# Patient Record
Sex: FEMALE | Race: WHITE | NOT HISPANIC OR LATINO | Employment: FULL TIME | URBAN - METROPOLITAN AREA
[De-identification: names, ages, dates, MRNs, and addresses within clinical notes are randomized per-mention and may not be internally consistent; named-entity substitution may affect disease eponyms.]

---

## 2020-02-05 ENCOUNTER — HOSPITAL ENCOUNTER (EMERGENCY)
Facility: HOSPITAL | Age: 30
Discharge: HOME | End: 2020-02-05
Attending: EMERGENCY MEDICINE | Admitting: EMERGENCY MEDICINE
Payer: COMMERCIAL

## 2020-02-05 VITALS
DIASTOLIC BLOOD PRESSURE: 68 MMHG | WEIGHT: 140 LBS | RESPIRATION RATE: 17 BRPM | SYSTOLIC BLOOD PRESSURE: 120 MMHG | HEART RATE: 86 BPM | OXYGEN SATURATION: 99 % | TEMPERATURE: 99 F

## 2020-02-05 DIAGNOSIS — R10.9 ABDOMINAL PAIN: Primary | ICD-10-CM

## 2020-02-05 DIAGNOSIS — R19.7 DIARRHEA: ICD-10-CM

## 2020-02-05 LAB
ALBUMIN SERPL BCP-MCNC: 3.7 G/DL (ref 3.5–5)
ALP SERPL-CCNC: 97 U/L (ref 46–116)
ALT SERPL W P-5'-P-CCNC: 20 U/L (ref 12–78)
ANION GAP SERPL CALCULATED.3IONS-SCNC: 9 MMOL/L (ref 4–13)
AST SERPL W P-5'-P-CCNC: 20 U/L (ref 5–45)
BASOPHILS # BLD AUTO: 0.02 THOUSANDS/ΜL (ref 0–0.1)
BASOPHILS NFR BLD AUTO: 1 % (ref 0–1)
BILIRUB SERPL-MCNC: 0.79 MG/DL (ref 0.2–1)
BUN SERPL-MCNC: 16 MG/DL (ref 5–25)
CALCIUM SERPL-MCNC: 8.8 MG/DL (ref 8.3–10.1)
CHLORIDE SERPL-SCNC: 107 MMOL/L (ref 100–108)
CO2 SERPL-SCNC: 25 MMOL/L (ref 21–32)
CREAT SERPL-MCNC: 0.83 MG/DL (ref 0.6–1.3)
EOSINOPHIL # BLD AUTO: 0.02 THOUSAND/ΜL (ref 0–0.61)
EOSINOPHIL NFR BLD AUTO: 1 % (ref 0–6)
ERYTHROCYTE [DISTWIDTH] IN BLOOD BY AUTOMATED COUNT: 13.3 % (ref 11.6–15.1)
GFR SERPL CREATININE-BSD FRML MDRD: 96 ML/MIN/1.73SQ M
GLUCOSE SERPL-MCNC: 88 MG/DL (ref 65–140)
HCT VFR BLD AUTO: 35 % (ref 34.8–46.1)
HGB BLD-MCNC: 11.6 G/DL (ref 11.5–15.4)
IMM GRANULOCYTES # BLD AUTO: 0.02 THOUSAND/UL (ref 0–0.2)
IMM GRANULOCYTES NFR BLD AUTO: 1 % (ref 0–2)
LIPASE SERPL-CCNC: 124 U/L (ref 73–393)
LYMPHOCYTES # BLD AUTO: 0.48 THOUSANDS/ΜL (ref 0.6–4.47)
LYMPHOCYTES NFR BLD AUTO: 12 % (ref 14–44)
MCH RBC QN AUTO: 28.8 PG (ref 26.8–34.3)
MCHC RBC AUTO-ENTMCNC: 33.1 G/DL (ref 31.4–37.4)
MCV RBC AUTO: 87 FL (ref 82–98)
MONOCYTES # BLD AUTO: 0.48 THOUSAND/ΜL (ref 0.17–1.22)
MONOCYTES NFR BLD AUTO: 12 % (ref 4–12)
NEUTROPHILS # BLD AUTO: 2.9 THOUSANDS/ΜL (ref 1.85–7.62)
NEUTS SEG NFR BLD AUTO: 73 % (ref 43–75)
NRBC BLD AUTO-RTO: 0 /100 WBCS
PLATELET # BLD AUTO: 171 THOUSANDS/UL (ref 149–390)
PMV BLD AUTO: 10.7 FL (ref 8.9–12.7)
POTASSIUM SERPL-SCNC: 4.3 MMOL/L (ref 3.5–5.3)
PROT SERPL-MCNC: 7.1 G/DL (ref 6.4–8.2)
RBC # BLD AUTO: 4.03 MILLION/UL (ref 3.81–5.12)
SODIUM SERPL-SCNC: 141 MMOL/L (ref 136–145)
WBC # BLD AUTO: 3.92 THOUSAND/UL (ref 4.31–10.16)

## 2020-02-05 PROCEDURE — 80053 COMPREHEN METABOLIC PANEL: CPT | Performed by: EMERGENCY MEDICINE

## 2020-02-05 PROCEDURE — 99284 EMERGENCY DEPT VISIT MOD MDM: CPT | Performed by: EMERGENCY MEDICINE

## 2020-02-05 PROCEDURE — 96374 THER/PROPH/DIAG INJ IV PUSH: CPT

## 2020-02-05 PROCEDURE — 36415 COLL VENOUS BLD VENIPUNCTURE: CPT | Performed by: EMERGENCY MEDICINE

## 2020-02-05 PROCEDURE — 99284 EMERGENCY DEPT VISIT MOD MDM: CPT

## 2020-02-05 PROCEDURE — 85025 COMPLETE CBC W/AUTO DIFF WBC: CPT | Performed by: EMERGENCY MEDICINE

## 2020-02-05 PROCEDURE — 83690 ASSAY OF LIPASE: CPT | Performed by: EMERGENCY MEDICINE

## 2020-02-05 PROCEDURE — 96361 HYDRATE IV INFUSION ADD-ON: CPT

## 2020-02-05 RX ORDER — ONDANSETRON 4 MG/1
4 TABLET, ORALLY DISINTEGRATING ORAL EVERY 8 HOURS PRN
Qty: 10 TABLET | Refills: 0 | Status: SHIPPED | OUTPATIENT
Start: 2020-02-05 | End: 2020-03-06 | Stop reason: ALTCHOICE

## 2020-02-05 RX ORDER — ONDANSETRON 2 MG/ML
4 INJECTION INTRAMUSCULAR; INTRAVENOUS ONCE
Status: COMPLETED | OUTPATIENT
Start: 2020-02-05 | End: 2020-02-05

## 2020-02-05 RX ADMIN — SODIUM CHLORIDE 1000 ML: 0.9 INJECTION, SOLUTION INTRAVENOUS at 11:50

## 2020-02-05 RX ADMIN — ONDANSETRON 4 MG: 2 INJECTION INTRAMUSCULAR; INTRAVENOUS at 11:50

## 2020-02-05 NOTE — ED NOTES
Pt ambulated to restroom by self with negative complications   Clean catch UA sample collected     Sparkle Lane RN  02/05/20 5950

## 2020-02-05 NOTE — ED PROVIDER NOTES
History  Chief Complaint   Patient presents with    Abdominal Pain     pt reports body aches, diarrhea and fevers; started  yesterday      34 y o  Female presents with chief complaint of right lower quadrant abdominal pain and crampy pain  Patient has had some diarrhea and nausea  No fevers  No unusual foods, recent travel or sick contacts  Patient's past surgical history is significant for  x 2  No urinary complaints  History provided by:  Patient   used: No    Abdominal Pain   Pain location:  RLQ  Pain quality: aching and sharp    Pain severity:  Mild  Onset quality:  Sudden  Duration:  1 day  Timing:  Constant  Progression:  Unchanged  Chronicity:  New  Context: not diet changes, not recent illness, not sick contacts, not suspicious food intake and not trauma    Relieved by:  Nothing  Worsened by:  Nothing  Ineffective treatments:  None tried  Associated symptoms: chills, diarrhea and nausea    Associated symptoms: no chest pain, no dysuria, no fever, no melena, no shortness of breath and no vomiting        None       History reviewed  No pertinent past medical history  Past Surgical History:   Procedure Laterality Date     SECTION         History reviewed  No pertinent family history  I have reviewed and agree with the history as documented  Social History     Tobacco Use    Smoking status: Never Smoker    Smokeless tobacco: Never Used   Substance Use Topics    Alcohol use: Never     Frequency: Never    Drug use: Never        Review of Systems   Constitutional: Positive for chills  Negative for diaphoresis and fever  Respiratory: Negative for shortness of breath  Cardiovascular: Negative for chest pain and palpitations  Gastrointestinal: Positive for abdominal pain, diarrhea and nausea  Negative for melena and vomiting  Genitourinary: Negative for dysuria and frequency  Skin: Negative for rash     All other systems reviewed and are negative  Physical Exam  Physical Exam   Constitutional: She is oriented to person, place, and time  She appears well-developed and well-nourished  She appears distressed  HENT:   Head: Normocephalic and atraumatic  Eyes: Pupils are equal, round, and reactive to light  EOM are normal    Neck: Normal range of motion  No JVD present  Cardiovascular: Normal rate, regular rhythm, normal heart sounds and intact distal pulses  Exam reveals no gallop and no friction rub  No murmur heard  Pulmonary/Chest: Effort normal and breath sounds normal  No respiratory distress  She has no wheezes  She has no rales  She exhibits no tenderness  Abdominal: Normal appearance and bowel sounds are normal  There is tenderness (mild) in the right lower quadrant and suprapubic area  There is no rigidity, no rebound, no guarding, no tenderness at McBurney's point and negative Huggins's sign  Musculoskeletal: Normal range of motion  She exhibits no tenderness  Neurological: She is alert and oriented to person, place, and time  Skin: Skin is warm and dry  Psychiatric: She has a normal mood and affect  Her behavior is normal  Judgment and thought content normal    Nursing note and vitals reviewed        Vital Signs  ED Triage Vitals   Temperature Pulse Respirations Blood Pressure SpO2   02/05/20 1103 02/05/20 1104 02/05/20 1104 02/05/20 1104 02/05/20 1104   99 °F (37 2 °C) 97 18 114/72 100 %      Temp Source Heart Rate Source Patient Position - Orthostatic VS BP Location FiO2 (%)   02/05/20 1103 02/05/20 1104 02/05/20 1104 02/05/20 1104 --   Oral Monitor Sitting Left arm       Pain Score       --                  Vitals:    02/05/20 1104   BP: 114/72   Pulse: 97   Patient Position - Orthostatic VS: Sitting         Visual Acuity      ED Medications  Medications   sodium chloride 0 9 % bolus 1,000 mL (1,000 mL Intravenous New Bag 2/5/20 1150)   ondansetron (ZOFRAN) injection 4 mg (4 mg Intravenous Given 2/5/20 1150) Diagnostic Studies  Results Reviewed     Procedure Component Value Units Date/Time    Comprehensive metabolic panel [[id number]] Collected:  02/05/20 1145    Lab Status:  Final result Specimen:  Blood from Arm, Right Updated:  02/05/20 1217     Sodium 141 mmol/L      Potassium 4 3 mmol/L      Chloride 107 mmol/L      CO2 25 mmol/L      ANION GAP 9 mmol/L      BUN 16 mg/dL      Creatinine 0 83 mg/dL      Glucose 88 mg/dL      Calcium 8 8 mg/dL      AST 20 U/L      ALT 20 U/L      Alkaline Phosphatase 97 U/L      Total Protein 7 1 g/dL      Albumin 3 7 g/dL      Total Bilirubin 0 79 mg/dL      eGFR 96 ml/min/1 73sq m     Narrative:       [employer] guidelines for Chronic Kidney Disease (CKD):     Stage 1 with normal or high GFR (GFR > 90 mL/min/1 73 square meters)    Stage 2 Mild CKD (GFR = 60-89 mL/min/1 73 square meters)    Stage 3A Moderate CKD (GFR = 45-59 mL/min/1 73 square meters)    Stage 3B Moderate CKD (GFR = 30-44 mL/min/1 73 square meters)    Stage 4 Severe CKD (GFR = 15-29 mL/min/1 73 square meters)    Stage 5 End Stage CKD (GFR <15 mL/min/1 73 square meters)  Note: GFR calculation is accurate only with a steady state creatinine    Lipase [[id number]]  (Normal) Collected:  02/05/20 1145    Lab Status:  Final result Specimen:  Blood from Arm, Right Updated:  02/05/20 1217     Lipase 124 u/L     CBC and differential [[id number]]  (Abnormal) Collected:  02/05/20 1145    Lab Status:  Final result Specimen:  Blood from Arm, Right Updated:  02/05/20 1201     WBC 3 92 Thousand/uL      RBC 4 03 Million/uL      Hemoglobin 11 6 g/dL      Hematocrit 35 0 %      MCV 87 fL      MCH 28 8 pg      MCHC 33 1 g/dL      RDW 13 3 %      MPV 10 7 fL      Platelets 270 Thousands/uL      nRBC 0 /100 WBCs      Neutrophils Relative 73 %      Immat GRANS % 1 %      Lymphocytes Relative 12 %      Monocytes Relative 12 %      Eosinophils Relative 1 %      Basophils Relative 1 %      Neutrophils Absolute 2 90 Thousands/µL      Immature Grans Absolute 0 02 Thousand/uL      Lymphocytes Absolute 0 48 Thousands/µL      Monocytes Absolute 0 48 Thousand/µL      Eosinophils Absolute 0 02 Thousand/µL      Basophils Absolute 0 02 Thousands/µL                  No orders to display              Procedures  Procedures         ED Course                               MDM  Number of Diagnoses or Management Options  Abdominal pain: new and requires workup  Diarrhea: new and requires workup  Diagnosis management comments: Background: 34 y o  female presents with nausea, diarrhea, mild abdominal pain and chills    Differential DX includes but is not limited to: gastroenteritis, colitis, enteritis, mesenteric adenitis, doubt appendicitis or ovarian pathology    Plan: cbc, metabolic panel, symptomatic treatment          Amount and/or Complexity of Data Reviewed  Clinical lab tests: ordered and reviewed    Risk of Complications, Morbidity, and/or Mortality  Presenting problems: high  Diagnostic procedures: high  Management options: high    Patient Progress  Patient progress: stable        Disposition  Final diagnoses:   Abdominal pain   Diarrhea     Time reflects when diagnosis was documented in both MDM as applicable and the Disposition within this note     Time User Action Codes Description Comment    2/5/2020 12:54 PM Marcha Gitelman B Add [R10 9] Abdominal pain     2/5/2020 12:54 PM Luis Suarez Add [R19 7] Diarrhea       ED Disposition     ED Disposition Condition Date/Time Comment    Discharge Stable Wed Feb 5, 2020 12:54 PM Angelina Joaquin discharge to home/self care              Follow-up Information    None         Patient's Medications   Discharge Prescriptions    ONDANSETRON (ZOFRAN-ODT) 4 MG DISINTEGRATING TABLET    Take 1 tablet (4 mg total) by mouth every 8 (eight) hours as needed for nausea or vomiting       Start Date: 2/5/2020  End Date: 3/6/2020       Order Dose: 4 mg       Quantity: 10 tablet    Refills: 0 No discharge procedures on file      ED Provider  Electronically Signed by           Pranav Galvez MD  02/05/20 [phone]

## 2020-12-04 ENCOUNTER — HOSPITAL ENCOUNTER (EMERGENCY)
Facility: HOSPITAL | Age: 30
Discharge: HOME | End: 2020-12-04
Payer: COMMERCIAL

## 2020-12-04 VITALS
OXYGEN SATURATION: 100 % | DIASTOLIC BLOOD PRESSURE: 55 MMHG | HEIGHT: 66 IN | BODY MASS INDEX: 24.36 KG/M2 | WEIGHT: 151.6 LBS | SYSTOLIC BLOOD PRESSURE: 121 MMHG | HEART RATE: 83 BPM | TEMPERATURE: 98.2 F | RESPIRATION RATE: 16 BRPM

## 2020-12-04 DIAGNOSIS — Z20.822 EXPOSURE TO COVID-19 VIRUS: Primary | ICD-10-CM

## 2020-12-04 PROCEDURE — 99283 EMERGENCY DEPT VISIT LOW MDM: CPT

## 2020-12-04 PROCEDURE — U0003 INFECTIOUS AGENT DETECTION BY NUCLEIC ACID (DNA OR RNA); SEVERE ACUTE RESPIRATORY SYNDROME CORONAVIRUS 2 (SARS-COV-2) (CORONAVIRUS DISEASE [COVID-19]), AMPLIFIED PROBE TECHNIQUE, MAKING USE OF HIGH THROUGHPUT TECHNOLOGIES AS DESCRIBED BY CMS-2020-01-R: HCPCS | Performed by: PHYSICIAN ASSISTANT

## 2020-12-04 PROCEDURE — 99282 EMERGENCY DEPT VISIT SF MDM: CPT | Performed by: PHYSICIAN ASSISTANT

## 2020-12-06 LAB — SARS-COV-2 RNA SPEC QL NAA+PROBE: NOT DETECTED

## 2021-03-10 ENCOUNTER — APPOINTMENT (EMERGENCY)
Dept: RADIOLOGY | Facility: HOSPITAL | Age: 31
End: 2021-03-10
Payer: COMMERCIAL

## 2021-03-10 ENCOUNTER — HOSPITAL ENCOUNTER (EMERGENCY)
Facility: HOSPITAL | Age: 31
Discharge: HOME | End: 2021-03-10
Attending: EMERGENCY MEDICINE | Admitting: EMERGENCY MEDICINE
Payer: COMMERCIAL

## 2021-03-10 VITALS
OXYGEN SATURATION: 97 % | HEART RATE: 85 BPM | DIASTOLIC BLOOD PRESSURE: 73 MMHG | TEMPERATURE: 98.4 F | SYSTOLIC BLOOD PRESSURE: 110 MMHG | RESPIRATION RATE: 16 BRPM | BODY MASS INDEX: 20.89 KG/M2 | WEIGHT: 130 LBS | HEIGHT: 66 IN

## 2021-03-10 DIAGNOSIS — R07.89 CHEST TIGHTNESS: Primary | ICD-10-CM

## 2021-03-10 DIAGNOSIS — R10.13 EPIGASTRIC DISCOMFORT: ICD-10-CM

## 2021-03-10 DIAGNOSIS — R19.7 DIARRHEA: ICD-10-CM

## 2021-03-10 LAB
ALBUMIN SERPL BCP-MCNC: 3.7 G/DL (ref 3.5–5)
ALP SERPL-CCNC: 88 U/L (ref 46–116)
ALT SERPL W P-5'-P-CCNC: 17 U/L (ref 12–78)
ANION GAP SERPL CALCULATED.3IONS-SCNC: 5 MMOL/L (ref 4–13)
AST SERPL W P-5'-P-CCNC: 16 U/L (ref 5–45)
ATRIAL RATE: 66 BPM
BASOPHILS # BLD AUTO: 0.06 THOUSANDS/ΜL (ref 0–0.1)
BASOPHILS NFR BLD AUTO: 1 % (ref 0–1)
BILIRUB SERPL-MCNC: 0.5 MG/DL (ref 0.2–1)
BILIRUB UR QL STRIP: NEGATIVE
BUN SERPL-MCNC: 12 MG/DL (ref 5–25)
CALCIUM SERPL-MCNC: 8.7 MG/DL (ref 8.3–10.1)
CHLORIDE SERPL-SCNC: 105 MMOL/L (ref 100–108)
CLARITY UR: CLEAR
CO2 SERPL-SCNC: 29 MMOL/L (ref 21–32)
COLOR UR: YELLOW
CREAT SERPL-MCNC: 0.86 MG/DL (ref 0.6–1.3)
EOSINOPHIL # BLD AUTO: 0.18 THOUSAND/ΜL (ref 0–0.61)
EOSINOPHIL NFR BLD AUTO: 3 % (ref 0–6)
ERYTHROCYTE [DISTWIDTH] IN BLOOD BY AUTOMATED COUNT: 13.7 % (ref 11.6–15.1)
EXT PREG TEST URINE: NEGATIVE
EXT. CONTROL ED NAV: NORMAL
FLUAV RNA RESP QL NAA+PROBE: NEGATIVE
FLUBV RNA RESP QL NAA+PROBE: NEGATIVE
GFR SERPL CREATININE-BSD FRML MDRD: 91 ML/MIN/1.73SQ M
GLUCOSE SERPL-MCNC: 91 MG/DL (ref 65–140)
GLUCOSE UR STRIP-MCNC: NEGATIVE MG/DL
HCT VFR BLD AUTO: 38.9 % (ref 34.8–46.1)
HGB BLD-MCNC: 12.2 G/DL (ref 11.5–15.4)
HGB UR QL STRIP.AUTO: NEGATIVE
IMM GRANULOCYTES # BLD AUTO: 0.01 THOUSAND/UL (ref 0–0.2)
IMM GRANULOCYTES NFR BLD AUTO: 0 % (ref 0–2)
KETONES UR STRIP-MCNC: NEGATIVE MG/DL
LEUKOCYTE ESTERASE UR QL STRIP: NEGATIVE
LYMPHOCYTES # BLD AUTO: 1.59 THOUSANDS/ΜL (ref 0.6–4.47)
LYMPHOCYTES NFR BLD AUTO: 30 % (ref 14–44)
MCH RBC QN AUTO: 27 PG (ref 26.8–34.3)
MCHC RBC AUTO-ENTMCNC: 31.4 G/DL (ref 31.4–37.4)
MCV RBC AUTO: 86 FL (ref 82–98)
MONOCYTES # BLD AUTO: 0.41 THOUSAND/ΜL (ref 0.17–1.22)
MONOCYTES NFR BLD AUTO: 8 % (ref 4–12)
NEUTROPHILS # BLD AUTO: 3.14 THOUSANDS/ΜL (ref 1.85–7.62)
NEUTS SEG NFR BLD AUTO: 58 % (ref 43–75)
NITRITE UR QL STRIP: NEGATIVE
NRBC BLD AUTO-RTO: 0 /100 WBCS
P AXIS: 19 DEGREES
PH UR STRIP.AUTO: 7 [PH] (ref 4.5–8)
PLATELET # BLD AUTO: 253 THOUSANDS/UL (ref 149–390)
PMV BLD AUTO: 10.6 FL (ref 8.9–12.7)
POTASSIUM SERPL-SCNC: 4.1 MMOL/L (ref 3.5–5.3)
PR INTERVAL: 158 MS
PROT SERPL-MCNC: 7.2 G/DL (ref 6.4–8.2)
PROT UR STRIP-MCNC: NEGATIVE MG/DL
QRS AXIS: 84 DEGREES
QRSD INTERVAL: 74 MS
QT INTERVAL: 380 MS
QTC INTERVAL: 392 MS
RBC # BLD AUTO: 4.52 MILLION/UL (ref 3.81–5.12)
RSV RNA RESP QL NAA+PROBE: NEGATIVE
SARS-COV-2 RNA RESP QL NAA+PROBE: NEGATIVE
SODIUM SERPL-SCNC: 139 MMOL/L (ref 136–145)
SP GR UR STRIP.AUTO: 1.01 (ref 1–1.03)
T WAVE AXIS: 52 DEGREES
TROPONIN I SERPL-MCNC: <0.02 NG/ML
UROBILINOGEN UR QL STRIP.AUTO: 0.2 E.U./DL
VENTRICULAR RATE: 64 BPM
WBC # BLD AUTO: 5.39 THOUSAND/UL (ref 4.31–10.16)

## 2021-03-10 PROCEDURE — 93010 ELECTROCARDIOGRAM REPORT: CPT | Performed by: INTERNAL MEDICINE

## 2021-03-10 PROCEDURE — 96374 THER/PROPH/DIAG INJ IV PUSH: CPT

## 2021-03-10 PROCEDURE — 0241U HB NFCT DS VIR RESP RNA 4 TRGT: CPT | Performed by: PHYSICIAN ASSISTANT

## 2021-03-10 PROCEDURE — 99285 EMERGENCY DEPT VISIT HI MDM: CPT | Performed by: PHYSICIAN ASSISTANT

## 2021-03-10 PROCEDURE — 96375 TX/PRO/DX INJ NEW DRUG ADDON: CPT

## 2021-03-10 PROCEDURE — 99285 EMERGENCY DEPT VISIT HI MDM: CPT

## 2021-03-10 PROCEDURE — 85025 COMPLETE CBC W/AUTO DIFF WBC: CPT | Performed by: PHYSICIAN ASSISTANT

## 2021-03-10 PROCEDURE — 80053 COMPREHEN METABOLIC PANEL: CPT | Performed by: PHYSICIAN ASSISTANT

## 2021-03-10 PROCEDURE — 36415 COLL VENOUS BLD VENIPUNCTURE: CPT | Performed by: PHYSICIAN ASSISTANT

## 2021-03-10 PROCEDURE — 71045 X-RAY EXAM CHEST 1 VIEW: CPT

## 2021-03-10 PROCEDURE — 93005 ELECTROCARDIOGRAM TRACING: CPT

## 2021-03-10 PROCEDURE — 84484 ASSAY OF TROPONIN QUANT: CPT | Performed by: PHYSICIAN ASSISTANT

## 2021-03-10 PROCEDURE — 81003 URINALYSIS AUTO W/O SCOPE: CPT

## 2021-03-10 PROCEDURE — 96361 HYDRATE IV INFUSION ADD-ON: CPT

## 2021-03-10 PROCEDURE — 81025 URINE PREGNANCY TEST: CPT | Performed by: PHYSICIAN ASSISTANT

## 2021-03-10 RX ORDER — ONDANSETRON 2 MG/ML
4 INJECTION INTRAMUSCULAR; INTRAVENOUS ONCE
Status: COMPLETED | OUTPATIENT
Start: 2021-03-10 | End: 2021-03-10

## 2021-03-10 RX ADMIN — FAMOTIDINE 20 MG: 10 INJECTION, SOLUTION INTRAVENOUS at 09:32

## 2021-03-10 RX ADMIN — SODIUM CHLORIDE 1000 ML: 0.9 INJECTION, SOLUTION INTRAVENOUS at 09:22

## 2021-03-10 RX ADMIN — ONDANSETRON 4 MG: 2 INJECTION INTRAMUSCULAR; INTRAVENOUS at 09:31

## 2021-03-10 NOTE — ED PROVIDER NOTES
History  Chief Complaint   Patient presents with    Chest Pain     3-4 day hx intermittant chest pain with SOB     Angelina A Cece Argueta is a 27 y o  female who presents to the ED with complaints of intermittent chest tightness and sensation of taking deep breath over the past few days  Patient reports that she is also having weakness" in her epigastric area of her stomach with associated nausea and diarrhea  Patient admits to a dry cough  Patient reports that her boyfriend recently tested positive for COVID-19 but she did have a recent test which was negative  Patient states prior to the onset of symptoms she did eat a pork chop which was coconut oil that was left out for a few days " Denies vomiting, blood in stool, sore throat, nasal congestion, leg pain, leg swelling, dyspnea on exertion, wheezing, hemoptysis, sore throat, abdominal pain, dysuria, urinary frequency, urinary urgency, hematuria, fever, chills  Patient denies previous DVT or PE  Patient denies chance of pregnancy  Patient does not take any exogenous estrogen  Patient did have a reconstructive buttocks surgery in December but no recent surgeries last 4 weeks  Patient does admit to smoking marijuana  History provided by:  Patient  Chest Pain  Pain location:  Epigastric  Pain quality: tightness    Duration:  4 days  Timing:  Intermittent  Progression:  Waxing and waning  Associated symptoms: cough and nausea    Associated symptoms: no abdominal pain, no anorexia, no anxiety, no diaphoresis, no dizziness, no dysphagia, no fatigue, no fever, no headache, no lower extremity edema, no orthopnea, no palpitations, no PND, no shortness of breath and not vomiting    Risk factors: no birth control, no diabetes mellitus, no high cholesterol, no hypertension, not pregnant and no prior DVT/PE        Prior to Admission Medications   Prescriptions Last Dose Informant Patient Reported?  Taking?   ondansetron (ZOFRAN-ODT) 4 mg disintegrating tablet   No No Sig: Take 1 tablet (4 mg total) by mouth every 8 (eight) hours as needed for nausea or vomiting      Facility-Administered Medications: None       History reviewed  No pertinent past medical history  Past Surgical History:   Procedure Laterality Date    ABDOMINAL SURGERY       SECTION      COMBINED ABDOMINOPLASTY AND LIPOSUCTION      COSMETIC SURGERY      Pt states " Olinda"       Family History   Problem Relation Age of Onset    No Known Problems Mother     No Known Problems Father      I have reviewed and agree with the history as documented  E-Cigarette/Vaping     E-Cigarette/Vaping Substances     Social History     Tobacco Use    Smoking status: Never Smoker    Smokeless tobacco: Never Used    Tobacco comment: Former smoker - As per [place]    Substance Use Topics    Alcohol use: Never     Frequency: Never    Drug use: Never       Review of Systems   Constitutional: Positive for appetite change  Negative for chills, diaphoresis, fatigue, fever and unexpected weight change  HENT: Negative for congestion, drooling, ear pain, rhinorrhea, sore throat, trouble swallowing and voice change  Eyes: Negative for pain, discharge, redness and visual disturbance  Respiratory: Positive for cough  Negative for shortness of breath, wheezing and stridor  Cardiovascular: Positive for chest pain  Negative for palpitations, orthopnea, leg swelling and PND  Gastrointestinal: Positive for diarrhea and nausea  Negative for abdominal pain, anorexia, blood in stool, constipation and vomiting  Genitourinary: Negative for dysuria, flank pain, frequency, hematuria and urgency  Musculoskeletal: Negative for gait problem, joint swelling, neck pain and neck stiffness  Skin: Negative for color change and rash  Neurological: Negative for dizziness, seizures, light-headedness and headaches  Physical Exam  Physical Exam  Vitals signs and nursing note reviewed     Constitutional: General: She is not in acute distress  Appearance: She is well-developed  She is not ill-appearing  HENT:      Head: Normocephalic and atraumatic  Nose: Nose normal    Eyes:      Conjunctiva/sclera: Conjunctivae normal       Pupils: Pupils are equal, round, and reactive to light  Cardiovascular:      Rate and Rhythm: Normal rate and regular rhythm  Pulmonary:      Effort: Pulmonary effort is normal       Breath sounds: Normal breath sounds  No decreased breath sounds or wheezing  Abdominal:      General: Abdomen is flat  Bowel sounds are normal       Palpations: Abdomen is soft  Tenderness: There is no abdominal tenderness  There is no right CVA tenderness, left CVA tenderness, guarding or rebound  Musculoskeletal: Normal range of motion  Right lower leg: She exhibits no tenderness  No edema  Left lower leg: She exhibits no tenderness  No edema  Skin:     General: Skin is warm and dry  Capillary Refill: Capillary refill takes less than 2 seconds  Neurological:      Mental Status: She is alert and oriented to person, place, and time           Vital Signs  ED Triage Vitals [03/10/21 0850]   Temperature Pulse Respirations Blood Pressure SpO2   98 4 °F (36 9 °C) 85 16 110/73 97 %      Temp Source Heart Rate Source Patient Position - Orthostatic VS BP Location FiO2 (%)   Oral Monitor Lying Right arm --      Pain Score       No Pain           Vitals:    03/10/21 0850   BP: 110/73   Pulse: 85   Patient Position - Orthostatic VS: Lying         Visual Acuity      ED Medications  Medications   ondansetron (ZOFRAN) injection 4 mg (4 mg Intravenous Given 3/10/21 0931)   famotidine (PEPCID) injection 20 mg (20 mg Intravenous Given 3/10/21 0932)   sodium chloride 0 9 % bolus 1,000 mL (0 mL Intravenous Stopped 3/10/21 1105)       Diagnostic Studies  Results Reviewed     Procedure Component Value Units Date/Time    POCT pregnancy, urine [[id number]]  (Normal) Resulted: 03/10/21 1056    Lab Status: Final result Updated: 03/10/21 1057     EXT PREG TEST UR (Ref: Negative) negative     Control valid    Urine Macroscopic, POC [[id number]] Collected: 03/10/21 1053    Lab Status: Final result Specimen: Urine Updated: 03/10/21 1054     Color, UA Yellow     Clarity, UA Clear     pH, UA 7 0     Leukocytes, UA Negative     Nitrite, UA Negative     Protein, UA Negative mg/dl      Glucose, UA Negative mg/dl      Ketones, UA Negative mg/dl      Urobilinogen, UA 0 2 E U /dl      Bilirubin, UA Negative     Blood, UA Negative     Specific Gravity, UA 1 015    Narrative:      [facility] RESULT    COVID19, Influenza A/B, RSV PCR, [facility] [[id number]]  (Normal) Collected: 03/10/21 0922    Lab Status: Final result Specimen: Nares from Nasopharyngeal Swab Updated: 03/10/21 1034     SARS-CoV-2 Negative     INFLUENZA A PCR Negative     INFLUENZA B PCR Negative     RSV PCR Negative    Narrative: This test has been authorized by FDA under an EUA (Emergency Use Assay) for use by authorized laboratories  Clinical caution and judgement should be used with the interpretation of these results with consideration of the clinical impression and other laboratory testing  Testing reported as "Positive" or "Negative" has been proven to be accurate according to standard laboratory validation requirements  All testing is performed with control materials showing appropriate reactivity at standard intervals      Troponin I [[id number]]  (Normal) Collected: 03/10/21 0922    Lab Status: Final result Specimen: Blood from Arm, Right Updated: 03/10/21 0947     Troponin I <0 02 ng/mL     Comprehensive metabolic panel [[id number]] Collected: 03/10/21 0923    Lab Status: Final result Specimen: Blood from Arm, Right Updated: 03/10/21 0945     Sodium 139 mmol/L      Potassium 4 1 mmol/L      Chloride 105 mmol/L      CO2 29 mmol/L      ANION GAP 5 mmol/L      BUN 12 mg/dL      Creatinine 0 86 mg/dL      Glucose 91 mg/dL      Calcium 8 7 mg/dL      AST 16 U/L      ALT 17 U/L      Alkaline Phosphatase 88 U/L      Total Protein 7 2 g/dL      Albumin 3 7 g/dL      Total Bilirubin 0 50 mg/dL      eGFR 91 ml/min/1 73sq m     Narrative:      [place] guidelines for Chronic Kidney Disease (CKD):     Stage 1 with normal or high GFR (GFR > 90 mL/min/1 73 square meters)    Stage 2 Mild CKD (GFR = 60-89 mL/min/1 73 square meters)    Stage 3A Moderate CKD (GFR = 45-59 mL/min/1 73 square meters)    Stage 3B Moderate CKD (GFR = 30-44 mL/min/1 73 square meters)    Stage 4 Severe CKD (GFR = 15-29 mL/min/1 73 square meters)    Stage 5 End Stage CKD (GFR <15 mL/min/1 73 square meters)  Note: GFR calculation is accurate only with a steady state creatinine    CBC and differential [[id number]] Collected: 03/10/21 0922    Lab Status: Final result Specimen: Blood from Arm, Right Updated: 03/10/21 0931     WBC 5 39 Thousand/uL      RBC 4 52 Million/uL      Hemoglobin 12 2 g/dL      Hematocrit 38 9 %      MCV 86 fL      MCH 27 0 pg      MCHC 31 4 g/dL      RDW 13 7 %      MPV 10 6 fL      Platelets 663 Thousands/uL      nRBC 0 /100 WBCs      Neutrophils Relative 58 %      Immat GRANS % 0 %      Lymphocytes Relative 30 %      Monocytes Relative 8 %      Eosinophils Relative 3 %      Basophils Relative 1 %      Neutrophils Absolute 3 14 Thousands/µL      Immature Grans Absolute 0 01 Thousand/uL      Lymphocytes Absolute 1 59 Thousands/µL      Monocytes Absolute 0 41 Thousand/µL      Eosinophils Absolute 0 18 Thousand/µL      Basophils Absolute 0 06 Thousands/µL                  XR chest 1 view   ED Interpretation by Latonya Conteh PA-C (03/10 1009)   No acute cardiopulmonary disease      Final Result by Jesu Scherer MD (03/10 1133)      No acute cardiopulmonary disease                    Workstation performed: [id number]                    Procedures  ECG 12 Lead Documentation Only    Date/Time: 3/10/2021 10:30 AM  Performed by: Latonya Conteh SUMA  Authorized by: Christiane Truong MD     Indications / Diagnosis:  Chest Tightness  ECG reviewed by me, the ED Provider: yes    Patient location:  ED  Previous ECG:     Previous ECG:  Unavailable  Rate:     ECG rate:  64  Rhythm:     Rhythm: sinus rhythm    QRS:     QRS axis:  Normal  ST segments:     ST segments:  Normal  T waves:     T waves: inverted      Inverted:  AVL  Comments:      No acute ST or T wave changes  QT/QTc 380/392             ED Course  ED Course as of Mar 10 1203   Wed Mar 10, 2021   1141 Patient is feeling improved  5 Educated patient regarding diagnosis and management  Advised patient to follow up with PCP  Advised patient to RTER for persistent or worsening symptoms  HEART Risk Score      Most Recent Value   Heart Score Risk Calculator   History  0 Filed at: 03/10/2021 1010   ECG  0 Filed at: 03/10/2021 1010   Age  0 Filed at: 03/10/2021 1010   Risk Factors  0 Filed at: 03/10/2021 1010   Troponin  0 Filed at: 03/10/2021 1010   HEART Score  0 Filed at: 03/10/2021 1010              PERC Rule for PE      Most Recent Value   PERC Rule for PE   Age >=50  0 Filed at: 03/10/2021 1010   HR >=100  0 Filed at: 03/10/2021 1010   O2 Sat on room air < 95%  0 Filed at: 03/10/2021 1010   History of PE or DVT  0 Filed at: 03/10/2021 1010   Recent trauma or surgery  0 Filed at: 03/10/2021 1010   Hemoptysis  0 Filed at: 03/10/2021 1010   Exogenous estrogen  0 Filed at: 03/10/2021 1010   Unilateral leg swelling  0 Filed at: 03/10/2021 1010   PERC Rule for PE Results  0 Filed at: 03/10/2021 1010                            MDM  Number of Diagnoses or Management Options  Chest tightness: new and requires workup  Diarrhea: new and requires workup  Epigastric discomfort: new and requires workup  Diagnosis management comments: EKG unremarkable  Urine pregnancy negative  Labs unremarkable  Patient is feeling better after Pepcid and Zofran  I provided patient with strict RTER precautions  I advised patient follow-up with PCP in 24-48 hours  Patient verbalized understanding  Amount and/or Complexity of Data Reviewed  Clinical lab tests: ordered and reviewed  Tests in the radiology section of CPT®: reviewed and ordered  Review and summarize past medical records: yes    Patient Progress  Patient progress: stable      Disposition  Final diagnoses:   Chest tightness   Epigastric discomfort   Diarrhea     Time reflects when diagnosis was documented in both MDM as applicable and the Disposition within this note     Time User Action Codes Description Comment    3/10/2021 11:04 AM Ora Main Add [R07 89] Chest tightness     3/10/2021 11:04 AM Ora Main Add [R10 13] Epigastric discomfort     3/10/2021 11:04 AM Ora Main Add [R19 7] Diarrhea       ED Disposition     ED Disposition Condition Date/Time Comment    Discharge Stable Wed Mar 10, 2021 11:41 AM Angelina Dupont discharge to home/self care  Follow-up Information     Follow up With Specialties Details Why Contact Info Additional Information    [facility] Emergency Medicine Go to  If symptoms worsen [address] Emergency Department, [place], [place], [address]    Seferino Marcus MD Family Medicine Schedule an appointment as soon as possible for a visit   [address]  [id number] [phone]             Patient's Medications   Discharge Prescriptions    No medications on file     No discharge procedures on file      PDMP Review     None          ED Provider  Electronically Signed by           Michael Tijerina PA-C  03/10/21 [id number]

## 2021-03-10 NOTE — Clinical Note
Jamey Yari was seen and treated in our emergency department on 3/10/2021  Diagnosis:     Angelina  may return to work on return date  She may return on this date: 03/13/2021         If you have any questions or concerns, please don't hesitate to call        Claudetta Daunt, PA-C    ______________________________           _______________          _______________  Hospital Representative                              Date                                Time

## 2021-03-10 NOTE — DISCHARGE INSTRUCTIONS
Chest Pain   WHAT YOU NEED TO KNOW:   Chest pain can be caused by a range of conditions, from not serious to life-threatening  Chest pain can be a symptom of a digestive problem, such as acid reflux or a stomach ulcer  An anxiety attack or a strong emotion, such as anger, can also cause chest pain  Infection, inflammation, or a fracture in the bones or cartilage in your chest can cause pain or discomfort  Sometimes chest pain or pressure is caused by poor blood flow to your heart (angina)  Chest pain may also be caused by life-threatening conditions such as a heart attack or blood clot in your lungs  DISCHARGE INSTRUCTIONS:   Call 911 if:   · You have any of the following signs of a heart attack:      ? Squeezing, pressure, or pain in your chest    ? You may  also have any of the following:     § Discomfort or pain in your back, neck, jaw, stomach, or arm    § Shortness of breath    § Nausea or vomiting    § Lightheadedness or a sudden cold sweat      Return to the emergency department if:   · You have chest discomfort that gets worse, even with medicine  · You cough or vomit blood  · Your bowel movements are black or bloody  · You cannot stop vomiting, or it hurts to swallow  Contact your healthcare provider if:   · You have questions or concerns about your condition or care  Medicines:   · Medicines  may be given to treat the cause of your chest pain  Examples include pain medicine, anxiety medicine, or medicines to increase blood flow to your heart  · Do not take certain medicines without asking your healthcare provider first   These include NSAIDs, herbal or vitamin supplements, or hormones (estrogen or progestin)  · Take your medicine as directed  Contact your healthcare provider if you think your medicine is not helping or if you have side effects  Tell him or her if you are allergic to any medicine  Keep a list of the medicines, vitamins, and herbs you take   Include the amounts, and when and why you take them  Bring the list or the pill bottles to follow-up visits  Carry your medicine list with you in case of an emergency  Follow up with your healthcare provider within 72 hours, or as directed: You may need to return for more tests to find the cause of your chest pain  You may be referred to a specialist, such as a cardiologist or gastroenterologist  Write down your questions so you remember to ask them during your visits  Healthy living tips: The following are general healthy guidelines  If your chest pain is caused by a heart problem, your healthcare provider will give you specific guidelines to follow  · Do not smoke  Nicotine and other chemicals in cigarettes and cigars can cause lung and heart damage  Ask your healthcare provider for information if you currently smoke and need help to quit  E-cigarettes or smokeless tobacco still contain nicotine  Talk to your healthcare provider before you use these products  · Eat a variety of healthy, low-fat, low-salt foods  Healthy foods include fruits, vegetables, whole-grain breads, low-fat dairy products, beans, lean meats, and fish  Ask for more information about a heart healthy diet  · Drink plenty of water every day  Your body is made of mostly water  Water helps your body to control your temperature and blood pressure  Ask your healthcare provider how much water you should drink every day  · Ask about activity  Your healthcare provider will tell you which activities to limit or avoid  Ask when you can drive, return to work, and have sex  Ask about the best exercise plan for you  · Maintain a healthy weight  Ask your healthcare provider how much you should weigh  Ask him or her to help you create a weight loss plan if you are overweight  · Get the flu and pneumonia vaccines  All adults should get the influenza (flu) vaccine  Get it every year as soon as it becomes available   The pneumococcal vaccine is given to adults aged 72 years or older  The vaccine is given every 5 years to prevent pneumococcal disease, such as pneumonia  If you have a stent:   · Carry your stent card with you at all times  · Let all healthcare providers know that you have a stent  © Copyright [address] Information is for End User's use only and may not be sold, redistributed or otherwise used for commercial purposes  All illustrations and images included in CareNotes® are the copyrighted property of A D A M , Inc  or [facility] Willard Wright   The above information is an  only  It is not intended as medical advice for individual conditions or treatments  Talk to your doctor, nurse or pharmacist before following any medical regimen to see if it is safe and effective for you  Epigastric Pain   WHAT YOU NEED TO KNOW:   Epigastric pain is felt in the middle of the upper abdomen, between the ribs and the bellybutton  The pain may be mild or severe  Pain may spread from or to another part of your body  Epigastric pain may be a sign of a serious health problem that needs to be treated  DISCHARGE INSTRUCTIONS:   Call 911 for any of the following:   · You have any of the following signs of a heart attack:      ? Squeezing, pressure, or pain in your chest    ? You may  also have any of the following:     § Discomfort or pain in your back, neck, jaw, stomach, or arm    § Shortness of breath    § Nausea or vomiting    § Lightheadedness or a sudden cold sweat    · You have severe pain that radiates to your jaw or back  Return to the emergency department if:   · You have severe pain that starts suddenly and quickly gets worse  · You cannot have a bowel movement and are vomiting  · You vomit or cough up blood  · You see blood in your urine or bowel movement  · You feel drowsy and your breathing is slower than usual     Contact your healthcare provider if:   · You have a fever or chills      · You have yellowing of your skin or the whites of your eyes  · You vomit often or several times in a row  · You lose weight without trying  · You have symptoms for longer than 2 weeks  · You have questions or concerns about your condition or care  Medicines:   · Medicines  may be given to treat pain or stop vomiting  You may also need medicines to reduce or control stomach acid, or treat an infection  · Take your medicine as directed  Contact your healthcare provider if you think your medicine is not helping or if you have side effects  Tell him of her if you are allergic to any medicine  Keep a list of the medicines, vitamins, and herbs you take  Include the amounts, and when and why you take them  Bring the list or the pill bottles to follow-up visits  Carry your medicine list with you in case of an emergency  Follow up with your healthcare provider as directed:  Write down your questions so you remember to ask them during your visits  Manage your symptoms:   · Keep a record of your symptoms  Include when the pain starts, how long it lasts, and if it is sharp or dull  Also include any foods you ate or activities you did before the pain started  Keep track of anything that helped the pain  · Eat a variety of healthy foods  Healthy foods include fruits, vegetables, whole-grain breads, low-fat dairy products, beans, lean meats, and fish  Ask if you need to be on a special diet  Certain foods may cause your pain, such as alcohol or foods that are high in fat  You may need to eat smaller meals and to eat more often than usual     · Drink liquids as directed  Ask how much liquid to drink each day and which liquids are best for you  Do not have drinks that contain alcohol or caffeine  © Copyright [address] Information is for End User's use only and may not be sold, redistributed or otherwise used for commercial purposes   All illustrations and images included in CareNotes® are the copyrighted property of A D A M , Inc  or [facility] Willard Wright   The above information is an  only  It is not intended as medical advice for individual conditions or treatments  Talk to your doctor, nurse or pharmacist before following any medical regimen to see if it is safe and effective for you

## 2021-03-10 NOTE — ED NOTES
Patient tolerated PO challenge well  No complaints of pain or discomfort at this time       Rachelle Pickett RN  03/10/21 7736

## 2021-09-21 ENCOUNTER — HOSPITAL ENCOUNTER (EMERGENCY)
Facility: HOSPITAL | Age: 31
Discharge: HOME | End: 2021-09-21
Attending: EMERGENCY MEDICINE
Payer: COMMERCIAL

## 2021-09-21 VITALS
TEMPERATURE: 98.4 F | WEIGHT: 147.27 LBS | SYSTOLIC BLOOD PRESSURE: 104 MMHG | HEIGHT: 66 IN | RESPIRATION RATE: 18 BRPM | BODY MASS INDEX: 23.67 KG/M2 | OXYGEN SATURATION: 100 % | DIASTOLIC BLOOD PRESSURE: 57 MMHG | HEART RATE: 55 BPM

## 2021-09-21 DIAGNOSIS — K52.9 ENTERITIS: Primary | ICD-10-CM

## 2021-09-21 DIAGNOSIS — N39.0 UTI (URINARY TRACT INFECTION): ICD-10-CM

## 2021-09-21 LAB
ALBUMIN SERPL BCP-MCNC: 4 G/DL (ref 3.5–5)
ALP SERPL-CCNC: 77 U/L (ref 46–116)
ALT SERPL W P-5'-P-CCNC: 16 U/L (ref 12–78)
ANION GAP SERPL CALCULATED.3IONS-SCNC: 6 MMOL/L (ref 4–13)
AST SERPL W P-5'-P-CCNC: 12 U/L (ref 5–45)
BACTERIA UR QL AUTO: ABNORMAL /HPF
BASOPHILS # BLD AUTO: 0.07 THOUSANDS/ΜL (ref 0–0.1)
BASOPHILS NFR BLD AUTO: 1 % (ref 0–1)
BILIRUB SERPL-MCNC: 0.81 MG/DL (ref 0.2–1)
BILIRUB UR QL STRIP: NEGATIVE
BUN SERPL-MCNC: 15 MG/DL (ref 5–25)
CALCIUM SERPL-MCNC: 8.8 MG/DL (ref 8.3–10.1)
CHLORIDE SERPL-SCNC: 106 MMOL/L (ref 100–108)
CLARITY UR: CLEAR
CO2 SERPL-SCNC: 30 MMOL/L (ref 21–32)
COLOR UR: YELLOW
CREAT SERPL-MCNC: 0.91 MG/DL (ref 0.6–1.3)
EOSINOPHIL # BLD AUTO: 0.1 THOUSAND/ΜL (ref 0–0.61)
EOSINOPHIL NFR BLD AUTO: 1 % (ref 0–6)
ERYTHROCYTE [DISTWIDTH] IN BLOOD BY AUTOMATED COUNT: 13.2 % (ref 11.6–15.1)
EXT PREG TEST URINE: NEGATIVE
EXT. CONTROL ED NAV: NORMAL
GFR SERPL CREATININE-BSD FRML MDRD: 84 ML/MIN/1.73SQ M
GLUCOSE SERPL-MCNC: 95 MG/DL (ref 65–140)
GLUCOSE UR STRIP-MCNC: NEGATIVE MG/DL
HCT VFR BLD AUTO: 38.7 % (ref 34.8–46.1)
HGB BLD-MCNC: 12.8 G/DL (ref 11.5–15.4)
HGB UR QL STRIP.AUTO: NEGATIVE
IMM GRANULOCYTES # BLD AUTO: 0.02 THOUSAND/UL (ref 0–0.2)
IMM GRANULOCYTES NFR BLD AUTO: 0 % (ref 0–2)
KETONES UR STRIP-MCNC: ABNORMAL MG/DL
LEUKOCYTE ESTERASE UR QL STRIP: NEGATIVE
LIPASE SERPL-CCNC: 81 U/L (ref 73–393)
LYMPHOCYTES # BLD AUTO: 2.1 THOUSANDS/ΜL (ref 0.6–4.47)
LYMPHOCYTES NFR BLD AUTO: 28 % (ref 14–44)
MCH RBC QN AUTO: 29.2 PG (ref 26.8–34.3)
MCHC RBC AUTO-ENTMCNC: 33.1 G/DL (ref 31.4–37.4)
MCV RBC AUTO: 88 FL (ref 82–98)
MONOCYTES # BLD AUTO: 0.67 THOUSAND/ΜL (ref 0.17–1.22)
MONOCYTES NFR BLD AUTO: 9 % (ref 4–12)
NEUTROPHILS # BLD AUTO: 4.61 THOUSANDS/ΜL (ref 1.85–7.62)
NEUTS SEG NFR BLD AUTO: 61 % (ref 43–75)
NITRITE UR QL STRIP: POSITIVE
NON-SQ EPI CELLS URNS QL MICRO: ABNORMAL /HPF
NRBC BLD AUTO-RTO: 0 /100 WBCS
PH UR STRIP.AUTO: 6.5 [PH] (ref 4.5–8)
PLATELET # BLD AUTO: 231 THOUSANDS/UL (ref 149–390)
PMV BLD AUTO: 11 FL (ref 8.9–12.7)
POTASSIUM SERPL-SCNC: 4.8 MMOL/L (ref 3.5–5.3)
PROT SERPL-MCNC: 6.9 G/DL (ref 6.4–8.2)
PROT UR STRIP-MCNC: NEGATIVE MG/DL
RBC # BLD AUTO: 4.39 MILLION/UL (ref 3.81–5.12)
RBC #/AREA URNS AUTO: ABNORMAL /HPF
SODIUM SERPL-SCNC: 142 MMOL/L (ref 136–145)
SP GR UR STRIP.AUTO: 1.01 (ref 1–1.03)
UROBILINOGEN UR QL STRIP.AUTO: 0.2 E.U./DL
WBC # BLD AUTO: 7.57 THOUSAND/UL (ref 4.31–10.16)
WBC #/AREA URNS AUTO: ABNORMAL /HPF

## 2021-09-21 PROCEDURE — 83690 ASSAY OF LIPASE: CPT | Performed by: EMERGENCY MEDICINE

## 2021-09-21 PROCEDURE — 80053 COMPREHEN METABOLIC PANEL: CPT | Performed by: EMERGENCY MEDICINE

## 2021-09-21 PROCEDURE — 96374 THER/PROPH/DIAG INJ IV PUSH: CPT

## 2021-09-21 PROCEDURE — 81001 URINALYSIS AUTO W/SCOPE: CPT

## 2021-09-21 PROCEDURE — 85025 COMPLETE CBC W/AUTO DIFF WBC: CPT | Performed by: EMERGENCY MEDICINE

## 2021-09-21 PROCEDURE — 81025 URINE PREGNANCY TEST: CPT | Performed by: EMERGENCY MEDICINE

## 2021-09-21 PROCEDURE — 99284 EMERGENCY DEPT VISIT MOD MDM: CPT

## 2021-09-21 PROCEDURE — 36415 COLL VENOUS BLD VENIPUNCTURE: CPT | Performed by: EMERGENCY MEDICINE

## 2021-09-21 PROCEDURE — 99284 EMERGENCY DEPT VISIT MOD MDM: CPT | Performed by: EMERGENCY MEDICINE

## 2021-09-21 PROCEDURE — 96360 HYDRATION IV INFUSION INIT: CPT

## 2021-09-21 RX ORDER — ONDANSETRON 2 MG/ML
4 INJECTION INTRAMUSCULAR; INTRAVENOUS ONCE
Status: COMPLETED | OUTPATIENT
Start: 2021-09-21 | End: 2021-09-21

## 2021-09-21 RX ORDER — NITROFURANTOIN 25; 75 MG/1; MG/1
100 CAPSULE ORAL 2 TIMES DAILY
Qty: 10 CAPSULE | Refills: 0 | Status: SHIPPED | OUTPATIENT
Start: 2021-09-21 | End: 2021-11-03

## 2021-09-21 RX ORDER — DICYCLOMINE HCL 20 MG
20 TABLET ORAL ONCE
Status: COMPLETED | OUTPATIENT
Start: 2021-09-21 | End: 2021-09-21

## 2021-09-21 RX ORDER — ONDANSETRON 4 MG/1
4 TABLET, ORALLY DISINTEGRATING ORAL EVERY 6 HOURS PRN
Qty: 15 TABLET | Refills: 0 | Status: SHIPPED | OUTPATIENT
Start: 2021-09-21 | End: 2021-11-03

## 2021-09-21 RX ORDER — DICYCLOMINE HCL 20 MG
20 TABLET ORAL 4 TIMES DAILY PRN
Qty: 20 TABLET | Refills: 0 | Status: SHIPPED | OUTPATIENT
Start: 2021-09-21 | End: 2021-11-03

## 2021-09-21 RX ADMIN — ONDANSETRON 4 MG: 2 INJECTION INTRAMUSCULAR; INTRAVENOUS at 09:57

## 2021-09-21 RX ADMIN — DICYCLOMINE HYDROCHLORIDE 20 MG: 20 TABLET ORAL at 09:57

## 2021-09-21 RX ADMIN — SODIUM CHLORIDE 1000 ML: 0.9 INJECTION, SOLUTION INTRAVENOUS at 09:55

## 2021-09-21 NOTE — Clinical Note
Albaro Miller was seen and treated in our emergency department on 9/21/2021  Diagnosis:     Angelina  may return to work on return date  She may return on this date: 09/22/2021         If you have any questions or concerns, please don't hesitate to call        Fawn Perez RN    ______________________________           _______________          _______________  Hospital Representative                              Date                                Time

## 2021-09-21 NOTE — ED PROVIDER NOTES
History  Chief Complaint   Patient presents with    Abdominal Pain     Pt thinks she has food poisioning but symptoms started over a week ago  +abdominal pain, diarhea, nausea, sweats  Pt reports when she takes a deep breath sometimes she has pressure in her chest       57-year-old female presents emergency department for evaluation of nausea, abdominal cramping, and diarrhea  Patient states that her symptoms started last  9 days ago  She states that on Saturday 10 days ago she had a few alcoholic drinks but did not drink in excess  On  she had nausea vomiting and diarrhea  Her vomiting has subsided but she continues to have nausea and diarrhea  She states the stool is watery and green in color, no blood noted  She denies fevers or chills  Patient states that the cramping precedes the diarrhea and is slightly improved with bowel movements  She reports decreased appetite  No dysuria or hematuria  No abnormal vaginal discharge  Last menstrual cycle was 3 weeks ago  Patient denies recent travel or sick contacts  She has a history of COVID-19 infection in May of this year  She has not been immunized  Patient has not taken any medications at home for symptoms        History provided by:  Patient and medical records   used: No    Abdominal Pain  Pain location:  Generalized  Pain quality: cramping    Pain radiates to:  Does not radiate  Pain severity:  Moderate  Onset quality:  Gradual  Duration:  9 days  Timing:  Intermittent  Progression:  Waxing and waning  Chronicity:  New  Context: alcohol use and previous surgery ( section x2)    Context: not diet changes, not recent illness, not sick contacts, not suspicious food intake and not trauma    Relieved by:  Nothing  Worsened by:  Nothing  Ineffective treatments:  None tried  Associated symptoms: anorexia, diarrhea and nausea    Associated symptoms: no chills, no constipation, no cough, no fever, no hematemesis, no hematochezia, no vaginal bleeding and no vaginal discharge    Risk factors: not pregnant and no recent hospitalization        Prior to Admission Medications   Prescriptions Last Dose Informant Patient Reported? Taking?   ondansetron (ZOFRAN-ODT) 4 mg disintegrating tablet   No No   Sig: Take 1 tablet (4 mg total) by mouth every 8 (eight) hours as needed for nausea or vomiting      Facility-Administered Medications: None       History reviewed  No pertinent past medical history  Past Surgical History:   Procedure Laterality Date    ABDOMINAL SURGERY       SECTION      COMBINED ABDOMINOPLASTY AND LIPOSUCTION      COSMETIC SURGERY      Pt states " [facility] [place]"       Family History   Problem Relation Age of Onset    No Known Problems Mother     No Known Problems Father      I have reviewed and agree with the history as documented  E-Cigarette/Vaping    E-Cigarette Use Never User      E-Cigarette/Vaping Substances     Social History     Tobacco Use    Smoking status: Never Smoker    Smokeless tobacco: Never Used    Tobacco comment: Former smoker - As per [employer] Vaping Use: Never used   Substance Use Topics    Alcohol use: Never    Drug use: Yes     Types: Marijuana       Review of Systems   Constitutional: Positive for appetite change  Negative for chills and fever  Respiratory: Negative for cough  Gastrointestinal: Positive for abdominal pain, anorexia, diarrhea and nausea  Negative for constipation, hematemesis and hematochezia  Genitourinary: Negative for vaginal bleeding and vaginal discharge  Musculoskeletal: Negative for back pain  Neurological: Negative for weakness  All other systems reviewed and are negative  Physical Exam  Physical Exam  Vitals and nursing note reviewed  Constitutional:       General: She is not in acute distress  Appearance: She is well-developed  She is not ill-appearing, toxic-appearing or diaphoretic     HENT: Head: Normocephalic  Nose: Nose normal       Mouth/Throat:      Pharynx: No oropharyngeal exudate  Eyes:      General: No scleral icterus  Conjunctiva/sclera: Conjunctivae normal       Pupils: Pupils are equal, round, and reactive to light  Cardiovascular:      Rate and Rhythm: Normal rate and regular rhythm  Heart sounds: Normal heart sounds  Pulmonary:      Effort: Pulmonary effort is normal       Breath sounds: Normal breath sounds  Abdominal:      General: Bowel sounds are normal  There is no distension  Palpations: Abdomen is soft  Tenderness: There is generalized abdominal tenderness  There is no guarding or rebound  Hernia: No hernia is present  Musculoskeletal:         General: No tenderness or deformity  Normal range of motion  Cervical back: Normal range of motion and neck supple  Lymphadenopathy:      Cervical: No cervical adenopathy  Skin:     General: Skin is warm and dry  Findings: No rash  Neurological:      Mental Status: She is alert and oriented to person, place, and time  Cranial Nerves: No cranial nerve deficit  Sensory: No sensory deficit  Motor: No abnormal muscle tone  Coordination: Coordination normal       Gait: Gait normal       Deep Tendon Reflexes: Reflexes are normal and symmetric  Psychiatric:         Behavior: Behavior normal          Thought Content:  Thought content normal          Judgment: Judgment normal          Vital Signs  ED Triage Vitals [09/21/21 0851]   Temperature Pulse Respirations Blood Pressure SpO2   98 4 °F (36 9 °C) 72 18 112/54 99 %      Temp Source Heart Rate Source Patient Position - Orthostatic VS BP Location FiO2 (%)   Oral Monitor Sitting Left arm --      Pain Score       Worst Possible Pain           Vitals:    09/21/21 0851 09/21/21 1107   BP: 112/54 104/57   Pulse: 72 55   Patient Position - Orthostatic VS: Sitting Sitting         Visual Acuity      ED Medications  Medications sodium chloride 0 9 % bolus 1,000 mL (0 mL Intravenous Stopped 9/21/21 1107)   ondansetron (ZOFRAN) injection 4 mg (4 mg Intravenous Given 9/21/21 0957)   dicyclomine (BENTYL) tablet 20 mg (20 mg Oral Given 9/21/21 0957)       Diagnostic Studies  Results Reviewed     Procedure Component Value Units Date/Time    Urine Microscopic [[id number]]  (Abnormal) Collected: 09/21/21 1157    Lab Status: Final result Specimen: Urine, Clean Catch Updated: 09/21/21 1246     RBC, UA 0-1 /hpf      WBC, UA 1-2 /hpf      Epithelial Cells Occasional /hpf      Bacteria, UA Moderate /hpf     Comprehensive metabolic panel [[id number]] Collected: 09/21/21 0954    Lab Status: Final result Specimen: Blood from Arm, Right Updated: 09/21/21 1220     Sodium 142 mmol/L      Potassium 4 8 mmol/L      Chloride 106 mmol/L      CO2 30 mmol/L      ANION GAP 6 mmol/L      BUN 15 mg/dL      Creatinine 0 91 mg/dL      Glucose 95 mg/dL      Calcium 8 8 mg/dL      AST 12 U/L      ALT 16 U/L      Alkaline Phosphatase 77 U/L      Total Protein 6 9 g/dL      Albumin 4 0 g/dL      Total Bilirubin 0 81 mg/dL      eGFR 84 ml/min/1 73sq m     Narrative:      [place] guidelines for Chronic Kidney Disease (CKD):     Stage 1 with normal or high GFR (GFR > 90 mL/min/1 73 square meters)    Stage 2 Mild CKD (GFR = 60-89 mL/min/1 73 square meters)    Stage 3A Moderate CKD (GFR = 45-59 mL/min/1 73 square meters)    Stage 3B Moderate CKD (GFR = 30-44 mL/min/1 73 square meters)    Stage 4 Severe CKD (GFR = 15-29 mL/min/1 73 square meters)    Stage 5 End Stage CKD (GFR <15 mL/min/1 73 square meters)  Note: GFR calculation is accurate only with a steady state creatinine    Lipase [[id number]]  (Normal) Collected: 09/21/21 0954    Lab Status: Final result Specimen: Blood from Arm, Right Updated: 09/21/21 1220     Lipase 81 u/L     POCT pregnancy, urine [[id number]]  (Normal) Resulted: 09/21/21 1200    Lab Status: Final result Updated: 09/21/21 1200     EXT PREG TEST UR (Ref: Negative) negative     Control valid    Urine Macroscopic, POC [[id number]]  (Abnormal) Collected: 09/21/21 1157    Lab Status: Final result Specimen: Urine Updated: 09/21/21 1158     Color, UA Yellow     Clarity, UA Clear     pH, UA 6 5     Leukocytes, UA Negative     Nitrite, UA Positive     Protein, UA Negative mg/dl      Glucose, UA Negative mg/dl      Ketones, UA 15 (1+) mg/dl      Urobilinogen, UA 0 2 E U /dl      Bilirubin, UA Negative     Blood, UA Negative     Specific Gravity, UA 1 015    Narrative:      CLINITEK RESULT    CBC and differential [[id number]] Collected: 09/21/21 0954    Lab Status: Final result Specimen: Blood from Arm, Right Updated: 09/21/21 1015     WBC 7 57 Thousand/uL      RBC 4 39 Million/uL      Hemoglobin 12 8 g/dL      Hematocrit 38 7 %      MCV 88 fL      MCH 29 2 pg      MCHC 33 1 g/dL      RDW 13 2 %      MPV 11 0 fL      Platelets 434 Thousands/uL      nRBC 0 /100 WBCs      Neutrophils Relative 61 %      Immat GRANS % 0 %      Lymphocytes Relative 28 %      Monocytes Relative 9 %      Eosinophils Relative 1 %      Basophils Relative 1 %      Neutrophils Absolute 4 61 Thousands/µL      Immature Grans Absolute 0 02 Thousand/uL      Lymphocytes Absolute 2 10 Thousands/µL      Monocytes Absolute 0 67 Thousand/µL      Eosinophils Absolute 0 10 Thousand/µL      Basophils Absolute 0 07 Thousands/µL                  No orders to display              Procedures  Procedures         ED Course                                           MDM  Number of Diagnoses or Management Options  Enteritis: new and requires workup  UTI (urinary tract infection): new and requires workup     Amount and/or Complexity of Data Reviewed  Clinical lab tests: ordered and reviewed  Decide to obtain previous medical records or to obtain history from someone other than the patient: yes  Independent visualization of images, tracings, or specimens: yes    Risk of Complications, Morbidity, and/or Mortality  General comments: 43-year-old female presents for evaluation of 10 day history of intermittent nausea with diarrhea  Patient's workup is reassuring  Blood work unremarkable  She has a benign abdominal exam and was able to tolerate oral intake in the department  She felt better after receiving Zofran and Bentyl  She does have moderate bacteria in the urine, nitrate positive  Will treat for uncomplicated UTI  Patient does note cloudy foul urine at home  Discussed signs and symptoms to return to the emergency department  Patient Progress  Patient progress: stable      Disposition  Final diagnoses:   Enteritis   UTI (urinary tract infection)     Time reflects when diagnosis was documented in both MDM as applicable and the Disposition within this note     Time User Action Codes Description Comment    9/21/2021 12:33 PM Maribell Paris Add [K52 9] Enteritis     9/21/2021 12:37 PM Maribell Paris Add [N39 0] UTI (urinary tract infection)       ED Disposition     ED Disposition Condition Date/Time Comment    Discharge Stable Tue Sep 21, 2021 12:33 PM Angelina Joaquin discharge to home/self care              Follow-up Information     Follow up With Specialties Details Why Contact Info Additional Information    Darrin Mehta MD Family Medicine Schedule an appointment as soon as possible for a visit  As needed, For recheck of current symptoms [address]   [phone]  [phone]  [address] [facility] Schedule an appointment as soon as possible for a visit  For recheck of current symptoms, If symptoms worsen Lino Mart[address]nez [address] [facility] [place] [address] [place], [place], [address]          Discharge Medication List as of 9/21/2021 12:38 PM      START taking these medications    Details   dicyclomine (BENTYL) 20 mg tablet Take 1 tablet (20 mg total) by mouth 4 (four) times a day as needed (Abdominal cramping, diarrhea), Starting Tue 9/21/2021, Normal      nitrofurantoin (MACROBID) 100 mg capsule Take 1 capsule (100 mg total) by mouth 2 (two) times a day, Starting Tue 9/21/2021, Normal         CONTINUE these medications which have CHANGED    Details   ondansetron (ZOFRAN-ODT) 4 mg disintegrating tablet Take 1 tablet (4 mg total) by mouth every 6 (six) hours as needed for nausea or vomiting, Starting Tue 9/21/2021, Normal           No discharge procedures on file      PDMP Review     None          ED Provider  Electronically Signed by           Asael Snider DO  09/21/21 6678

## 2021-11-03 ENCOUNTER — ANNUAL EXAM (OUTPATIENT)
Dept: OBGYN CLINIC | Facility: CLINIC | Age: 31
End: 2021-11-03
Payer: COMMERCIAL

## 2021-11-03 VITALS
SYSTOLIC BLOOD PRESSURE: 106 MMHG | HEIGHT: 66 IN | BODY MASS INDEX: 23.3 KG/M2 | DIASTOLIC BLOOD PRESSURE: 68 MMHG | WEIGHT: 145 LBS

## 2021-11-03 DIAGNOSIS — Z11.3 SCREENING FOR STD (SEXUALLY TRANSMITTED DISEASE): ICD-10-CM

## 2021-11-03 DIAGNOSIS — Z01.419 WOMEN'S ANNUAL ROUTINE GYNECOLOGICAL EXAMINATION: Primary | ICD-10-CM

## 2021-11-03 PROBLEM — E55.9 VITAMIN D DEFICIENCY: Status: ACTIVE | Noted: 2020-10-26

## 2021-11-03 PROCEDURE — 87591 N.GONORRHOEAE DNA AMP PROB: CPT | Performed by: NURSE PRACTITIONER

## 2021-11-03 PROCEDURE — 99395 PREV VISIT EST AGE 18-39: CPT | Performed by: NURSE PRACTITIONER

## 2021-11-03 PROCEDURE — 0503F POSTPARTUM CARE VISIT: CPT | Performed by: NURSE PRACTITIONER

## 2021-11-03 PROCEDURE — 87491 CHLMYD TRACH DNA AMP PROBE: CPT | Performed by: NURSE PRACTITIONER

## 2021-11-05 ENCOUNTER — TELEPHONE (OUTPATIENT)
Dept: OBGYN CLINIC | Facility: CLINIC | Age: 31
End: 2021-11-05

## 2021-11-05 LAB
C TRACH DNA SPEC QL NAA+PROBE: NEGATIVE
N GONORRHOEA DNA SPEC QL NAA+PROBE: NEGATIVE

## 2021-12-13 ENCOUNTER — OFFICE VISIT (OUTPATIENT)
Dept: OBGYN CLINIC | Facility: CLINIC | Age: 31
End: 2021-12-13
Payer: COMMERCIAL

## 2021-12-13 VITALS
WEIGHT: 150.2 LBS | HEIGHT: 66 IN | SYSTOLIC BLOOD PRESSURE: 100 MMHG | BODY MASS INDEX: 24.14 KG/M2 | DIASTOLIC BLOOD PRESSURE: 50 MMHG

## 2021-12-13 DIAGNOSIS — R10.9 ABDOMINAL PAIN, UNSPECIFIED ABDOMINAL LOCATION: ICD-10-CM

## 2021-12-13 DIAGNOSIS — R10.2 PELVIC PAIN: Primary | ICD-10-CM

## 2021-12-13 PROCEDURE — 99214 OFFICE O/P EST MOD 30 MIN: CPT | Performed by: OBSTETRICS & GYNECOLOGY

## 2022-01-11 ENCOUNTER — HOSPITAL ENCOUNTER (EMERGENCY)
Facility: HOSPITAL | Age: 32
Discharge: HOME | End: 2022-01-11
Attending: EMERGENCY MEDICINE | Admitting: EMERGENCY MEDICINE
Payer: COMMERCIAL

## 2022-01-11 VITALS
DIASTOLIC BLOOD PRESSURE: 71 MMHG | TEMPERATURE: 98 F | SYSTOLIC BLOOD PRESSURE: 121 MMHG | WEIGHT: 144.2 LBS | BODY MASS INDEX: 23.27 KG/M2 | HEART RATE: 75 BPM | RESPIRATION RATE: 18 BRPM | OXYGEN SATURATION: 100 %

## 2022-01-11 DIAGNOSIS — B34.9 VIRAL SYNDROME: Primary | ICD-10-CM

## 2022-01-11 DIAGNOSIS — R11.0 NAUSEA: ICD-10-CM

## 2022-01-11 DIAGNOSIS — Z20.822 ENCOUNTER FOR LABORATORY TESTING FOR COVID-19 VIRUS: ICD-10-CM

## 2022-01-11 PROCEDURE — U0003 INFECTIOUS AGENT DETECTION BY NUCLEIC ACID (DNA OR RNA); SEVERE ACUTE RESPIRATORY SYNDROME CORONAVIRUS 2 (SARS-COV-2) (CORONAVIRUS DISEASE [COVID-19]), AMPLIFIED PROBE TECHNIQUE, MAKING USE OF HIGH THROUGHPUT TECHNOLOGIES AS DESCRIBED BY CMS-2020-01-R: HCPCS | Performed by: EMERGENCY MEDICINE

## 2022-01-11 PROCEDURE — 99284 EMERGENCY DEPT VISIT MOD MDM: CPT | Performed by: EMERGENCY MEDICINE

## 2022-01-11 PROCEDURE — 99283 EMERGENCY DEPT VISIT LOW MDM: CPT

## 2022-01-11 PROCEDURE — U0005 INFEC AGEN DETEC AMPLI PROBE: HCPCS | Performed by: EMERGENCY MEDICINE

## 2022-01-11 RX ORDER — ONDANSETRON 4 MG/1
4 TABLET, ORALLY DISINTEGRATING ORAL EVERY 6 HOURS PRN
Qty: 10 TABLET | Refills: 0 | Status: SHIPPED | OUTPATIENT
Start: 2022-01-11

## 2022-01-12 NOTE — ED NOTES
Discharge instructions reviewed with patient by Dr Beto Maddox  Pt ambulated to waiting room in negative distress        Camila Lamar RN  01/11/22 1952

## 2022-01-12 NOTE — ED PROVIDER NOTES
History  Chief Complaint   Patient presents with    Biological Exposure     Pt reports feeling tired, chills, diarrhea, HA since , requesting covid test only  History provided by:  Patient   used: No     80-year-old female presented for evaluation of few days fatigue, chills, some diarrhea, headache  No fever, cough, congestion, shortness of breath, chest pain  Not vaccinated for COVID  Requesting COVID test   Vitals here are normal   She is here with her son who is sick with similar symptoms  Plan COVID test and discharge  Advised isolation  None       History reviewed  No pertinent past medical history  Past Surgical History:   Procedure Laterality Date    ABDOMINAL SURGERY       SECTION      COMBINED ABDOMINOPLASTY AND LIPOSUCTION      COSMETIC SURGERY      Pt states " Olinda"       Family History   Problem Relation Age of Onset    Ovarian cysts Mother     No Known Problems Father      I have reviewed and agree with the history as documented  E-Cigarette/Vaping    E-Cigarette Use Never User      E-Cigarette/Vaping Substances    Nicotine No     THC No     CBD No     Flavoring No     Other No      Social History     Tobacco Use    Smoking status: Former Smoker     Quit date: 2019     Years since quitting: 3 0    Smokeless tobacco: Never Used    Tobacco comment: Former smoker - As per [employer] Vaping Use: Never used   Substance Use Topics    Alcohol use: Never    Drug use: Yes     Types: Marijuana       Review of Systems   Constitutional: Positive for activity change, chills and fatigue  Negative for appetite change and fever  Respiratory: Negative for cough, chest tightness and shortness of breath  Gastrointestinal: Positive for diarrhea  Negative for abdominal pain and vomiting  Genitourinary: Negative for difficulty urinating and dysuria  Musculoskeletal: Negative for back pain, myalgias and neck pain  Skin: Negative for color change  Neurological: Positive for headaches  All other systems reviewed and are negative  Physical Exam  Physical Exam  Vitals and nursing note reviewed  Constitutional:       Appearance: Normal appearance  HENT:      Head: Normocephalic and atraumatic  Cardiovascular:      Rate and Rhythm: Normal rate and regular rhythm  Pulmonary:      Effort: Pulmonary effort is normal  No respiratory distress  Abdominal:      General: There is no distension  Palpations: Abdomen is soft  Tenderness: There is no abdominal tenderness  Musculoskeletal:         General: No swelling or tenderness  Normal range of motion  Cervical back: Normal range of motion and neck supple  Skin:     General: Skin is warm and dry  Neurological:      General: No focal deficit present  Mental Status: She is alert and oriented to person, place, and time  Mental status is at baseline     Psychiatric:         Mood and Affect: Mood normal          Behavior: Behavior normal          Vital Signs  ED Triage Vitals [01/11/22 1925]   Temperature Pulse Respirations Blood Pressure SpO2   98 °F (36 7 °C) 69 18 121/71 100 %      Temp Source Heart Rate Source Patient Position - Orthostatic VS BP Location FiO2 (%)   Oral Monitor Sitting Left arm --      Pain Score       --           Vitals:    01/11/22 1925 01/11/22 1926   BP: 121/71 121/71   Pulse: 69 75   Patient Position - Orthostatic VS: Sitting          Visual Acuity      ED Medications  Medications - No data to display    Diagnostic Studies  Results Reviewed     Procedure Component Value Units Date/Time    COVID only [[id number]]  (Normal) Collected: 01/11/22 1931    Lab Status: Final result Specimen: Nares from Nasopharyngeal Swab Updated: 01/13/22 0525     SARS-CoV-2 Negative    Narrative:      FOR PEDIATRIC PATIENTS - copy/paste COVID Guidelines URL to browser: [email or web address]    SARS-CoV-2 assay is a Nucleic Acid Amplification assay intended for the  qualitative detection of nucleic acid from SARS-CoV-2 in nasopharyngeal  swabs  Results are for the presumptive identification of SARS-CoV-2 RNA  Positive results are indicative of infection with SARS-CoV-2, the virus  causing COVID-19, but do not rule out bacterial infection or co-infection  with other viruses  Laboratories within the [place] and its  territories are required to report all positive results to the appropriate  public health authorities  Negative results do not preclude SARS-CoV-2  infection and should not be used as the sole basis for treatment or other  patient management decisions  Negative results must be combined with  clinical observations, patient history, and epidemiological information  This test has not been FDA cleared or approved  This test has been authorized by FDA under an Emergency Use Authorization  (EUA)  This test is only authorized for the duration of time the  declaration that circumstances exist justifying the authorization of the  emergency use of an in vitro diagnostic tests for detection of SARS-CoV-2  virus and/or diagnosis of COVID-19 infection under section 564(b)(1) of  the Act, 21 U  S C  216WRW-8(B)(8), unless the authorization is terminated  or revoked sooner  The test has been validated but independent review by FDA  and CLIA is pending  Test performed using the [employer] COVID-19 assay: This RT-PCR assay targets three regions of the SARS-CoV-2 genome, ORF1ab, N gene and S gene  Detection of at least two targets is interpreted as SARS-CoV-2 Detected                   No orders to display              Procedures  Procedures         ED Course                                             MDM  Number of Diagnoses or Management Options  Encounter for laboratory testing for COVID-19 virus: new and requires workup  Nausea: new and requires workup  Viral syndrome: new and requires workup  Diagnosis management comments: 60-year-old female with nausea without vomiting, headache, chills, body aches over the last few days  No cough or dyspnea or chest pain  Suspect viral illness  Testing for COVID  Stable for discharge  Supportive care as needed  Return if worse  Amount and/or Complexity of Data Reviewed  Clinical lab tests: ordered    Patient Progress  Patient progress: stable      Disposition  Final diagnoses:   Viral syndrome   Nausea   Encounter for laboratory testing for COVID-19 virus     Time reflects when diagnosis was documented in both MDM as applicable and the Disposition within this note     Time User Action Codes Description Comment    1/11/2022  7:47 PM Italo ISAACS Add [B34 9] Viral syndrome     1/11/2022  7:47 PM Italo ISAACS Add [R11 0] Nausea     1/11/2022  7:47 PM David Varela Add [Z20 822] Encounter for laboratory testing for COVID-19 virus       ED Disposition     ED Disposition Condition Date/Time Comment    Discharge Stable Tue Jan 11, 2022  7:47 PM Angelina Joaquin discharge to home/self care              Follow-up Information     Follow up With Specialties Details Why Contact Info Additional Information    Catarino Sanchez MD Family Medicine   [address] [facility]  [phone]  [phone]  [facility] Emergency Department Emergency Medicine  If symptoms worsen [address] Emergency Department, [address], [place], [place], [address]          Discharge Medication List as of 1/11/2022  7:48 PM      START taking these medications    Details   ondansetron (ZOFRAN-ODT) 4 mg disintegrating tablet Take 1 tablet (4 mg total) by mouth every 6 (six) hours as needed for nausea or vomiting, Starting Tue 1/11/2022, Normal             No discharge procedures on file      PDMP Review     None          ED Provider  Electronically Signed by           Josefina Parsons MD  01/30/22 2243

## 2022-01-13 LAB — SARS-COV-2 RNA RESP QL NAA+PROBE: NEGATIVE

## 2022-04-28 ENCOUNTER — HOSPITAL ENCOUNTER (EMERGENCY)
Facility: HOSPITAL | Age: 32
Discharge: HOME | End: 2022-04-28
Attending: EMERGENCY MEDICINE | Admitting: EMERGENCY MEDICINE
Payer: COMMERCIAL

## 2022-04-28 VITALS
DIASTOLIC BLOOD PRESSURE: 71 MMHG | HEART RATE: 71 BPM | TEMPERATURE: 98.7 F | RESPIRATION RATE: 20 BRPM | SYSTOLIC BLOOD PRESSURE: 112 MMHG | OXYGEN SATURATION: 99 %

## 2022-04-28 DIAGNOSIS — J06.9 UPPER RESPIRATORY TRACT INFECTION, UNSPECIFIED TYPE: Primary | ICD-10-CM

## 2022-04-28 PROCEDURE — 99282 EMERGENCY DEPT VISIT SF MDM: CPT

## 2022-04-28 PROCEDURE — 99282 EMERGENCY DEPT VISIT SF MDM: CPT | Performed by: EMERGENCY MEDICINE

## 2022-04-28 NOTE — ED PROVIDER NOTES
History  Chief Complaint   Patient presents with    Sore Throat     pt c/o sore throat, chills, and congestion that started last night, denies fever       History provided by:  Patient  Sore Throat  Location:  Generalized  Quality:  Aching  Severity:  Moderate  Onset quality:  Gradual  Duration:  1 day  Timing:  Constant  Progression:  Unchanged  Chronicity:  New  Relieved by:  None tried  Worsened by:  Nothing  Ineffective treatments:  None tried  Associated symptoms: adenopathy, postnasal drip and rhinorrhea    Associated symptoms: no abdominal pain, no chest pain, no chills, no cough, no fever, no headaches, no neck stiffness, no rash, no shortness of breath and no stridor        Prior to Admission Medications   Prescriptions Last Dose Informant Patient Reported? Taking?   ondansetron (ZOFRAN-ODT) 4 mg disintegrating tablet   No No   Sig: Take 1 tablet (4 mg total) by mouth every 6 (six) hours as needed for nausea or vomiting      Facility-Administered Medications: None       History reviewed  No pertinent past medical history  Past Surgical History:   Procedure Laterality Date    ABDOMINAL SURGERY       SECTION      COMBINED ABDOMINOPLASTY AND LIPOSUCTION      COSMETIC SURGERY      Pt states " Olinda"       Family History   Problem Relation Age of Onset    Ovarian cysts Mother     No Known Problems Father      I have reviewed and agree with the history as documented      E-Cigarette/Vaping    E-Cigarette Use Never User      E-Cigarette/Vaping Substances    Nicotine No     THC No     CBD No     Flavoring No     Other No      Social History     Tobacco Use    Smoking status: Former Smoker     Quit date: 2019     Years since quitting: 3 3    Smokeless tobacco: Never Used    Tobacco comment: Former smoker - As per [employer] Vaping Use: Never used   Substance Use Topics    Alcohol use: Never    Drug use: Yes     Types: Marijuana       Review of Systems Constitutional: Negative for activity change, chills, diaphoresis and fever  HENT: Positive for congestion, postnasal drip, rhinorrhea and sore throat  Negative for sinus pressure  Eyes: Negative for pain and visual disturbance  Respiratory: Negative for cough, chest tightness, shortness of breath, wheezing and stridor  Cardiovascular: Negative for chest pain and palpitations  Gastrointestinal: Negative for abdominal distention, abdominal pain, constipation, diarrhea, nausea and vomiting  Genitourinary: Negative for dysuria and frequency  Musculoskeletal: Negative for neck pain and neck stiffness  Skin: Negative for rash  Neurological: Negative for dizziness, speech difficulty, light-headedness, numbness and headaches  Hematological: Positive for adenopathy  Physical Exam  Physical Exam  Vitals reviewed  Constitutional:       General: She is not in acute distress  Appearance: She is well-developed  She is not diaphoretic  HENT:      Head: Normocephalic and atraumatic  Right Ear: External ear normal       Left Ear: External ear normal       Nose: Nose normal       Mouth/Throat:      Mouth: Mucous membranes are moist  No oral lesions  Pharynx: No pharyngeal swelling, oropharyngeal exudate or posterior oropharyngeal erythema  Eyes:      General:         Right eye: No discharge  Left eye: No discharge  Pupils: Pupils are equal, round, and reactive to light  Neck:      Trachea: No tracheal deviation  Cardiovascular:      Rate and Rhythm: Normal rate and regular rhythm  Heart sounds: Normal heart sounds  No murmur heard  Pulmonary:      Effort: Pulmonary effort is normal  No respiratory distress  Breath sounds: Normal breath sounds  No stridor  Abdominal:      General: There is no distension  Palpations: Abdomen is soft  Tenderness: There is no abdominal tenderness  There is no guarding or rebound     Musculoskeletal: General: Normal range of motion  Cervical back: Normal range of motion and neck supple  Skin:     General: Skin is warm and dry  Coloration: Skin is not pale  Findings: No erythema  Neurological:      General: No focal deficit present  Mental Status: She is alert and oriented to person, place, and time  Vital Signs  ED Triage Vitals [04/28/22 0818]   Temperature Pulse Respirations Blood Pressure SpO2   98 7 °F (37 1 °C) 71 20 112/71 99 %      Temp Source Heart Rate Source Patient Position - Orthostatic VS BP Location FiO2 (%)   Oral Monitor Sitting Left arm --      Pain Score       --           Vitals:    04/28/22 0818   BP: 112/71   Pulse: 71   Patient Position - Orthostatic VS: Sitting         Visual Acuity      ED Medications  Medications - No data to display    Diagnostic Studies  Results Reviewed     None                 No orders to display              Procedures  Procedures         ED Course                                             MDM  Number of Diagnoses or Management Options  Upper respiratory tract infection, unspecified type: new and requires workup  Diagnosis management comments:       Patient previously healthy prior to acute illness, no evidence of serious bacterial infection  TMs clear, no evidence of acute otitis media, lungs clear to auscultation with high normal pulse ox, no evidence of pneumonia  Abdomen benign all quadrants making intra-abdominal infection far less likely  We'll treat as viral URI at this time  No evidence of severe dehydration, no indication at this time for IV fluids   We'll treat with Tylenol/Motrin for fevers        Amount and/or Complexity of Data Reviewed  Decide to obtain previous medical records or to obtain history from someone other than the patient: yes  Obtain history from someone other than the patient: yes  Review and summarize past medical records: yes        Disposition  Final diagnoses:   Upper respiratory tract infection, unspecified type     Time reflects when diagnosis was documented in both MDM as applicable and the Disposition within this note     Time User Action Codes Description Comment    4/28/2022  9:01 AM Delma Can Add [J06 9] Upper respiratory tract infection, unspecified type       ED Disposition     ED Disposition Condition Date/Time Comment    Discharge Stable Thu Apr 28, 2022  9:01 AM Angelina Moore discharge to home/self care  Follow-up Information    None         Discharge Medication List as of 4/28/2022  9:01 AM      CONTINUE these medications which have NOT CHANGED    Details   ondansetron (ZOFRAN-ODT) 4 mg disintegrating tablet Take 1 tablet (4 mg total) by mouth every 6 (six) hours as needed for nausea or vomiting, Starting Tue 1/11/2022, Normal             No discharge procedures on file      PDMP Review     None          ED Provider  Electronically Signed by           Sherry Paz DO  04/28/22 5795

## 2022-05-11 ENCOUNTER — HOSPITAL ENCOUNTER (EMERGENCY)
Facility: HOSPITAL | Age: 32
Discharge: HOME | End: 2022-05-11
Attending: EMERGENCY MEDICINE
Payer: COMMERCIAL

## 2022-05-11 ENCOUNTER — APPOINTMENT (EMERGENCY)
Dept: ULTRASOUND IMAGING | Facility: HOSPITAL | Age: 32
End: 2022-05-11
Payer: COMMERCIAL

## 2022-05-11 VITALS
SYSTOLIC BLOOD PRESSURE: 108 MMHG | TEMPERATURE: 98.6 F | DIASTOLIC BLOOD PRESSURE: 67 MMHG | OXYGEN SATURATION: 97 % | HEART RATE: 86 BPM | RESPIRATION RATE: 18 BRPM

## 2022-05-11 DIAGNOSIS — O36.80X0 PREGNANCY OF UNKNOWN ANATOMIC LOCATION: Primary | ICD-10-CM

## 2022-05-11 DIAGNOSIS — O20.0 THREATENED MISCARRIAGE IN EARLY PREGNANCY: ICD-10-CM

## 2022-05-11 LAB
ABO GROUP BLD: NORMAL
ALBUMIN SERPL BCP-MCNC: 4.1 G/DL (ref 3.5–5)
ALP SERPL-CCNC: 65 U/L (ref 34–104)
ALT SERPL W P-5'-P-CCNC: 24 U/L (ref 7–52)
ANION GAP SERPL CALCULATED.3IONS-SCNC: 5 MMOL/L (ref 4–13)
AST SERPL W P-5'-P-CCNC: 25 U/L (ref 13–39)
B-HCG SERPL-ACNC: 939 MIU/ML (ref 0–11.6)
BASOPHILS # BLD AUTO: 0.08 THOUSANDS/ΜL (ref 0–0.1)
BASOPHILS NFR BLD AUTO: 1 % (ref 0–1)
BILIRUB SERPL-MCNC: 0.52 MG/DL (ref 0.2–1)
BILIRUB UR QL STRIP: NEGATIVE
BLD GP AB SCN SERPL QL: NEGATIVE
BUN SERPL-MCNC: 18 MG/DL (ref 5–25)
CALCIUM SERPL-MCNC: 9.1 MG/DL (ref 8.4–10.2)
CHLORIDE SERPL-SCNC: 108 MMOL/L (ref 96–108)
CLARITY UR: CLEAR
CO2 SERPL-SCNC: 28 MMOL/L (ref 21–32)
COLOR UR: YELLOW
CREAT SERPL-MCNC: 1.45 MG/DL (ref 0.6–1.3)
EOSINOPHIL # BLD AUTO: 0.12 THOUSAND/ΜL (ref 0–0.61)
EOSINOPHIL NFR BLD AUTO: 1 % (ref 0–6)
ERYTHROCYTE [DISTWIDTH] IN BLOOD BY AUTOMATED COUNT: 13.3 % (ref 11.6–15.1)
EXT PREG TEST URINE: POSITIVE
EXT. CONTROL ED NAV: ABNORMAL
GFR SERPL CREATININE-BSD FRML MDRD: 47 ML/MIN/1.73SQ M
GLUCOSE SERPL-MCNC: 88 MG/DL (ref 65–140)
GLUCOSE UR STRIP-MCNC: NEGATIVE MG/DL
HCT VFR BLD AUTO: 37.4 % (ref 34.8–46.1)
HGB BLD-MCNC: 12.3 G/DL (ref 11.5–15.4)
HGB UR QL STRIP.AUTO: NEGATIVE
IMM GRANULOCYTES # BLD AUTO: 0.03 THOUSAND/UL (ref 0–0.2)
IMM GRANULOCYTES NFR BLD AUTO: 0 % (ref 0–2)
KETONES UR STRIP-MCNC: ABNORMAL MG/DL
LEUKOCYTE ESTERASE UR QL STRIP: NEGATIVE
LIPASE SERPL-CCNC: 30 U/L (ref 11–82)
LYMPHOCYTES # BLD AUTO: 1.89 THOUSANDS/ΜL (ref 0.6–4.47)
LYMPHOCYTES NFR BLD AUTO: 18 % (ref 14–44)
MCH RBC QN AUTO: 28.3 PG (ref 26.8–34.3)
MCHC RBC AUTO-ENTMCNC: 32.9 G/DL (ref 31.4–37.4)
MCV RBC AUTO: 86 FL (ref 82–98)
MONOCYTES # BLD AUTO: 0.81 THOUSAND/ΜL (ref 0.17–1.22)
MONOCYTES NFR BLD AUTO: 8 % (ref 4–12)
NEUTROPHILS # BLD AUTO: 7.61 THOUSANDS/ΜL (ref 1.85–7.62)
NEUTS SEG NFR BLD AUTO: 72 % (ref 43–75)
NITRITE UR QL STRIP: NEGATIVE
NRBC BLD AUTO-RTO: 0 /100 WBCS
PH UR STRIP.AUTO: 7 [PH] (ref 4.5–8)
PLATELET # BLD AUTO: 257 THOUSANDS/UL (ref 149–390)
PMV BLD AUTO: 10.5 FL (ref 8.9–12.7)
POTASSIUM SERPL-SCNC: 5 MMOL/L (ref 3.5–5.3)
PROT SERPL-MCNC: 6.7 G/DL (ref 6.4–8.4)
PROT UR STRIP-MCNC: NEGATIVE MG/DL
RBC # BLD AUTO: 4.34 MILLION/UL (ref 3.81–5.12)
RH BLD: POSITIVE
SODIUM SERPL-SCNC: 141 MMOL/L (ref 135–147)
SP GR UR STRIP.AUTO: 1.02 (ref 1–1.03)
SPECIMEN EXPIRATION DATE: NORMAL
UROBILINOGEN UR QL STRIP.AUTO: 0.2 E.U./DL
WBC # BLD AUTO: 10.54 THOUSAND/UL (ref 4.31–10.16)

## 2022-05-11 PROCEDURE — 99284 EMERGENCY DEPT VISIT MOD MDM: CPT | Performed by: EMERGENCY MEDICINE

## 2022-05-11 PROCEDURE — 76815 OB US LIMITED FETUS(S): CPT

## 2022-05-11 PROCEDURE — 36415 COLL VENOUS BLD VENIPUNCTURE: CPT

## 2022-05-11 PROCEDURE — 86850 RBC ANTIBODY SCREEN: CPT | Performed by: EMERGENCY MEDICINE

## 2022-05-11 PROCEDURE — 85025 COMPLETE CBC W/AUTO DIFF WBC: CPT | Performed by: EMERGENCY MEDICINE

## 2022-05-11 PROCEDURE — 83690 ASSAY OF LIPASE: CPT | Performed by: EMERGENCY MEDICINE

## 2022-05-11 PROCEDURE — 81025 URINE PREGNANCY TEST: CPT | Performed by: EMERGENCY MEDICINE

## 2022-05-11 PROCEDURE — 86901 BLOOD TYPING SEROLOGIC RH(D): CPT | Performed by: EMERGENCY MEDICINE

## 2022-05-11 PROCEDURE — 80053 COMPREHEN METABOLIC PANEL: CPT | Performed by: EMERGENCY MEDICINE

## 2022-05-11 PROCEDURE — 81003 URINALYSIS AUTO W/O SCOPE: CPT

## 2022-05-11 PROCEDURE — 96360 HYDRATION IV INFUSION INIT: CPT

## 2022-05-11 PROCEDURE — 86900 BLOOD TYPING SEROLOGIC ABO: CPT | Performed by: EMERGENCY MEDICINE

## 2022-05-11 PROCEDURE — 84702 CHORIONIC GONADOTROPIN TEST: CPT | Performed by: EMERGENCY MEDICINE

## 2022-05-11 PROCEDURE — 99284 EMERGENCY DEPT VISIT MOD MDM: CPT

## 2022-05-11 RX ADMIN — SODIUM CHLORIDE 1000 ML: 0.9 INJECTION, SOLUTION INTRAVENOUS at 22:30

## 2022-05-13 ENCOUNTER — LAB (OUTPATIENT)
Dept: LAB | Facility: HOSPITAL | Age: 32
End: 2022-05-13
Attending: EMERGENCY MEDICINE
Payer: COMMERCIAL

## 2022-05-13 DIAGNOSIS — O20.0 THREATENED MISCARRIAGE IN EARLY PREGNANCY: ICD-10-CM

## 2022-05-13 DIAGNOSIS — O36.80X0 PREGNANCY OF UNKNOWN ANATOMIC LOCATION: ICD-10-CM

## 2022-05-13 LAB — B-HCG SERPL-ACNC: 1431 MIU/ML

## 2022-05-13 PROCEDURE — 84702 CHORIONIC GONADOTROPIN TEST: CPT

## 2022-05-13 PROCEDURE — 36415 COLL VENOUS BLD VENIPUNCTURE: CPT

## 2022-05-13 NOTE — RESULT ENCOUNTER NOTE
Patient called at [phone]  Name and date of birth use is positive patient identifiers  Discussed test results showing suboptimal rise and quantitative hCG  Patient not currently experiencing any pain or bleeding  States she has a follow-up with her OBGYN at [place] [id number] in 3 weeks  Discussed with her that this is not an ideal time frame and she will need to be seen sooner  I reached out to the OBGYN resident on-call regarding follow-up  Will review chart

## 2022-05-17 NOTE — ED PROVIDER NOTES
History  Chief Complaint   Patient presents with    Abdominal Pain     Pt states her period was late 6 days last month and then a short period, this month pt is a week late and tested positive for pregnancy, c/o abd cramping and also where her liposuction surgery was 1 year ago       26-year-old female presents the emergency department for evaluation of abdominal cramping  Patient states that she had very irregular menstrual cycles until last month  Last month her period was 6 days late  She then had a short period lasting only 2 to 3 days  She had a negative pregnancy test at that time (4/6/22)  Patient states that she has missed her menstrual cycle this month  She was due for her menses 1 week ago  She took a home pregnancy test that was positive  Patient states that she has been having intermittent abdominal cramping  She denies vaginal bleeding  Patient states that she had a abdominal plasty 1 year ago  She experiences occasional tingling type pain along her scar  Patient reports breast tenderness, no nausea vomiting  History provided by:  Patient and medical records   used: No    Abdominal Pain  Pain location:  Generalized  Pain quality: cramping    Pain radiates to:  Does not radiate  Pain severity:  Mild  Onset quality:  Unable to specify  Timing:  Intermittent  Progression:  Waxing and waning  Chronicity:  New  Context: not awakening from sleep, not eating, not previous surgeries, not sick contacts and not suspicious food intake    Relieved by:  Nothing  Worsened by:  Nothing  Ineffective treatments:  None tried  Associated symptoms: no anorexia, no chest pain, no chills, no constipation, no diarrhea, no dysuria, no fever, no flatus, no nausea, no vaginal bleeding, no vaginal discharge and no vomiting    Risk factors: no recent hospitalization        Prior to Admission Medications   Prescriptions Last Dose Informant Patient Reported?  Taking?   ondansetron (ZOFRAN-ODT) 4 mg disintegrating tablet   No No   Sig: Take 1 tablet (4 mg total) by mouth every 6 (six) hours as needed for nausea or vomiting      Facility-Administered Medications: None       History reviewed  No pertinent past medical history  Past Surgical History:   Procedure Laterality Date    ABDOMINAL SURGERY       SECTION      COMBINED ABDOMINOPLASTY AND LIPOSUCTION      COSMETIC SURGERY      Pt states " [facility]"       Family History   Problem Relation Age of Onset    Ovarian cysts Mother     No Known Problems Father      I have reviewed and agree with the history as documented  E-Cigarette/Vaping    E-Cigarette Use Never User      E-Cigarette/Vaping Substances    Nicotine No     THC No     CBD No     Flavoring No     Other No      Social History     Tobacco Use    Smoking status: Former Smoker     Quit date:      Years since quitting: 3 3    Smokeless tobacco: Never Used    Tobacco comment: Former smoker - As per [employer] Vaping Use: Never used   Substance Use Topics    Alcohol use: Never    Drug use: Yes     Types: Marijuana       Review of Systems   Constitutional: Negative for chills and fever  Cardiovascular: Negative for chest pain, palpitations and leg swelling  Gastrointestinal: Positive for abdominal pain  Negative for anorexia, constipation, diarrhea, flatus, nausea and vomiting  Genitourinary: Negative for dysuria, flank pain, vaginal bleeding and vaginal discharge  Musculoskeletal: Negative for back pain  All other systems reviewed and are negative  Physical Exam  Physical Exam  Vitals and nursing note reviewed  Constitutional:       General: She is not in acute distress  Appearance: She is well-developed  HENT:      Head: Normocephalic  Nose: Nose normal       Mouth/Throat:      Pharynx: No oropharyngeal exudate  Eyes:      General: No scleral icterus       Conjunctiva/sclera: Conjunctivae normal       Pupils: Pupils are equal, round, and reactive to light  Cardiovascular:      Rate and Rhythm: Normal rate and regular rhythm  Heart sounds: Normal heart sounds  Pulmonary:      Effort: Pulmonary effort is normal       Breath sounds: Normal breath sounds  No wheezing or rhonchi  Abdominal:      General: Bowel sounds are normal  There is no distension  Palpations: Abdomen is soft  Tenderness: There is generalized abdominal tenderness  There is no right CVA tenderness, left CVA tenderness, guarding or rebound  Hernia: No hernia is present  Musculoskeletal:         General: No tenderness or deformity  Normal range of motion  Cervical back: Normal range of motion and neck supple  Lymphadenopathy:      Cervical: No cervical adenopathy  Skin:     General: Skin is warm and dry  Findings: No rash  Neurological:      Mental Status: She is alert and oriented to person, place, and time  Cranial Nerves: No cranial nerve deficit  Sensory: No sensory deficit  Motor: No abnormal muscle tone  Coordination: Coordination normal       Gait: Gait normal       Deep Tendon Reflexes: Reflexes are normal and symmetric  Psychiatric:         Behavior: Behavior normal          Thought Content:  Thought content normal          Judgment: Judgment normal          Vital Signs  ED Triage Vitals [05/11/22 2004]   Temperature Pulse Respirations Blood Pressure SpO2   98 6 °F (37 °C) 86 18 108/67 97 %      Temp Source Heart Rate Source Patient Position - Orthostatic VS BP Location FiO2 (%)   Oral Monitor Sitting Right arm --      Pain Score       --           Vitals:    05/11/22 2004   BP: 108/67   Pulse: 86   Patient Position - Orthostatic VS: Sitting         Visual Acuity      ED Medications  Medications   sodium chloride 0 9 % bolus 1,000 mL (0 mL Intravenous Stopped 5/11/22 5074)       Diagnostic Studies  Results Reviewed     Procedure Component Value Units Date/Time    POCT pregnancy, urine [[id number]]  (Abnormal) Resulted: 05/11/22 2203    Lab Status: Final result Updated: 05/11/22 2203     EXT PREG TEST UR (Ref: Negative) positive     Control valid    Urine Macroscopic, POC [[id number]]  (Abnormal) Collected: 05/11/22 2200    Lab Status: Final result Specimen: Urine Updated: 05/11/22 2201     Color, UA Yellow     Clarity, UA Clear     pH, UA 7 0     Leukocytes, UA Negative     Nitrite, UA Negative     Protein, UA Negative mg/dl      Glucose, UA Negative mg/dl      Ketones, UA Trace mg/dl      Urobilinogen, UA 0 2 E U /dl      Bilirubin, UA Negative     Blood, UA Negative     Specific Gravity, UA 1 020    Narrative:      CLINITEK RESULT    hCG, quantitative [[id number]]  (Abnormal) Collected: 05/11/22 2046    Lab Status: Final result Specimen: Blood from Arm, Right Updated: 05/11/22 2159     HCG, Quant 939 mIU/mL     Narrative:       Expected Ranges:     Approximate               Approximate HCG  Gestation age          Concentration ( mIU/mL)  _____________          ______________________   Claretta Simmers                      HCG values  0 2-1                       5-50  1-2                           2-3                         100-5000  3-4                         500-72846  4-5                         1000-83259  5-6                         [id number]  6-8                         [id number]  8-12                        [id number]      Comprehensive metabolic panel [[id number]]  (Abnormal) Collected: 05/11/22 2046    Lab Status: Final result Specimen: Blood from Arm, Right Updated: 05/11/22 2116     Sodium 141 mmol/L      Potassium 5 0 mmol/L      Chloride 108 mmol/L      CO2 28 mmol/L      ANION GAP 5 mmol/L      BUN 18 mg/dL      Creatinine 1 45 mg/dL      Glucose 88 mg/dL      Calcium 9 1 mg/dL      AST 25 U/L      ALT 24 U/L      Alkaline Phosphatase 65 U/L      Total Protein 6 7 g/dL      Albumin 4 1 g/dL      Total Bilirubin 0 52 mg/dL      eGFR 47 ml/min/1 73sq m     Narrative:      [employer] guidelines for Chronic Kidney Disease (CKD):     Stage 1 with normal or high GFR (GFR > 90 mL/min/1 73 square meters)    Stage 2 Mild CKD (GFR = 60-89 mL/min/1 73 square meters)    Stage 3A Moderate CKD (GFR = 45-59 mL/min/1 73 square meters)    Stage 3B Moderate CKD (GFR = 30-44 mL/min/1 73 square meters)    Stage 4 Severe CKD (GFR = 15-29 mL/min/1 73 square meters)    Stage 5 End Stage CKD (GFR <15 mL/min/1 73 square meters)  Note: GFR calculation is accurate only with a steady state creatinine    Lipase [[id number]]  (Normal) Collected: 22    Lab Status: Final result Specimen: Blood from Arm, Right Updated: 22     Lipase 30 u/L     CBC and differential [[id number]]  (Abnormal) Collected: 22    Lab Status: Final result Specimen: Blood from Arm, Right Updated: 22     WBC 10 54 Thousand/uL      RBC 4 34 Million/uL      Hemoglobin 12 3 g/dL      Hematocrit 37 4 %      MCV 86 fL      MCH 28 3 pg      MCHC 32 9 g/dL      RDW 13 3 %      MPV 10 5 fL      Platelets 135 Thousands/uL      nRBC 0 /100 WBCs      Neutrophils Relative 72 %      Immat GRANS % 0 %      Lymphocytes Relative 18 %      Monocytes Relative 8 %      Eosinophils Relative 1 %      Basophils Relative 1 %      Neutrophils Absolute 7 61 Thousands/µL      Immature Grans Absolute 0 03 Thousand/uL      Lymphocytes Absolute 1 89 Thousands/µL      Monocytes Absolute 0 81 Thousand/µL      Eosinophils Absolute 0 12 Thousand/µL      Basophils Absolute 0 08 Thousands/µL                  US OB pregnancy limited with transvaginal   Final Result by Tapan Garner DO ([id number])   No intrauterine gestation or adnexal mass identified  Differential remains early IUP, spontaneous  and ectopic pregnancy  Correlate with serial quantitative BHCG        Right-sided corpus luteal cyst      Fibroid uterus            Workstation performed: [id number]                    Procedures  Procedures ED Course                                             MDM  Number of Diagnoses or Management Options  Pregnancy of unknown anatomic location: new and requires workup  Threatened miscarriage in early pregnancy: new and requires workup     Amount and/or Complexity of Data Reviewed  Clinical lab tests: ordered and reviewed  Tests in the radiology section of CPT®: ordered and reviewed  Decide to obtain previous medical records or to obtain history from someone other than the patient: yes  Independent visualization of images, tracings, or specimens: yes    Risk of Complications, Morbidity, and/or Mortality  General comments: 80-year-old female  at approximately 4 weeks gestational age presents with abdominal cramping and a positive pregnancy test   Patient paste hCG quant is low this could be consistent with a very early pregnancy which is consistent with patient's dates  The patient's ultrasound does not demonstrate an IUP  Patient informed that she will need close short-term follow-up to ensure that she has an intrauterine pregnancy  She is to return to the emergency department should she develop pain or vaginal bleeding  Patient plans to follow-up with her OBGYN at [facility]   I provided her the prescription for repeat beta-hCG quant to be drawn in the next 2 to 3 days      Patient Progress  Patient progress: stable      Disposition  Final diagnoses:   Pregnancy of unknown anatomic location   Threatened miscarriage in early pregnancy     Time reflects when diagnosis was documented in both MDM as applicable and the Disposition within this note     Time User Action Codes Description Comment    2022 11:31 PM Maribell Paris Add [O36 80X0] Pregnancy of unknown anatomic location     2022 11:31 PM Maribell Paris Add [O20 0] Threatened miscarriage in early pregnancy       ED Disposition     ED Disposition   Discharge    Condition   Stable    Date/Time   Wed May 11, 2022 11:31 PM    Comment Angelina ELIANA Jemal discharge to home/self care  Follow-up Information     Follow up With Specialties Details Why Contact Info Additional [address] [facility] Schedule an appointment as soon as possible for a visit in 2 days For recheck of current symptoms, follow up threatened miscarriage vs  early pregnancy [address] [place] [id number]  [place], [place], [address] [phone]          Discharge Medication List as of 5/11/2022 11:33 PM      CONTINUE these medications which have NOT CHANGED    Details   ondansetron (ZOFRAN-ODT) 4 mg disintegrating tablet Take 1 tablet (4 mg total) by mouth every 6 (six) hours as needed for nausea or vomiting, Starting Tue 1/11/2022, Normal             Outpatient Discharge Orders   hCG, quantitative   Standing Status: Future Number of Occurrences: 1 Standing Exp   Date: 05/11/23       PDMP Review     None          ED Provider  Electronically Signed by           Chester Edgar DO  05/17/22 0154

## 2022-05-19 ENCOUNTER — ROUTINE PRENATAL (OUTPATIENT)
Dept: OBGYN CLINIC | Facility: CLINIC | Age: 32
End: 2022-05-19

## 2022-05-19 ENCOUNTER — APPOINTMENT (OUTPATIENT)
Dept: LAB | Facility: HOSPITAL | Age: 32
End: 2022-05-19
Payer: COMMERCIAL

## 2022-05-19 VITALS
BODY MASS INDEX: 22.82 KG/M2 | SYSTOLIC BLOOD PRESSURE: 111 MMHG | WEIGHT: 142 LBS | RESPIRATION RATE: 18 BRPM | DIASTOLIC BLOOD PRESSURE: 73 MMHG | HEART RATE: 78 BPM | HEIGHT: 66 IN

## 2022-05-19 DIAGNOSIS — O20.0 THREATENED ABORTION: ICD-10-CM

## 2022-05-19 DIAGNOSIS — R11.0 NAUSEA: ICD-10-CM

## 2022-05-19 DIAGNOSIS — O36.80X0 ENCOUNTER TO DETERMINE FETAL VIABILITY OF PREGNANCY, SINGLE OR UNSPECIFIED FETUS: Primary | ICD-10-CM

## 2022-05-19 LAB — B-HCG SERPL-ACNC: ABNORMAL MIU/ML

## 2022-05-19 PROCEDURE — 36415 COLL VENOUS BLD VENIPUNCTURE: CPT

## 2022-05-19 PROCEDURE — 84702 CHORIONIC GONADOTROPIN TEST: CPT

## 2022-05-19 PROCEDURE — 99214 OFFICE O/P EST MOD 30 MIN: CPT

## 2022-05-19 PROCEDURE — 76817 TRANSVAGINAL US OBSTETRIC: CPT | Performed by: OBSTETRICS & GYNECOLOGY

## 2022-05-19 RX ORDER — DIPHENHYDRAMINE HYDROCHLORIDE 25 MG/1
25 CAPSULE ORAL DAILY
Qty: 30 TABLET | Refills: 3 | Status: SHIPPED | OUTPATIENT
Start: 2022-05-19

## 2022-05-19 NOTE — PROGRESS NOTES
[address]   Viability Scan  Name: Rajni Denton  MRN: [id number]  : 1990      ASSESSMENT/PLAN:  Problem   Threatened     Positive hCG quants with inappropriate, lower than expected rise in 48h  Ultrasound assessment negative for intrauterine pregnancy  No ectopic implantations immediately identified on imaging  Plan for repeat serial quants with follow-up imaging in 2 weeks  Vit B6/unisom for nausea management           SUBJECTIVE:  Patient presents for assessment of intrauterine pregnancy  Endorses mild nausea, denies vomiting  Recent home pregnancy test and visit to the ED with abdominal pain  Denies vaginal bleeding or passage of large clots or tissue  Transvaginal ultrasound without intrauterine gestation or adnexal masses identified  Recommended repeat imaging and presented to clinic  OBJECTIVE:  Vitals:    22 1336   BP: 111/73   Pulse: 78   Resp: 18     FIRST TRIMESTER OBSTETRIC ULTRASOUND  22  Nat Carpenter MD     INDICATION: Amenorrhea, viability    COMPARISON: None  TECHNIQUE:   Transvaginal imaging was performed to assess the gestation, myometrial/endometrial architecture and ovarian parenchymal detail  The study includes volumetric sweeps and traditional still imaging technique  FINDINGS:     No gestational sac, yolk sac, or fetal pole identified  Left ovary visualized with no abnormal vascularity appreciated on Doppler  Right ovary poorly visualized, no abnormalities in vascularity on Doppler in surrounding tissue       UTERUS/ADNEXA:   No adnexal mass or pathologic cyst   No free fluid identified  IMPRESSION:    No intrauterine pregnancy identified  No adnexal masses seen  Recommend follow-up serial hCG quantitative assessments with repeat imaging in 2 weeks  Physical Exam  Constitutional:       General: She is not in acute distress  HENT:      Head: Normocephalic        Right Ear: External ear normal       Left Ear: External ear normal    Eyes:      General: No scleral icterus  Right eye: No discharge  Left eye: No discharge  Conjunctiva/sclera: Conjunctivae normal    Cardiovascular:      Rate and Rhythm: Normal rate  Pulses: Normal pulses  Pulmonary:      Effort: Pulmonary effort is normal  No respiratory distress  Abdominal:      General: Abdomen is flat  There is no distension  Palpations: Abdomen is soft  Tenderness: There is no abdominal tenderness  There is no guarding  Genitourinary:     General: Normal vulva  Musculoskeletal:         General: No swelling or tenderness  Normal range of motion  Cervical back: Normal range of motion  Right lower leg: No edema  Left lower leg: No edema  Skin:     General: Skin is warm and dry  Capillary Refill: Capillary refill takes less than 2 seconds  Neurological:      Mental Status: She is alert and oriented to person, place, and time  Mental status is at baseline     Psychiatric:         Mood and Affect: Mood normal          Behavior: Behavior normal       Comments: Tearful s/p absent IUP on TVUS               Rosi Khan MD  OB/GYN PGY-1  5/19/2022  2:32 PM [employer]

## 2022-05-20 ENCOUNTER — TELEPHONE (OUTPATIENT)
Dept: OBGYN CLINIC | Facility: CLINIC | Age: 32
End: 2022-05-20

## 2022-05-20 NOTE — TELEPHONE ENCOUNTER
Good Morning Dr Janey Pope called stating she was sent to the lab to check her HCG levels  She stated she was told that she would need to go back 48 hours after her first draw and on accident both tubes were drawn  She called asking if another order could be placed so that she could go back tomorrow  She also wanted to know that results of those levels  Please advise    Thank you!

## 2022-05-21 ENCOUNTER — LAB (OUTPATIENT)
Dept: LAB | Facility: HOSPITAL | Age: 32
End: 2022-05-21
Payer: COMMERCIAL

## 2022-05-21 DIAGNOSIS — E55.9 VITAMIN D DEFICIENCY: ICD-10-CM

## 2022-05-21 DIAGNOSIS — Z13.29 SCREENING FOR HYPOTHYROIDISM: ICD-10-CM

## 2022-05-21 DIAGNOSIS — Z00.00 ROUTINE GENERAL MEDICAL EXAMINATION AT A HEALTH CARE FACILITY: ICD-10-CM

## 2022-05-21 DIAGNOSIS — N91.2 AMENORRHEA: ICD-10-CM

## 2022-05-21 DIAGNOSIS — O20.0 THREATENED ABORTION: ICD-10-CM

## 2022-05-21 DIAGNOSIS — O20.0 THREATENED ABORTION: Primary | ICD-10-CM

## 2022-05-21 LAB
25(OH)D3 SERPL-MCNC: 31.2 NG/ML (ref 30–100)
ALBUMIN SERPL BCP-MCNC: 3.8 G/DL (ref 3.5–5)
ALP SERPL-CCNC: 73 U/L (ref 46–116)
ALT SERPL W P-5'-P-CCNC: 19 U/L (ref 12–78)
ANION GAP SERPL CALCULATED.3IONS-SCNC: 2 MMOL/L (ref 4–13)
AST SERPL W P-5'-P-CCNC: 10 U/L (ref 5–45)
B-HCG SERPL-ACNC: ABNORMAL MIU/ML
BASOPHILS # BLD AUTO: 0.06 THOUSANDS/ΜL (ref 0–0.1)
BASOPHILS NFR BLD AUTO: 1 % (ref 0–1)
BILIRUB SERPL-MCNC: 0.89 MG/DL (ref 0.2–1)
BUN SERPL-MCNC: 13 MG/DL (ref 5–25)
CALCIUM SERPL-MCNC: 9.5 MG/DL (ref 8.3–10.1)
CHLORIDE SERPL-SCNC: 107 MMOL/L (ref 100–108)
CO2 SERPL-SCNC: 30 MMOL/L (ref 21–32)
CREAT SERPL-MCNC: 0.81 MG/DL (ref 0.6–1.3)
EOSINOPHIL # BLD AUTO: 0.06 THOUSAND/ΜL (ref 0–0.61)
EOSINOPHIL NFR BLD AUTO: 1 % (ref 0–6)
ERYTHROCYTE [DISTWIDTH] IN BLOOD BY AUTOMATED COUNT: 13.5 % (ref 11.6–15.1)
GFR SERPL CREATININE-BSD FRML MDRD: 96 ML/MIN/1.73SQ M
GLUCOSE SERPL-MCNC: 80 MG/DL (ref 65–140)
HCT VFR BLD AUTO: 37.2 % (ref 34.8–46.1)
HCV AB SER QL: NORMAL
HGB BLD-MCNC: 12.3 G/DL (ref 11.5–15.4)
IMM GRANULOCYTES # BLD AUTO: 0.03 THOUSAND/UL (ref 0–0.2)
IMM GRANULOCYTES NFR BLD AUTO: 0 % (ref 0–2)
LYMPHOCYTES # BLD AUTO: 1.42 THOUSANDS/ΜL (ref 0.6–4.47)
LYMPHOCYTES NFR BLD AUTO: 18 % (ref 14–44)
MCH RBC QN AUTO: 28.3 PG (ref 26.8–34.3)
MCHC RBC AUTO-ENTMCNC: 33.1 G/DL (ref 31.4–37.4)
MCV RBC AUTO: 86 FL (ref 82–98)
MONOCYTES # BLD AUTO: 0.58 THOUSAND/ΜL (ref 0.17–1.22)
MONOCYTES NFR BLD AUTO: 7 % (ref 4–12)
NEUTROPHILS # BLD AUTO: 5.64 THOUSANDS/ΜL (ref 1.85–7.62)
NEUTS SEG NFR BLD AUTO: 73 % (ref 43–75)
NRBC BLD AUTO-RTO: 0 /100 WBCS
PLATELET # BLD AUTO: 240 THOUSANDS/UL (ref 149–390)
PMV BLD AUTO: 10.6 FL (ref 8.9–12.7)
POTASSIUM SERPL-SCNC: 4.4 MMOL/L (ref 3.5–5.3)
PROT SERPL-MCNC: 7.1 G/DL (ref 6.4–8.2)
RBC # BLD AUTO: 4.34 MILLION/UL (ref 3.81–5.12)
SODIUM SERPL-SCNC: 139 MMOL/L (ref 136–145)
TSH SERPL DL<=0.05 MIU/L-ACNC: 0.97 UIU/ML (ref 0.45–4.5)
WBC # BLD AUTO: 7.79 THOUSAND/UL (ref 4.31–10.16)

## 2022-05-21 PROCEDURE — 36415 COLL VENOUS BLD VENIPUNCTURE: CPT

## 2022-05-21 PROCEDURE — 84702 CHORIONIC GONADOTROPIN TEST: CPT

## 2022-05-21 PROCEDURE — 85025 COMPLETE CBC W/AUTO DIFF WBC: CPT

## 2022-05-21 PROCEDURE — 80053 COMPREHEN METABOLIC PANEL: CPT

## 2022-05-21 PROCEDURE — 84443 ASSAY THYROID STIM HORMONE: CPT

## 2022-05-21 PROCEDURE — 82306 VITAMIN D 25 HYDROXY: CPT

## 2022-05-21 PROCEDURE — 86803 HEPATITIS C AB TEST: CPT

## 2022-06-02 ENCOUNTER — ULTRASOUND (OUTPATIENT)
Dept: OBGYN CLINIC | Facility: CLINIC | Age: 32
End: 2022-06-02

## 2022-06-02 VITALS
HEIGHT: 66 IN | DIASTOLIC BLOOD PRESSURE: 70 MMHG | HEART RATE: 69 BPM | WEIGHT: 144 LBS | BODY MASS INDEX: 23.14 KG/M2 | SYSTOLIC BLOOD PRESSURE: 102 MMHG

## 2022-06-02 DIAGNOSIS — Z3A.01 LESS THAN 8 WEEKS GESTATION OF PREGNANCY: Primary | ICD-10-CM

## 2022-06-02 PROCEDURE — 99213 OFFICE O/P EST LOW 20 MIN: CPT | Performed by: OBSTETRICS & GYNECOLOGY

## 2022-06-02 NOTE — PROGRESS NOTES
Subjective:     Jesus Ambrocio is a 28 y o  [id number] female who presents with amenorrhea and appropriately rising bhcg's  She denies any bleeding or cramping  Objective:    Vitals: Blood pressure 102/70, pulse 69, height 5' 6" (1 676 m), weight 65 3 kg (144 lb), last menstrual period 04/08/2022  Body mass index is 23 24 kg/m²  FIRST TRIMESTER OBSTETRIC ULTRASOUND  6/2/22  Wally Fitzgerald MD     INDICATION: Amenorrhea, viability    COMPARISON: None  TECHNIQUE:   Transvaginal imaging was performed to assess the gestation, myometrial/endometrial architecture and ovarian parenchymal detail  The study includes volumetric sweeps and traditional still imaging technique  FINDINGS:     A single intrauterine gestation is identified  Cardiac activity is detected at 158bpm       YOLK SAC:  Present and normal in size and appearance  MEAN CROWN RUMP LENGTH:  14 9 mm = 7 weeks 6 days   AMNIOTIC FLUID/SAC SHAPE:  Within expected normal range  UTERUS/ADNEXA:   No adnexal mass or pathologic cyst   No free fluid identified  IMPRESSION:    According to , will date based off LMP (4/8/22)  Final SALLY: 1/13/22  Gestational age: 10w11d  Fetal cardiac activity detected  No adnexal masses seen         Assessment/Plan:    7 weeks gestation of pregnancy with cardiac activity  Return to office for prenatal H&P and OB intake  MFM referral and Prenatal panel given  Will need early 1hr hitesh Fitzgerald MD  6/2/2022  1:29 PM

## 2022-06-03 ENCOUNTER — APPOINTMENT (OUTPATIENT)
Dept: LAB | Facility: HOSPITAL | Age: 32
End: 2022-06-03
Payer: COMMERCIAL

## 2022-06-03 DIAGNOSIS — Z3A.01 LESS THAN 8 WEEKS GESTATION OF PREGNANCY: ICD-10-CM

## 2022-06-03 LAB
ABO GROUP BLD: NORMAL
BASOPHILS # BLD AUTO: 0.06 THOUSANDS/ΜL (ref 0–0.1)
BASOPHILS NFR BLD AUTO: 1 % (ref 0–1)
BILIRUB UR QL STRIP: NEGATIVE
BLD GP AB SCN SERPL QL: NEGATIVE
CLARITY UR: CLEAR
COLOR UR: COLORLESS
EOSINOPHIL # BLD AUTO: 0.12 THOUSAND/ΜL (ref 0–0.61)
EOSINOPHIL NFR BLD AUTO: 2 % (ref 0–6)
ERYTHROCYTE [DISTWIDTH] IN BLOOD BY AUTOMATED COUNT: 13.7 % (ref 11.6–15.1)
GLUCOSE UR STRIP-MCNC: NEGATIVE MG/DL
HBV SURFACE AG SER QL: NORMAL
HCT VFR BLD AUTO: 38 % (ref 34.8–46.1)
HGB BLD-MCNC: 12.6 G/DL (ref 11.5–15.4)
HGB UR QL STRIP.AUTO: NEGATIVE
IMM GRANULOCYTES # BLD AUTO: 0.04 THOUSAND/UL (ref 0–0.2)
IMM GRANULOCYTES NFR BLD AUTO: 1 % (ref 0–2)
KETONES UR STRIP-MCNC: NEGATIVE MG/DL
LEUKOCYTE ESTERASE UR QL STRIP: NEGATIVE
LYMPHOCYTES # BLD AUTO: 1.42 THOUSANDS/ΜL (ref 0.6–4.47)
LYMPHOCYTES NFR BLD AUTO: 18 % (ref 14–44)
MCH RBC QN AUTO: 28.3 PG (ref 26.8–34.3)
MCHC RBC AUTO-ENTMCNC: 33.2 G/DL (ref 31.4–37.4)
MCV RBC AUTO: 85 FL (ref 82–98)
MONOCYTES # BLD AUTO: 0.45 THOUSAND/ΜL (ref 0.17–1.22)
MONOCYTES NFR BLD AUTO: 6 % (ref 4–12)
NEUTROPHILS # BLD AUTO: 5.88 THOUSANDS/ΜL (ref 1.85–7.62)
NEUTS SEG NFR BLD AUTO: 72 % (ref 43–75)
NITRITE UR QL STRIP: NEGATIVE
NRBC BLD AUTO-RTO: 0 /100 WBCS
PH UR STRIP.AUTO: 6.5 [PH]
PLATELET # BLD AUTO: 239 THOUSANDS/UL (ref 149–390)
PMV BLD AUTO: 11.1 FL (ref 8.9–12.7)
PROT UR STRIP-MCNC: NEGATIVE MG/DL
RBC # BLD AUTO: 4.46 MILLION/UL (ref 3.81–5.12)
RH BLD: POSITIVE
RUBV IGG SERPL IA-ACNC: 24.6 IU/ML
SP GR UR STRIP.AUTO: 1 (ref 1–1.03)
SPECIMEN EXPIRATION DATE: NORMAL
UROBILINOGEN UR STRIP-ACNC: <2 MG/DL
WBC # BLD AUTO: 7.97 THOUSAND/UL (ref 4.31–10.16)

## 2022-06-03 PROCEDURE — 87077 CULTURE AEROBIC IDENTIFY: CPT

## 2022-06-03 PROCEDURE — 87186 SC STD MICRODIL/AGAR DIL: CPT

## 2022-06-03 PROCEDURE — 80081 OBSTETRIC PANEL INC HIV TSTG: CPT

## 2022-06-03 PROCEDURE — 36415 COLL VENOUS BLD VENIPUNCTURE: CPT

## 2022-06-03 PROCEDURE — 87086 URINE CULTURE/COLONY COUNT: CPT

## 2022-06-03 PROCEDURE — 81003 URINALYSIS AUTO W/O SCOPE: CPT

## 2022-06-05 LAB
BACTERIA UR CULT: ABNORMAL
HIV 1+2 AB+HIV1 P24 AG SERPL QL IA: NORMAL

## 2022-06-06 LAB — RPR SER QL: NORMAL

## 2022-06-08 ENCOUNTER — TELEPHONE (OUTPATIENT)
Dept: LABOR AND DELIVERY | Facility: HOSPITAL | Age: 32
End: 2022-06-08

## 2022-06-08 ENCOUNTER — INITIAL PRENATAL (OUTPATIENT)
Dept: OBGYN CLINIC | Facility: CLINIC | Age: 32
End: 2022-06-08

## 2022-06-08 VITALS — HEIGHT: 66 IN | WEIGHT: 144 LBS | BODY MASS INDEX: 23.14 KG/M2

## 2022-06-08 DIAGNOSIS — N39.0 UTI (URINARY TRACT INFECTION): Primary | ICD-10-CM

## 2022-06-08 DIAGNOSIS — Z34.91 PRENATAL CARE IN FIRST TRIMESTER: Primary | ICD-10-CM

## 2022-06-08 PROCEDURE — T1001 NURSING ASSESSMENT/EVALUATN: HCPCS

## 2022-06-08 RX ORDER — NITROFURANTOIN 25; 75 MG/1; MG/1
100 CAPSULE ORAL 2 TIMES DAILY
Qty: 10 CAPSULE | Refills: 0 | Status: SHIPPED | OUTPATIENT
Start: 2022-06-08 | End: 2022-06-13

## 2022-06-08 NOTE — LETTER
Proof of Pregnancy Letter    Maia Mary  1990  [address] [address]        06/06/22      Angelina MONROY Darianamarsha Ruby is a patient at our facility  Angelina ELIANA Darianamarsha Flori Estimated Date of Delivery: 1/13/23       Any questions or concerns, please feel free to contact our office  Sincerely,    1 [facility]    [address], [facility]

## 2022-06-08 NOTE — LETTER
Work Letter    Angelina Apontecelestino  1990  [address] [address]    Dear Hank Darnell,      06/06/22        Your employee is a patient at [place] [address]  We recommend that all pregnant women:    1  Have a well-ventilated workspace  2  Wear low-heeled shoes  3  Work no more than 40 hours per week  4  Have a 15 minute break every 2 hours and at least 30 minutes for a meal break  5  Use good body mechanics by bending at your knees to avoid back strain and lift no more than 20 pounds without assistance  Will need assistance with lifting over 20 lbs  6  Have ready access to bathrooms and water  She may continue to work until her due date unless medical complications arise  We anticipate she may return to work in 6-8 weeks after delivery       Sincerely,  1 Domingo Delaney

## 2022-06-08 NOTE — PROGRESS NOTES
OB INTAKE INTERVIEW  Pt presents for OB intake  [id number]  OB History    Para Term  AB Living   5 2 2   2 2   SAB IAB Ectopic Multiple Live Births     2     2      # Outcome Date GA Lbr Jose/2nd Weight Sex Delivery Anes PTL Lv   5 Current            4 IAB            3 IAB            2 Term            1 Term              Hx of  delivery prior to 36 weeks 6 days:  No   If yes, place a referral for cervical surveillance at 16 weeks  Last Menstrual Period:    Patient's last menstrual period was 2022 (exact date)  Ultrasound date: 2022  7 weeks 6 days     Estimated Date of Delivery: 23   by LMP  H&P visit scheduled  2022 @ 0800      Last pap smear: unknown date, "Sometime like a year or two ago"  Findings; lab pap smear results: no abnormalities per pt    Current Issues:  Constipation :   No  Headaches :   No  Cramping:  No  Spotting :   No  PICA cravings :  No  FOB Involved: No  Planned pregnancy:  Yes  I have these concerns about this prenatal patient:   THC use in pregnancy  Pt reports having no local support system (moved from [place])    Interview education   [facility] Pregnancy Essentials reviewed and discussed    Baby and [address] Handout   [facility] Handout   Discussed genetic testing   Prenatal lab work: Scripts printed and given to pt   Influenza vaccine given today: No   Discussed Tdap vaccine  Immunizations: There is no immunization history on file for this patient  Depression Screening Follow-up Plan: Patient's depression screening was negative with an [place] score of  1  Clinically patient does not have depression  No treatment is required     Nurse/Family Partnership- referral placed:  No   If yes, place referral for nurse family partnership  BMI Counseling:   Tobacco Cessation Counseling: former quit in 2019  Infection Screening: Does the pt have a hx of MRSA?  No  If yes- please follow MRSA protocol and obtain a nasal swab for MRSA culture  The patient was oriented to our practice and all questions were answered    Interviewed by: Pilar Nunez RN 06/08/22

## 2022-06-08 NOTE — LETTER
[facility] Letter    Angelina Almaz Argentina  1990  [address] [address]       06/06/22          Angelina Gagnonmini Curtis is a patient and under our care in our office  Angelina MONROY Jemal's Estimated Date of Delivery: 1/13/23  Any questions or concerns feel free to contact our office       Thank you,  [address]  [id number] [facility] Rob[facility] [address]  [address] [facility] [address] [place] [address]/[address]  [phone]

## 2022-06-08 NOTE — TELEPHONE ENCOUNTER
Call patient to discuss results of urine culture from prenatal panel  She had greater than 100,000 E coli  Patient states on phone call that she has been experiencing some dysuria  Will plan to send Macrobid to her pharmacy  All questions were answered to patient's satisfaction      Glo Ortiz MD  06/08/22

## 2022-06-08 NOTE — LETTER
Dentist Letter    Ricky aGrg  1990  [address] [address]          06/06/22    We have had several requests from local dentist requesting permission to perform procedures on our patients who are pregnant  We wish to respond with this letter regarding some of the more routine procedures that we have been asked about  The following procedures may be performed on our obstetric patients:   1  Administration of local anesthesia   2  Administration of antibiotics such as PCN, Ampicillin, and Erythromycin  3  Administration of pain medications such as Tylenol, Tylenol with codeine, and if needed Percocet  4  Shielded X-rays    Should you have any questions, please do not hesitate to contact at [phone]          Sincerely,    1 [facility]   [phone]

## 2022-06-10 ENCOUNTER — PATIENT OUTREACH (OUTPATIENT)
Dept: OBGYN CLINIC | Facility: CLINIC | Age: 32
End: 2022-06-10
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
## 2022-06-15 ENCOUNTER — HOSPITAL ENCOUNTER (EMERGENCY)
Facility: HOSPITAL | Age: 32
Discharge: HOME | End: 2022-06-15
Attending: EMERGENCY MEDICINE | Admitting: EMERGENCY MEDICINE
Payer: COMMERCIAL

## 2022-06-15 VITALS
RESPIRATION RATE: 16 BRPM | DIASTOLIC BLOOD PRESSURE: 75 MMHG | HEART RATE: 83 BPM | SYSTOLIC BLOOD PRESSURE: 107 MMHG | WEIGHT: 144 LBS | OXYGEN SATURATION: 100 % | TEMPERATURE: 98 F | BODY MASS INDEX: 23.24 KG/M2

## 2022-06-15 DIAGNOSIS — R51.9 HEADACHE: Primary | ICD-10-CM

## 2022-06-15 PROCEDURE — 99284 EMERGENCY DEPT VISIT MOD MDM: CPT

## 2022-06-15 PROCEDURE — 99284 EMERGENCY DEPT VISIT MOD MDM: CPT | Performed by: EMERGENCY MEDICINE

## 2022-06-15 RX ORDER — DIPHENHYDRAMINE HCL 25 MG
25 TABLET ORAL ONCE
Status: COMPLETED | OUTPATIENT
Start: 2022-06-15 | End: 2022-06-15

## 2022-06-15 RX ORDER — METOCLOPRAMIDE 10 MG/1
10 TABLET ORAL ONCE
Status: COMPLETED | OUTPATIENT
Start: 2022-06-15 | End: 2022-06-15

## 2022-06-15 RX ADMIN — DIPHENHYDRAMINE HCL 25 MG: 25 TABLET, COATED ORAL at 10:30

## 2022-06-15 RX ADMIN — METOCLOPRAMIDE 10 MG: 10 TABLET ORAL at 10:30

## 2022-06-15 NOTE — ED PROVIDER NOTES
History  Chief Complaint   Patient presents with    Abdominal Pain     Pt complains of abdominal pressure and headaches  Pt is 9 5 weeks pregnant  40-year-old female  was 9 weeks pregnant and no past medical history who presents with abdominal pain and a headache  Patient describes her headache as a occipital nonradiating throbbing sensation that feels similar to prior headaches and is exacerbated by light  She has tried taking Tylenol with minimal to no relief of her symptoms  Patient describes her suprapubic abdominal pain as a cramping sensation that also does not radiate and is been going on for the past several days  Since being here in the emergency department the pain has resolved on its own and no longer bothering her  She reports having this pain before in the past and being evaluated in this emergency department several months ago  She otherwise denies any N/V/D/C/F, chest pain, vision changes, numbness, tingling, arm/leg pain or swelling  History provided by:  Patient  Abdominal Pain  Pain location:  Suprapubic  Pain quality: cramping    Pain radiates to:  Does not radiate  Pain severity:  No pain  Onset quality:  Sudden  Timing:  Intermittent  Progression:  Resolved  Chronicity:  Recurrent  Relieved by:  Nothing  Worsened by:   Movement  Ineffective treatments:  Acetaminophen  Associated symptoms: nausea    Associated symptoms: no chest pain, no chills, no cough, no dysuria, no fever, no hematuria, no shortness of breath, no sore throat, no vaginal bleeding, no vaginal discharge and no vomiting    Headache  Pain location:  Occipital  Quality:  Dull  Radiates to:  Does not radiate  Severity currently:  4/10  Onset quality:  Gradual  Timing:  Intermittent  Progression:  Waxing and waning  Chronicity:  Recurrent  Similar to prior headaches: yes    Relieved by:  Nothing  Worsened by:  Light  Associated symptoms: abdominal pain and nausea    Associated symptoms: no back pain, no cough, no ear pain, no eye pain, no fever, no seizures, no sore throat and no vomiting        Prior to Admission Medications   Prescriptions Last Dose Informant Patient Reported? Taking? Prenatal Vit-Fe Fumarate-FA (PRENATAL VITAMINS PO)  Self Yes No   Sig: Take by mouth   Pyridoxine HCl (vitamin B-6) 25 MG tablet   No No   Sig: Take 1 tablet (25 mg total) by mouth in the morning  doxylamine (UNISOM) 25 MG tablet   No No   Sig: Take 0 5 tablets (12 5 mg total) by mouth daily at bedtime as needed for sleep   ondansetron (ZOFRAN-ODT) 4 mg disintegrating tablet  Self No No   Sig: Take 1 tablet (4 mg total) by mouth every 6 (six) hours as needed for nausea or vomiting   Patient not taking: Reported on 2022      Facility-Administered Medications: None       History reviewed  No pertinent past medical history  Past Surgical History:   Procedure Laterality Date    ABDOMINAL SURGERY       SECTION      COMBINED ABDOMINOPLASTY AND LIPOSUCTION      COSMETIC SURGERY      Pt states " [facility]"       Family History   Problem Relation Age of Onset    Ovarian cysts Mother     No Known Problems Father     No Known Problems Sister     No Known Problems Sister     No Known Problems Brother     No Known Problems Brother     No Known Problems Brother     No Known Problems Brother     No Known Problems Son     No Known Problems Son      I have reviewed and agree with the history as documented      E-Cigarette/Vaping    E-Cigarette Use Never User      E-Cigarette/Vaping Substances    Nicotine No     THC No     CBD No     Flavoring No     Other No      Social History     Tobacco Use    Smoking status: Former Smoker     Types: Cigarettes     Quit date:      Years since quitting: 3 4    Smokeless tobacco: Never Used    Tobacco comment: Former smoker - As per [email or web address] Vaping Use: Never used   Substance Use Topics    Alcohol use: Never    Drug use: Yes     Types: Marijuana Review of Systems   Constitutional: Negative for chills and fever  HENT: Negative for ear pain and sore throat  Eyes: Negative for pain and visual disturbance  Respiratory: Negative for cough and shortness of breath  Cardiovascular: Negative for chest pain and palpitations  Gastrointestinal: Positive for abdominal pain and nausea  Negative for vomiting  Genitourinary: Negative for dysuria, hematuria, vaginal bleeding and vaginal discharge  Musculoskeletal: Negative for arthralgias and back pain  Skin: Negative for color change and rash  Neurological: Positive for headaches  Negative for seizures and syncope  All other systems reviewed and are negative  Physical Exam  Physical Exam  Vitals and nursing note reviewed  Constitutional:       General: She is not in acute distress  Appearance: She is well-developed  HENT:      Head: Normocephalic and atraumatic  Eyes:      Conjunctiva/sclera: Conjunctivae normal    Cardiovascular:      Rate and Rhythm: Normal rate and regular rhythm  Heart sounds: No murmur heard  Pulmonary:      Effort: Pulmonary effort is normal  No respiratory distress  Breath sounds: Normal breath sounds  Abdominal:      Palpations: Abdomen is soft  Tenderness: There is abdominal tenderness in the suprapubic area  Musculoskeletal:      Cervical back: Neck supple  Skin:     General: Skin is warm and dry  Neurological:      Mental Status: She is alert and oriented to person, place, and time  Cranial Nerves: Cranial nerves are intact  No cranial nerve deficit or facial asymmetry  Sensory: Sensation is intact  Motor: No weakness, tremor or pronator drift        Coordination: Coordination normal  Finger-Nose-Finger Test and Heel to [facility] Test normal          Vital Signs  ED Triage Vitals   Temperature Pulse Respirations Blood Pressure SpO2   06/15/22 1103 06/15/22 0824 06/15/22 0824 06/15/22 0824 06/15/22 0824   98 °F (36 7 °C) 78 16 128/85 98 %      Temp Source Heart Rate Source Patient Position - Orthostatic VS BP Location FiO2 (%)   06/15/22 1103 06/15/22 1103 06/15/22 0824 06/15/22 0824 --   Oral Monitor Lying Right arm       Pain Score       06/15/22 0824       9           Vitals:    06/15/22 0824 06/15/22 1103   BP: 128/85 107/75   Pulse: 78 83   Patient Position - Orthostatic VS: Lying Lying         Visual Acuity      ED Medications  Medications   metoclopramide (REGLAN) tablet 10 mg (10 mg Oral Given 6/15/22 1030)   diphenhydrAMINE (BENADRYL) tablet 25 mg (25 mg Oral Given 6/15/22 1030)       Diagnostic Studies  Results Reviewed     None                 No orders to display              Procedures  POC Pelvic US    Date/Time: 6/15/2022 12:42 PM  Performed by: Faizan Arellano DO  Authorized by: Faizan Arellano DO     Patient location:  ED  Other Assisting Provider: Yes (comment) (EM Resident)    Procedure details:     Indications: pregnant with abdominal pain      Assessment for: evaluate fetal viability      Technique:  Transabdominal obstetric (HCG+) exam    Views obtained: uterus (transverse and sagittal)      Image quality: diagnostic      Image availability:  Not saved  Uterine findings:     Intrauterine pregnancy: identified      Single gestation: identified      Gestational sac: identified      Yolk sac: identified      Fetal pole: identified      Fetal heart rate: identified    Interpretation:     Ultrasound impressions: normal      Pregnancy findings: intrauterine pregnancy (IUP)               ED Course  ED Course as of 06/15/22 1238   Wed Zacarias 15, 2022   1236 Patient re-evaluated and reports significant improvement of her headache  Her abdominal pain has also significantly improved  She is requesting discharge and agrees to close outpatient follow-up and strict return precautions      Had a long and thorough discussion with the patient regarding the specific signs and symptoms that warrant return to the emergency department  Reiterated the importance of outpatient follow-up with strict return precautions  Patient verbalized understanding of these discharge instructions as demonstrated by the teach back method  They are in agreement with this current treatment plan of discharge, with all questions answered  SBIRT 22yo+    Flowsheet Row Most Recent Value   SBIRT (25 yo +)    In order to provide better care to our patients, we are screening all of our patients for alcohol and drug use  Would it be okay to ask you these screening questions? Yes Filed at: 06/15/2022 1122   Initial Alcohol Screen: US AUDIT-C     1  How often do you have a drink containing alcohol? 0 Filed at: 06/15/2022 1122   2  How many drinks containing alcohol do you have on a typical day you are drinking? 0 Filed at: 06/15/2022 1122   3a  Male UNDER 65: How often do you have five or more drinks on one occasion? 0 Filed at: 06/15/2022 1122   3b  FEMALE Any Age, or MALE 65+: How often do you have 4 or more drinks on one occassion? 0 Filed at: 06/15/2022 1122   Audit-C Score 0 Filed at: 06/15/2022 1122   DERIC: How many times in the past year have you    Used an illegal drug or used a prescription medication for non-medical reasons? Never Filed at: 06/15/2022 1122                    MDM  Number of Diagnoses or Management Options  Headache  Diagnosis management comments: Presentation, symptoms and overall clinical picture suggestive of a tension headache versus migraine  Will attempt to pain control with Reglan and Benadryl  Abdominal pain may be secondary to musculoskeletal pain/strain/sprain versus expected from IUP  Plan to perform bedside POCUS to evaluate fetal heart rate  No clinical indication for lab work at this time however low threshold to obtain basic labs  Likely plan to discharge with outpatient follow-up and strict return precautions    Patient is aware of and in agreement with this current treatment plan, all questions answered  Amount and/or Complexity of Data Reviewed  Tests in the radiology section of CPT®: ordered and reviewed  Tests in the medicine section of CPT®: ordered and reviewed    Risk of Complications, Morbidity, and/or Mortality  Presenting problems: high  Diagnostic procedures: moderate  Management options: moderate        Disposition  Final diagnoses:   Headache     Time reflects when diagnosis was documented in both MDM as applicable and the Disposition within this note     Time User Action Codes Description Comment    6/15/2022 12:37 PM Luis Sanchez Add [R51 9] Headache       ED Disposition     ED Disposition   Discharge    Condition   Stable    Date/Time   Wed Zacarias 15, 2022 [address] discharge to home/self care  Follow-up Information    None         Patient's Medications   Discharge Prescriptions    No medications on file       No discharge procedures on file      PDMP Review     None          ED Provider  Electronically Signed by           Hayley Adames DO  06/15/22 6499

## 2022-06-15 NOTE — Clinical Note
Shira Corbin was seen and treated in our emergency department on 6/15/2022  No restrictions            Diagnosis:     Angelina  may return to work on return date  She may return on this date: 06/17/2022    Patient was seen in the Emergency Department by Dr Sulaiman Clark for treatment of her headache and abdominal pain  If you have any questions or concerns, please don't hesitate to call        Hetal Aguilera RN    ______________________________           _______________          _______________  Hospital Representative                              Date                                Time

## 2022-06-23 ENCOUNTER — ROUTINE PRENATAL (OUTPATIENT)
Dept: OBGYN CLINIC | Facility: CLINIC | Age: 32
End: 2022-06-23

## 2022-06-23 VITALS
DIASTOLIC BLOOD PRESSURE: 67 MMHG | SYSTOLIC BLOOD PRESSURE: 101 MMHG | BODY MASS INDEX: 22.82 KG/M2 | HEART RATE: 93 BPM | WEIGHT: 142 LBS | HEIGHT: 66 IN

## 2022-06-23 DIAGNOSIS — Z30.2 ENCOUNTER FOR STERILIZATION: ICD-10-CM

## 2022-06-23 DIAGNOSIS — Z34.91 PRENATAL CARE IN FIRST TRIMESTER: Primary | ICD-10-CM

## 2022-06-23 DIAGNOSIS — Z3A.10 10 WEEKS GESTATION OF PREGNANCY: ICD-10-CM

## 2022-06-23 DIAGNOSIS — O23.41 URINARY TRACT INFECTION IN MOTHER DURING FIRST TRIMESTER OF PREGNANCY: ICD-10-CM

## 2022-06-23 DIAGNOSIS — Z34.91 ENCOUNTER FOR SUPERVISION OF LOW-RISK PREGNANCY IN FIRST TRIMESTER: ICD-10-CM

## 2022-06-23 DIAGNOSIS — Z87.898 MARIJUANA USE IN REMISSION: ICD-10-CM

## 2022-06-23 DIAGNOSIS — Z98.891 HISTORY OF CESAREAN DELIVERY: ICD-10-CM

## 2022-06-23 DIAGNOSIS — Z11.3 SCREENING FOR STDS (SEXUALLY TRANSMITTED DISEASES): ICD-10-CM

## 2022-06-23 DIAGNOSIS — Z72.51 HIGH RISK HETEROSEXUAL BEHAVIOR: ICD-10-CM

## 2022-06-23 PROBLEM — F12.91 MARIJUANA USE IN REMISSION: Status: ACTIVE | Noted: 2022-06-23

## 2022-06-23 PROBLEM — E55.9 VITAMIN D DEFICIENCY: Status: RESOLVED | Noted: 2020-10-26 | Resolved: 2022-06-23

## 2022-06-23 PROBLEM — O20.0 THREATENED ABORTION: Status: RESOLVED | Noted: 2022-05-19 | Resolved: 2022-06-23

## 2022-06-23 PROBLEM — O23.40 UTI (URINARY TRACT INFECTION) DURING PREGNANCY: Status: ACTIVE | Noted: 2022-06-23

## 2022-06-23 PROCEDURE — G0145 SCR C/V CYTO,THINLAYER,RESCR: HCPCS | Performed by: OBSTETRICS & GYNECOLOGY

## 2022-06-23 PROCEDURE — 99213 OFFICE O/P EST LOW 20 MIN: CPT | Performed by: STUDENT IN AN ORGANIZED HEALTH CARE EDUCATION/TRAINING PROGRAM

## 2022-06-23 PROCEDURE — G0476 HPV COMBO ASSAY CA SCREEN: HCPCS | Performed by: OBSTETRICS & GYNECOLOGY

## 2022-06-23 PROCEDURE — 87591 N.GONORRHOEAE DNA AMP PROB: CPT | Performed by: OBSTETRICS & GYNECOLOGY

## 2022-06-23 PROCEDURE — 87491 CHLMYD TRACH DNA AMP PROBE: CPT | Performed by: OBSTETRICS & GYNECOLOGY

## 2022-06-23 NOTE — PROGRESS NOTES
OB/GYN  PRENATAL H&P VISIT  Jesusita Siddiqui  2022  8:08 AM  Dr Brian Rain MD      SUBJECTIVE  Patient is a [id number] at [address] here for initial prenatal H&P  This is a long interval pregnancy; she has a 15 yo and 17 yo child  This is a new FOB  She is currently doing well  She works from home  She denies hx of STD/STI, denies a hx of TB or close contacts with persons with TB  She denies a family history of inheritable conditions such as physical or intellectual disabilities, birth defects, blood disorders, heart or neural tube defects  She never had MRSA  She denies recent travel or travel planned in the near future  She denies use of nicotine or alcohol  She reports marijuana use prior to pregnancy, but stopped once she became pregnant  She denies vaginal bleeding, cramping, leakage, abnormal discharge  She finished her antibiotic course for her UTI, and has no urinary symptoms  She does report some diarrhea today, which she thinks is from something she ate  OB History    Para Term  AB Living   5 2 2 0 2 2   SAB IAB Ectopic Multiple Live Births   0 2 0 0 2      # Outcome Date GA Lbr Jose/2nd Weight Sex Delivery Anes PTL Lv   5 Current            4 IAB            3 IAB            2 Term            1 Term                Review of Systems   Constitutional: Negative for chills and fever  HENT: Negative for ear pain and sore throat  Eyes: Negative for pain and visual disturbance  Respiratory: Negative for cough and shortness of breath  Cardiovascular: Negative for chest pain and palpitations  Gastrointestinal: Positive for diarrhea  Negative for abdominal pain and vomiting  Genitourinary: Negative for dysuria and hematuria  Musculoskeletal: Negative for arthralgias and back pain  Skin: Negative for color change and rash  Neurological: Negative for seizures and syncope  All other systems reviewed and are negative  No past medical history on file      Past Surgical History:   Procedure Laterality Date    ABDOMINAL SURGERY       SECTION      COMBINED ABDOMINOPLASTY AND LIPOSUCTION      COSMETIC SURGERY      Pt states " [facility]"       Social History     Socioeconomic History    Marital status: /Civil Union     Spouse name: Not on file    Number of children: Not on file    Years of education: Not on file    Highest education level: Not on file   Occupational History    Occupation: CNA   Tobacco Use    Smoking status: Former Smoker     Types: Cigarettes     Quit date:      Years since quitting: 3 4    Smokeless tobacco: Never Used    Tobacco comment: Former smoker - As per [employer] Vaping Use: Never used   Substance and Sexual Activity    Alcohol use: Never    Drug use: Yes     Types: Marijuana    Sexual activity: Yes     Partners: Male     Birth control/protection: None   Other Topics Concern    Not on file   Social History Narrative    · Most recent tobacco use screenin2019      · Do you currently or have you served in [employer]:   No      · Alcohol intake:   None      · Caffeine intake:   None      · Guns present in home:   No      · Seat belts used routinely:   Yes  sometimes     · Sunscreen used routinely:   No      · Smoke alarm in home: Yes      · Advance directive:   No     As per [place]     Social Determinants of Health     Financial Resource Strain: Medium Risk    Difficulty of Paying Living Expenses: Somewhat hard   Food Insecurity: Food Insecurity Present    Worried About Running Out of Food in the Last Year: Sometimes true    Margarita of Food in the Last Year: Sometimes true   Transportation Needs: No Transportation Needs    Lack of Transportation (Medical): No    Lack of Transportation (Non-Medical):  No   Physical Activity: Insufficiently Active    Days of Exercise per Week: 7 days    Minutes of Exercise per Session: 10 min   Stress: No Stress Concern Present    Feeling of Stress : Not at all   Social Connections: Socially Isolated    Frequency of Communication with Friends and Family: More than three times a week    Frequency of Social Gatherings with Friends and Family: Never    Attends Denominational Services: Never    Active Member of Clubs or Organizations: No    Attends Club or Organization Meetings: Never    Marital Status: Never    Intimate Partner Violence: Not At Risk    Fear of Current or Ex-Partner: No    Emotionally Abused: No    Physically Abused: No    Sexually Abused: No   Housing Stability: [address] Unable to Pay for Housing in the Last Year: No    Number of [place] in the Last Year: 1    Unstable Housing in the Last Year: No       OBJECTIVE  Vitals:    06/23/22 0700   BP: 101/67   Pulse: 93     Physical Exam  Constitutional:       Appearance: Normal appearance  Genitourinary:      Vulva normal       Genitourinary Comments: Cervix appears normal      No vaginal discharge  HENT:      Head: Normocephalic and atraumatic  Cardiovascular:      Rate and Rhythm: Normal rate  Pulmonary:      Effort: Pulmonary effort is normal  No respiratory distress  Chest:      Comments: Breasts and nipples normal bilaterally; nipple piercing on left side; no masses or tenderness  Abdominal:      Palpations: Abdomen is soft  Tenderness: There is no abdominal tenderness  Comments: Pfannenstiel incision and two small lateral scars from liposuction   Musculoskeletal:         General: Normal range of motion  Neurological:      Mental Status: She is alert  Skin:     General: Skin is warm and dry  ASSESSMENT AND PLAN    28 y o , [id number], with /67   Pulse 93   Ht 5' 6" (1 676 m)   Wt 64 4 kg (142 lb)   LMP 04/08/2022 (Exact Date) , at [address] here for her prenatal H&P   bpm by ultrasound    1  Pregnancy: H&P completed today  PN Labs reviewed today    Pregnancy expectations discussed with patient, including appointment schedule, nutrition, exercise, medications, sexual intercourse, and nausea/vomiting  Patient's BMI is 22  Recommended weight gain is 25-35 labs  2  Screening: Pap smear collected  GC/CT collected  [facility]   to discuss first trimester screening  3  Consents: Delivery process including potential OVD and  reviewed  Sign delivery consent form at 28 weeks  4  Will schedule repeat  section and bilateral tubal ligation  5  Postpartum: Patient plans on formula feeding  Different methods of contraception were discussed with patient, including progesterone only oral pills, depo provera, nexplanon, mirena, and paragard  Patient would like to use tubal sterilization at the time of her repeat  section  6  Follow up: RTC in 4 weeks  Precautions regarding labor, leakage, bleeding, and fetal movement reviewed      Elkin Roach MD  2022  8:08 AM

## 2022-06-24 LAB
HPV HR 12 DNA CVX QL NAA+PROBE: NEGATIVE
HPV16 DNA CVX QL NAA+PROBE: NEGATIVE
HPV18 DNA CVX QL NAA+PROBE: NEGATIVE

## 2022-06-25 LAB
C TRACH DNA SPEC QL NAA+PROBE: NEGATIVE
N GONORRHOEA DNA SPEC QL NAA+PROBE: NEGATIVE

## 2022-06-28 ENCOUNTER — TELEPHONE (OUTPATIENT)
Dept: OBGYN CLINIC | Facility: CLINIC | Age: 32
End: 2022-06-28

## 2022-06-28 LAB
LAB AP GYN PRIMARY INTERPRETATION: NORMAL
Lab: NORMAL

## 2022-06-28 NOTE — TELEPHONE ENCOUNTER
----- Message from Guerita Morales MD sent at 6/27/2022  5:28 PM EDT -----  Please inform patient of negative gonorrhea and chlamydia screen; HPV is also negative; awaiting results of cytology from Pap smear

## 2022-06-30 ENCOUNTER — TELEPHONE (OUTPATIENT)
Dept: OBGYN CLINIC | Facility: CLINIC | Age: 32
End: 2022-06-30

## 2022-06-30 NOTE — TELEPHONE ENCOUNTER
----- Message from Kirill Bhatti MD sent at 6/28/2022  4:04 PM EDT -----  Please inform patient of normal pap smear

## 2022-07-07 ENCOUNTER — TELEPHONE (OUTPATIENT)
Dept: PERINATAL CARE | Facility: CLINIC | Age: 32
End: 2022-07-07

## 2022-07-07 ENCOUNTER — ROUTINE PRENATAL (OUTPATIENT)
Dept: PERINATAL CARE | Facility: CLINIC | Age: 32
End: 2022-07-07
Payer: COMMERCIAL

## 2022-07-07 VITALS
HEIGHT: 66 IN | BODY MASS INDEX: 23.82 KG/M2 | WEIGHT: 148.2 LBS | DIASTOLIC BLOOD PRESSURE: 66 MMHG | HEART RATE: 73 BPM | SYSTOLIC BLOOD PRESSURE: 110 MMHG

## 2022-07-07 DIAGNOSIS — Z3A.01 LESS THAN 8 WEEKS GESTATION OF PREGNANCY: ICD-10-CM

## 2022-07-07 DIAGNOSIS — Z98.891 HISTORY OF CESAREAN DELIVERY: Primary | ICD-10-CM

## 2022-07-07 DIAGNOSIS — Z3A.12 12 WEEKS GESTATION OF PREGNANCY: ICD-10-CM

## 2022-07-07 DIAGNOSIS — Z36.82 ENCOUNTER FOR (NT) NUCHAL TRANSLUCENCY SCAN: ICD-10-CM

## 2022-07-07 PROCEDURE — 76813 OB US NUCHAL MEAS 1 GEST: CPT | Performed by: OBSTETRICS & GYNECOLOGY

## 2022-07-07 PROCEDURE — 36415 COLL VENOUS BLD VENIPUNCTURE: CPT | Performed by: OBSTETRICS & GYNECOLOGY

## 2022-07-07 PROCEDURE — 99241 PR OFFICE CONSULTATION NEW/ESTAB PATIENT 15 MIN: CPT | Performed by: OBSTETRICS & GYNECOLOGY

## 2022-07-07 PROCEDURE — 76801 OB US < 14 WKS SINGLE FETUS: CPT | Performed by: OBSTETRICS & GYNECOLOGY

## 2022-07-07 NOTE — PROGRESS NOTES
Patient chose to have [facility] Non-invasive Prenatal Screen  Patient given brochure and is aware [facility] will contact patients insurance and coordinate coverage  Patient made aware she will need to respond to text message or e-mail from [employer] within 2 business days or testing will be run through insurance  Patient informed text message will come from area code  "415"  Provided [employer] # [phone] and web site : [email or web address] [email or web address]     2 vials of blood drawn from Right arm Socorro Dawkins RN, patient tolerated blood draw without difficulty  Specimens labeled with patient identifiers (name, date of birth, specimen collection date), order and specimen was verified with patient, packed and sent via [address]  Copy of lab order scanned to Epic media  Maternal Fetal Medicine will have results in approximately 7-10 business days and will call patient or notify via [address]  Patient aware viewing lab result online will reveal fetal sex If ordered  Patient verbalized understanding of all instructions and no questions at this time

## 2022-07-07 NOTE — LETTER
July 7, 2022     Tamy Valdez MD  [address] [address]    Patient: Amanuel Neal   YOB: 1990   Date of Visit: 7/7/2022       Dear Dr Marianna Mendez: Thank you for referring Tuyet Degroot to me for evaluation  Below are my notes for this consultation  If you have questions, please do not hesitate to call me  I look forward to following your patient along with you  Sincerely,        Eagle Menendez MD        CC: No Recipients  Eagle Menendez MD  7/7/2022  1:20 PM  Sign when Signing Visit  [address]: Ms Hector Trujillo was seen today at [address] for nuchal translucency ultrasound  See ultrasound report under "OB Procedures" tab  My recommendations are as follows:  1  We reviewed the availability of genetic screening, as well as diagnostic testing, which are available to all pregnant women  We reviewed limitations, risks, and benefits of screening and testing  She elected to proceed with Non Invasive Prenatal Screening (NIPS)  MSAFP screening should be ordered through your office at 15-20 weeks gestation, and completed prior to fetal anatomic survey  She does not wish to pursue diagnostic testing at this time  A detailed anatomic survey as well as transvaginal cervical length screening are recommended between 18-22 weeks gestation-scheduled 8/26/22      Please don't hesitate to contact our office with any concerns or questions     -Eagle Menendez MD

## 2022-07-07 NOTE — PATIENT INSTRUCTIONS
You elected to have non-invasive screening for genetic syndrome performed for your pregnancy  This involves a blood test to check for the four most common genetic syndromes (Trisomy 21, Trisomy 13, Trisomy 25, and sex chromosome abnormalities)  It also will report the biologic sex of the fetus  Results will be visible in your [employer] portal 7-10 business days from when the test is drawn  Please follow all instructions regarding determining out of pocket cost for the test (as provided by our nursing staff today), as well as follow any instructions regarding need for prior authorization before having the test drawn  Please contact our office with any concerns or questions  You will need spina bifida screening (called MSAFP) for the baby beginning at 15 weeks gestation, which will be ordered by your obstetrician's office  This test allows for earlier detection of spina bifida than is possible by ultrasound, and is advised in all pregnancies

## 2022-07-07 NOTE — PROGRESS NOTES
[address]: Ms Tanisha Delgado was seen today at [address] [facility] [address] for nuchal translucency ultrasound  See ultrasound report under "OB Procedures" tab  My recommendations are as follows:  1  We reviewed the availability of genetic screening, as well as diagnostic testing, which are available to all pregnant women  We reviewed limitations, risks, and benefits of screening and testing  She elected to proceed with Non Invasive Prenatal Screening (NIPS)  MSAFP screening should be ordered through your office at 15-20 weeks gestation, and completed prior to fetal anatomic survey  She does not wish to pursue diagnostic testing at this time  A detailed anatomic survey as well as transvaginal cervical length screening are recommended between 18-22 weeks gestation-scheduled 8/26/22      Please don't hesitate to contact our office with any concerns or questions     -Raysa Resendiz MD

## 2022-07-07 NOTE — TELEPHONE ENCOUNTER
Left patient a message that her MFM appointment had to be rescheduled due to staff out  LEFT MESSAGE THAT WE ARE CANCELLING 10:00 APPT AND WE WILL CALL PT BACK TO RESCHEDULE  The patient has been instructed to please call us back at [phone] with any questions or concerns  ALSO SENT PT MESSAGE IN [place] WITH INFORMATION ABOVE

## 2022-07-21 ENCOUNTER — ROUTINE PRENATAL (OUTPATIENT)
Dept: OBGYN CLINIC | Facility: CLINIC | Age: 32
End: 2022-07-21

## 2022-07-21 VITALS
WEIGHT: 145 LBS | HEIGHT: 66 IN | SYSTOLIC BLOOD PRESSURE: 118 MMHG | HEART RATE: 82 BPM | DIASTOLIC BLOOD PRESSURE: 75 MMHG | BODY MASS INDEX: 23.3 KG/M2

## 2022-07-21 DIAGNOSIS — Z30.2 ENCOUNTER FOR STERILIZATION: ICD-10-CM

## 2022-07-21 DIAGNOSIS — Z3A.14 14 WEEKS GESTATION OF PREGNANCY: Primary | ICD-10-CM

## 2022-07-21 DIAGNOSIS — Z98.891 HISTORY OF CESAREAN DELIVERY: ICD-10-CM

## 2022-07-21 PROCEDURE — 99213 OFFICE O/P EST LOW 20 MIN: CPT | Performed by: OBSTETRICS & GYNECOLOGY

## 2022-07-21 NOTE — PROGRESS NOTES
[address]  [id number]  1990        A/P:  Problem List Items Addressed This Visit        Other    14 weeks gestation of pregnancy - Primary     Continue to take PNV  Planning for tubal sterilization at time of RLTCS  Anatomy US scheduled for   MSAFP ordered to complete genetic screening  RTO in 1 month         Relevant Orders    Alpha fetoprotein, maternal    History of  delivery     History of 2 term CS  Interval liposuction         Encounter for sterilization     Patient desires tubal ligation at the time of her repeat  section; MA-31 needs to be signed                    S: 28 y o  [id number] 14w6d here for PN visit  She denies contractions  She denies leakage of fluid and vaginal bleeding  She has not yet felt fetal movement  O:  Vitals:    22 0900   BP: 118/75   Pulse: 82     Physical Exam  GEN: The patient was alert and oriented x3, pleasant well-appearing female in no acute distress     CV: Regular rate  PULM: nonlabored respirations  MSK: Normal gait  Skin: warm, dry  Neuro: no focal deficits  Psych: normal affect and judgement, cooperative    Fetal Heart Rate: 140       D/w Dr Merline Richters, MD  OB/GYN PGY-2

## 2022-07-24 NOTE — ASSESSMENT & PLAN NOTE
Continue to take PNV  Planning for tubal sterilization at time of RLTCS  Anatomy US scheduled for 8/26  MSAFP ordered to complete genetic screening  RTO in 1 month

## 2022-07-25 ENCOUNTER — ROUTINE PRENATAL (OUTPATIENT)
Dept: OBGYN CLINIC | Facility: CLINIC | Age: 32
End: 2022-07-25

## 2022-07-25 ENCOUNTER — APPOINTMENT (OUTPATIENT)
Dept: LAB | Facility: HOSPITAL | Age: 32
End: 2022-07-25
Payer: COMMERCIAL

## 2022-07-25 VITALS
BODY MASS INDEX: 23.78 KG/M2 | SYSTOLIC BLOOD PRESSURE: 118 MMHG | HEIGHT: 66 IN | WEIGHT: 148 LBS | RESPIRATION RATE: 18 BRPM | DIASTOLIC BLOOD PRESSURE: 84 MMHG | HEART RATE: 81 BPM

## 2022-07-25 DIAGNOSIS — Z01.89 ENCOUNTER FOR URINE TEST: ICD-10-CM

## 2022-07-25 DIAGNOSIS — Z98.891 HISTORY OF CESAREAN DELIVERY: ICD-10-CM

## 2022-07-25 DIAGNOSIS — Z59.41 FOOD INSECURITY: ICD-10-CM

## 2022-07-25 DIAGNOSIS — Z30.2 ENCOUNTER FOR STERILIZATION: ICD-10-CM

## 2022-07-25 DIAGNOSIS — Z3A.14 14 WEEKS GESTATION OF PREGNANCY: Primary | ICD-10-CM

## 2022-07-25 DIAGNOSIS — O23.41 URINARY TRACT INFECTION IN MOTHER DURING FIRST TRIMESTER OF PREGNANCY: ICD-10-CM

## 2022-07-25 LAB
SL AMB  POCT GLUCOSE, UA: NORMAL
SL AMB LEUKOCYTE ESTERASE,UA: NORMAL
SL AMB POCT BILIRUBIN,UA: NORMAL
SL AMB POCT BLOOD,UA: NORMAL
SL AMB POCT CLARITY,UA: CLEAR
SL AMB POCT COLOR,UA: YELLOW
SL AMB POCT KETONES,UA: NORMAL
SL AMB POCT NITRITE,UA: NORMAL
SL AMB POCT PH,UA: 5
SL AMB POCT SPECIFIC GRAVITY,UA: 1
SL AMB POCT URINE PROTEIN: NORMAL
SL AMB POCT UROBILINOGEN: 1

## 2022-07-25 PROCEDURE — 81002 URINALYSIS NONAUTO W/O SCOPE: CPT | Performed by: OBSTETRICS & GYNECOLOGY

## 2022-07-25 PROCEDURE — 82105 ALPHA-FETOPROTEIN SERUM: CPT

## 2022-07-25 PROCEDURE — 36415 COLL VENOUS BLD VENIPUNCTURE: CPT

## 2022-07-25 PROCEDURE — 99212 OFFICE O/P EST SF 10 MIN: CPT | Performed by: OBSTETRICS & GYNECOLOGY

## 2022-07-25 SDOH — ECONOMIC STABILITY - FOOD INSECURITY: FOOD INSECURITY: Z59.41

## 2022-07-25 NOTE — PROGRESS NOTES
Subjective:     Eddie Fisher is a 28 y o  [id number] female at 15w3d who presents for a problem visit with lower abdominal and pelvic discomfort over the last week  Described as sharp pain and increased pelvic pressure, is not worse with movement and is present even when laying down  Denies any fevers, chills, vaginal bleeding, abnormal discharge, LOF, abdominal trauma, dysuria, hematuria, or back pain  She also reports intermittent black spots the last few days  She states that sometimes when she is outside walking, she will notice some gray spots in her vision that quickly resolve  She's had a mild headache that has improved with Tylenol  Denies any syncope  Objective:    Vitals: Blood pressure 118/84, pulse 81, resp  rate 18, height 5' 6" (1 676 m), weight 67 1 kg (148 lb), last menstrual period 04/08/2022  Body mass index is 23 89 kg/m²  Physical Exam  Constitutional:       General: She is not in acute distress  Appearance: She is not ill-appearing or toxic-appearing  HENT:      Head: Normocephalic and atraumatic  Pulmonary:      Effort: Pulmonary effort is normal  No respiratory distress  Abdominal:      Palpations: Abdomen is soft  Tenderness: There is no abdominal tenderness  There is no guarding  Genitourinary:     General: Normal vulva  Vagina: No vaginal discharge  Comments: Closed, normal appearing cervix; no abnormal discharge  Skin:     General: Skin is warm and dry  Neurological:      General: No focal deficit present  Mental Status: She is alert and oriented to person, place, and time  Psychiatric:         Mood and Affect: Mood normal          Thought Content:  Thought content normal          Judgment: Judgment normal         TAUS: 153 BPM      Assessment/Plan:    Pelvic discomfort  - Cervix closed on speculum  - CL 4 06 cm on TVUS  - KOH, wet mount, UA all negative for infection   - Patient reassured, pain consistent with round ligament    Visual changes  - Intermittent gray spots in vision with a headache that has resolvd  - BP today /84  - Recommended staying well-hydrated; call or present to ED if any loss of vision, blurred vision that does not resolve, syncope or near syncopal episodes, severe headache  - RTO 8/18 for routine prenatal visit         Jessie Moreno MD  7/25/2022  5:52 PM

## 2022-07-26 ENCOUNTER — PATIENT OUTREACH (OUTPATIENT)
Dept: OBGYN CLINIC | Facility: CLINIC | Age: 32
End: 2022-07-26
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
## 2022-07-30 LAB
2ND TRIMESTER 4 SCREEN SERPL-IMP: NORMAL
AFP ADJ MOM SERPL: 0.68
AFP INTERP AMN-IMP: NORMAL
AFP INTERP SERPL-IMP: NORMAL
AFP INTERP SERPL-IMP: NORMAL
AFP SERPL-MCNC: 22 NG/ML
AGE AT DELIVERY: 32.8 YR
GA METHOD: NORMAL
GA: 15.4 WEEKS
IDDM PATIENT QL: NO
MULTIPLE PREGNANCY: NO
NEURAL TUBE DEFECT RISK FETUS: NORMAL %

## 2022-08-18 ENCOUNTER — ROUTINE PRENATAL (OUTPATIENT)
Dept: OBGYN CLINIC | Facility: CLINIC | Age: 32
End: 2022-08-18

## 2022-08-18 VITALS
HEIGHT: 66 IN | DIASTOLIC BLOOD PRESSURE: 69 MMHG | HEART RATE: 87 BPM | BODY MASS INDEX: 24.49 KG/M2 | WEIGHT: 152.4 LBS | SYSTOLIC BLOOD PRESSURE: 103 MMHG

## 2022-08-18 DIAGNOSIS — O23.41 URINARY TRACT INFECTION IN MOTHER DURING FIRST TRIMESTER OF PREGNANCY: ICD-10-CM

## 2022-08-18 DIAGNOSIS — Z3A.18 18 WEEKS GESTATION OF PREGNANCY: Primary | ICD-10-CM

## 2022-08-18 DIAGNOSIS — Z30.2 ENCOUNTER FOR STERILIZATION: ICD-10-CM

## 2022-08-18 DIAGNOSIS — O26.892 PREGNANCY HEADACHE IN SECOND TRIMESTER: ICD-10-CM

## 2022-08-18 DIAGNOSIS — R51.9 PREGNANCY HEADACHE IN SECOND TRIMESTER: ICD-10-CM

## 2022-08-18 DIAGNOSIS — Z98.891 HISTORY OF CESAREAN DELIVERY: ICD-10-CM

## 2022-08-18 PROCEDURE — 99213 OFFICE O/P EST LOW 20 MIN: CPT | Performed by: OBSTETRICS & GYNECOLOGY

## 2022-08-18 NOTE — PROGRESS NOTES
[address]   PRENATAL VISIT  Meryl Valadez  [id number]  1990        A/P:  Problem List        Genitourinary    UTI (urinary tract infection) during pregnancy    Overview     E  Coli UTI on prenatal panel; Macrobid prescribed         Current Assessment & Plan     Follow up urine dip unremarkable   Plan for repeat urine culture at next visit            Other    18 weeks gestation of pregnancy    Overview     Planning for tubal sterilization at time of RLTCS  Anatomy US scheduled for   Genetic screening low risk         Current Assessment & Plan     Continue to take PNV  RTO in 1 month         History of  delivery    Overview     History of two prior term  sections  Also has history of an liposuction         Marijuana use in remission    Overview     Used marijuana prior to pregnancy, stopped once pregnant         Encounter for sterilization    Overview     Patient desires tubal ligation at the time of her repeat  section; MA-31 needs to be signed         Pregnancy headache in second trimester    Current Assessment & Plan     Has not resolved, associated vision changes, normal BP  Referral to neurology placed                 S: 28 y o  [id number] 18w6d here for PN visit  She denies contractions  She has no leakage of fluid and vaginal bleeding  She has felt some fetal movement  Headaches with black spots in her vision have continued to occur  Not improved with tylenol  Has not been evaluated for this in the past     O:  Vitals:    22 1000   BP: 103/69   Pulse: 87     Physical Exam   GEN: The patient was alert and oriented x3, pleasant well-appearing female in no acute distress     CV: Regular rate  PULM: Non-labored respirations  MSK: Normal gait  Skin: Warm, dry  Neuro: No focal deficits  Psych: Normal affect and judgement, cooperative      Fetal Heart Rate: 145       D/w Dr Namrata Benitez MD  OB/GYN PGY-2  2022  1:40 PM

## 2022-08-19 PROBLEM — O26.892 PREGNANCY HEADACHE IN SECOND TRIMESTER: Status: ACTIVE | Noted: 2022-08-19

## 2022-08-19 PROBLEM — R51.9 PREGNANCY HEADACHE IN SECOND TRIMESTER: Status: ACTIVE | Noted: 2022-08-19

## 2022-08-24 ENCOUNTER — HOSPITAL ENCOUNTER (OUTPATIENT)
Facility: HOSPITAL | Age: 32
Discharge: HOME | End: 2022-08-24
Attending: OBSTETRICS & GYNECOLOGY | Admitting: OBSTETRICS & GYNECOLOGY
Payer: COMMERCIAL

## 2022-08-24 VITALS
HEART RATE: 80 BPM | HEIGHT: 66 IN | DIASTOLIC BLOOD PRESSURE: 70 MMHG | TEMPERATURE: 98.6 F | WEIGHT: 152 LBS | BODY MASS INDEX: 24.43 KG/M2 | SYSTOLIC BLOOD PRESSURE: 108 MMHG | OXYGEN SATURATION: 100 % | RESPIRATION RATE: 20 BRPM

## 2022-08-24 PROBLEM — Z3A.20 20 WEEKS GESTATION OF PREGNANCY: Status: ACTIVE | Noted: 2022-06-23

## 2022-08-24 PROCEDURE — 76815 OB US LIMITED FETUS(S): CPT

## 2022-08-24 PROCEDURE — NC001 PR NO CHARGE: Performed by: OBSTETRICS & GYNECOLOGY

## 2022-08-24 PROCEDURE — 76817 TRANSVAGINAL US OBSTETRIC: CPT

## 2022-08-24 PROCEDURE — 99214 OFFICE O/P EST MOD 30 MIN: CPT

## 2022-08-25 NOTE — PROCEDURES
Fariha Moreira, a [id number] at 19w5d with an SALLY of 1/13/2023, by Last Menstrual Period, was seen at [address] for the following procedure(s): $Procedure Type: TIMO, US - Transvaginal]                 4 Quadrant TIMO  LVP (cm): 5 cm         Ultrasound Other  Cervical Length: 4 4  Funnel: No  Debris: No  Placenta Location: Anterior         Isaac Mendez DO  08/24/22  11:14 PM

## 2022-08-25 NOTE — PROGRESS NOTES
L&D Triage Note - OB/GYN  Angelina AltamiranoLookMedBook 28 y o  female MRN: [id number]  Unit/Bed#: LD TRIAGE  Encounter: [id number]    ASSESSMENT:    Corwin Fothergill is a 28 y o  [id number] at 19w5d presenting for abdominal pain    PLAN:    1) Abdominal Pain, r/o PTL   - Speculum examination: cervical os appears visually closed, no vaginal bleeding or pooling noted, small amount of thin white vaginal discharge noted  - Nitrazine negative, Slides negative for ferning, clue cells, hyphae, trichomonads on microscopy  - TVUS revelaed cervical length 4 4 cm  - TAUS: LVP 5 cm  SVE: closed/thick/high  - Notes improvement abdominal pain  - Pt cleared for discharge from [facility] triage      Discharge Instructions:   - Continue routine prenatal care  - Discharge from [facility] triage with  labor precautions    - Reviewed rupture of membranes, false vs true labor, decreased fetal movement, and vaginal bleeding   - Pt to call provider with any concerns and follow up at her next scheduled prenatal appointment on  9/15/22at 1000    - Case discussed with Dr Merissa Singleton  SUBJECTIVE:    Corwin Fothergill 28 y o  [id number] at 19w5d with an Estimated Date of Delivery: 23     She presents to triage complaining of 7/10 lower abdominal cramping pain  States her pain began at 0800 while at work  Reports recent intercourse around 0500 the day prior  No complaints of leaking of fluids or vaginal bleeding  Denies contractions    She denies any dysuria, hematuria, vaginal discharge    Her past obstetrical history is significant for 2 prior term C/S     OBJECTIVE:    Vitals:    22   BP: 108/70   Pulse: 80   Resp: 20   Temp: 98 6 °F (37 °C)   SpO2:        ROS:  Constitutional: Negative  Respiratory: Negative  Cardiovascular: Negative    Gastrointestinal: Negative    General Physical Exam:  General: in no apparent distress, well developed and well nourished, alert and afebrile  Cardiovascular: Cor RRR  Lungs: non-labored breathing  Abdomen: mild tenderness to palpation of suprapubic region  No other abdominal tenderness appreciated, no rebound or guarding  Lower extremeties: nontender    Cervical Exam  Speculum: Cervical os appears closed  No visible pooling or vaginal bleeding   Small amount of thin, white  discharge noted  SVE: 0 / 0% / -5    Fetal monitoring:  Fetal Heart Tones 140 bpm  Bodfish: contractions not present     KOH/WTMT:     Infection:   - no clue cells    - no hyphae   - no trichomonads present    Membrane status   - no ferning   - negative Nitrazine   - no pooling     Urine Dip    Urine dipstick shows negative for all components, negative for nitrites, leukocytes, red blood cells, bacteria, glucose, protein, ketones  Imaging:       TVUS   - Cervical length    - 4 66 cm    - 4 4 cm    - 4 46 cm        Abd   US   LVP: 5 07cm    Placenta: Anterior    Washington Cooper DO,  OBGYN PGY-1  8/24/2022 8:27 PM

## 2022-08-26 ENCOUNTER — ROUTINE PRENATAL (OUTPATIENT)
Dept: PERINATAL CARE | Facility: CLINIC | Age: 32
End: 2022-08-26
Payer: COMMERCIAL

## 2022-08-26 VITALS
BODY MASS INDEX: 25.33 KG/M2 | DIASTOLIC BLOOD PRESSURE: 67 MMHG | WEIGHT: 157.6 LBS | SYSTOLIC BLOOD PRESSURE: 112 MMHG | HEART RATE: 87 BPM | HEIGHT: 66 IN

## 2022-08-26 DIAGNOSIS — Z3A.20 20 WEEKS GESTATION OF PREGNANCY: ICD-10-CM

## 2022-08-26 DIAGNOSIS — Z36.3 ENCOUNTER FOR ANTENATAL SCREENING FOR MALFORMATION USING ULTRASOUND: Primary | ICD-10-CM

## 2022-08-26 DIAGNOSIS — Z36.86 ENCOUNTER FOR ANTENATAL SCREENING FOR CERVICAL LENGTH: ICD-10-CM

## 2022-08-26 PROCEDURE — 76805 OB US >/= 14 WKS SNGL FETUS: CPT | Performed by: OBSTETRICS & GYNECOLOGY

## 2022-08-26 PROCEDURE — 76817 TRANSVAGINAL US OBSTETRIC: CPT | Performed by: OBSTETRICS & GYNECOLOGY

## 2022-08-26 PROCEDURE — 99213 OFFICE O/P EST LOW 20 MIN: CPT | Performed by: OBSTETRICS & GYNECOLOGY

## 2022-08-26 NOTE — PROGRESS NOTES
Ultrasound Probe Disinfection    A transvaginal ultrasound was performed  Prior to use, disinfection was performed with High Level Disinfection Process (Trophon)  Probe serial number B3: [id number] was used        Siria Arora  08/26/22  10:50 AM

## 2022-08-26 NOTE — PROGRESS NOTES
The patient was seen today for an ultrasound  Please see ultrasound report (located under Ob Procedures) for additional details  Thank you very much for allowing us to participate in the care of this very nice patient  Should you have any questions, please do not hesitate to contact me  Renan Anderson MD [address]  Attending Physician, Jeanette

## 2022-08-31 ENCOUNTER — TELEPHONE (OUTPATIENT)
Dept: PERINATAL CARE | Facility: CLINIC | Age: 32
End: 2022-08-31

## 2022-08-31 NOTE — TELEPHONE ENCOUNTER
LVM to schedule for available ultrasound on 9/13 in [place]  Told pt to call back if she wants this appt or we can find her another option

## 2022-09-13 ENCOUNTER — ULTRASOUND (OUTPATIENT)
Dept: PERINATAL CARE | Facility: CLINIC | Age: 32
End: 2022-09-13
Payer: COMMERCIAL

## 2022-09-13 VITALS
SYSTOLIC BLOOD PRESSURE: 106 MMHG | HEART RATE: 83 BPM | HEIGHT: 66 IN | WEIGHT: 161.4 LBS | BODY MASS INDEX: 25.94 KG/M2 | DIASTOLIC BLOOD PRESSURE: 66 MMHG

## 2022-09-13 DIAGNOSIS — Z3A.22 22 WEEKS GESTATION OF PREGNANCY: Primary | ICD-10-CM

## 2022-09-13 DIAGNOSIS — Z36.2 ENCOUNTER FOR FOLLOW-UP ULTRASOUND OF FETAL ANATOMY: ICD-10-CM

## 2022-09-13 PROCEDURE — 76815 OB US LIMITED FETUS(S): CPT | Performed by: OBSTETRICS & GYNECOLOGY

## 2022-09-13 NOTE — LETTER
September 13, 2022     Ziyad President, [address] [facility],[address] [facility]  [address], [facility] [address]    Patient: Rosalia Funes   YOB: 1990   Date of Visit: 9/13/2022       Dear Dr Ashok Ferguson: Thank you for referring Bertram Arizmendi to me for evaluation  Below are my notes for this consultation  If you have questions, please do not hesitate to call me  I look forward to following your patient along with you  Sincerely,        Noel Apodaca MD        CC: No Recipients  Noel Apodaca MD  9/13/2022 12:03 PM  Sign when Signing Visit  [address]: Ms Asuncion Khan was seen today at 22w4d for followup missed anatomy ultrasound  See ultrasound report under "OB Procedures" tab    Please don't hesitate to contact our office with any concerns or questions   -Noel Apodaca MD

## 2022-09-13 NOTE — PROGRESS NOTES
[address]: Ms Augusto Rossi was seen today at 22w4d for followup missed anatomy ultrasound  See ultrasound report under "OB Procedures" tab    Please don't hesitate to contact our office with any concerns or questions   -Cass Zuniga MD

## 2022-09-15 ENCOUNTER — ROUTINE PRENATAL (OUTPATIENT)
Dept: OBGYN CLINIC | Facility: CLINIC | Age: 32
End: 2022-09-15

## 2022-09-15 VITALS
SYSTOLIC BLOOD PRESSURE: 125 MMHG | HEIGHT: 66 IN | HEART RATE: 79 BPM | WEIGHT: 162.2 LBS | DIASTOLIC BLOOD PRESSURE: 77 MMHG | BODY MASS INDEX: 26.07 KG/M2

## 2022-09-15 DIAGNOSIS — Z34.92 PRENATAL CARE IN SECOND TRIMESTER: Primary | ICD-10-CM

## 2022-09-15 DIAGNOSIS — Z3A.23 23 WEEKS GESTATION OF PREGNANCY: ICD-10-CM

## 2022-09-15 PROCEDURE — 99213 OFFICE O/P EST LOW 20 MIN: CPT | Performed by: NURSE PRACTITIONER

## 2022-09-15 NOTE — PROGRESS NOTES
Nani Blackmon presents today for routine OB visit at 22w6d  She is a [id number] with a history of two full term c-sections  She is planning for a repeat  with BTL  Blood Pressure: 125/77  Wt=73 6 kg (162 lb 3 2 oz); Body mass index is 26 18 kg/m² ; TWG=8 255 kg (18 lb 3 2 oz)  Fetal Heart Rate: 152; Fundal Height (cm): 23 cm  Abdomen: gravid, soft, non-tender  She reports no complaints of concerns, she is feeling well  Denies uterine contractions or persistent cramping  Denies vaginal bleeding or leaking of fluid  Reports fetal movement  Orders placed for 28 week labs, to have completed in about 4-5 weeks  Reviewed common discomforts of pregnancy in second trimester and warning signs  Advised to continue medications and return in 4 weeks  Current Outpatient Medications   Medication Instructions    doxylamine (UNISOM) 12 5 mg, Oral, Daily at bedtime PRN    ondansetron (ZOFRAN-ODT) 4 mg, Oral, Every 6 hours PRN    Prenatal Vit-Fe Fumarate-FA (PRENATAL VITAMINS PO) Oral    vitamin B-6 25 mg, Oral, Daily     Laboratory workup: initial OB labs (normal); 28 week labs (ordered 9/15/22 to complete around 28wk); 36 week cultures (collect @36w)    Genetic Screening: NIPS low risk  AFP negative  Vaccinations: influenza (will offer during flu season); Tdap (will offer after 27 weeks); COVID-19 (recommended)    Postpartum contraception: desires BTL  Will need MA31 at 28 weeks    Fetal Ultrasounds:  22 22w4d: Anterior placenta  No fetal anomalies are seen on today's survey, and the survey is now complete   No further ultrasounds are recommended at this time      SALLY 23 Problems (from 22 to present)     Problem Noted Resolved    23 weeks gestation of pregnancy 2022 by Juanita Burgos MD No    Overview Addendum 2022  1:38 PM by Bella Rubin MD     Planning for tubal sterilization at time of RLTCS  Anatomy US scheduled for   Genetic screening low risk Previous Version    History of  delivery 2022 by Twyla Barclay MD No    Overview Addendum 2022  8:23 AM by Twyla Barclay MD     History of two prior term  sections  Also has history of an liposuction         Previous Version    UTI (urinary tract infection) during pregnancy 2022 by Twyla Barclay MD No    Overview Signed 2022  8:03 AM by Twyla Barclay MD     E   Coli UTI on prenatal panel; Macrobid prescribed         Encounter for sterilization 2022 by Twyla Barclay MD No    Overview Signed 2022  8:31 AM by Twyla Barclay MD     Patient desires tubal ligation at the time of her repeat  section; MA-31 needs to be signed

## 2022-10-11 PROBLEM — O23.40 UTI (URINARY TRACT INFECTION) DURING PREGNANCY: Status: RESOLVED | Noted: 2022-06-23 | Resolved: 2022-10-11

## 2022-10-12 ENCOUNTER — APPOINTMENT (OUTPATIENT)
Dept: LAB | Facility: CLINIC | Age: 32
End: 2022-10-12
Payer: COMMERCIAL

## 2022-10-12 DIAGNOSIS — Z3A.23 23 WEEKS GESTATION OF PREGNANCY: ICD-10-CM

## 2022-10-12 DIAGNOSIS — Z34.92 PRENATAL CARE IN SECOND TRIMESTER: ICD-10-CM

## 2022-10-12 DIAGNOSIS — O99.012 ANEMIA AFFECTING PREGNANCY IN SECOND TRIMESTER: Primary | ICD-10-CM

## 2022-10-12 PROBLEM — Z3A.26 26 WEEKS GESTATION OF PREGNANCY: Status: ACTIVE | Noted: 2022-06-23

## 2022-10-12 LAB
BASOPHILS # BLD AUTO: 0.05 THOUSANDS/ΜL (ref 0–0.1)
BASOPHILS NFR BLD AUTO: 1 % (ref 0–1)
EOSINOPHIL # BLD AUTO: 0.09 THOUSAND/ΜL (ref 0–0.61)
EOSINOPHIL NFR BLD AUTO: 1 % (ref 0–6)
ERYTHROCYTE [DISTWIDTH] IN BLOOD BY AUTOMATED COUNT: 13.8 % (ref 11.6–15.1)
FERRITIN SERPL-MCNC: 6 NG/ML (ref 8–388)
GLUCOSE 1H P 50 G GLC PO SERPL-MCNC: 94 MG/DL (ref 40–134)
HCT VFR BLD AUTO: 31.5 % (ref 34.8–46.1)
HGB BLD-MCNC: 10 G/DL (ref 11.5–15.4)
IMM GRANULOCYTES # BLD AUTO: 0.18 THOUSAND/UL (ref 0–0.2)
IMM GRANULOCYTES NFR BLD AUTO: 2 % (ref 0–2)
LYMPHOCYTES # BLD AUTO: 1.55 THOUSANDS/ΜL (ref 0.6–4.47)
LYMPHOCYTES NFR BLD AUTO: 15 % (ref 14–44)
MCH RBC QN AUTO: 29.5 PG (ref 26.8–34.3)
MCHC RBC AUTO-ENTMCNC: 31.7 G/DL (ref 31.4–37.4)
MCV RBC AUTO: 93 FL (ref 82–98)
MONOCYTES # BLD AUTO: 0.92 THOUSAND/ΜL (ref 0.17–1.22)
MONOCYTES NFR BLD AUTO: 9 % (ref 4–12)
NEUTROPHILS # BLD AUTO: 7.65 THOUSANDS/ΜL (ref 1.85–7.62)
NEUTS SEG NFR BLD AUTO: 72 % (ref 43–75)
NRBC BLD AUTO-RTO: 0 /100 WBCS
PLATELET # BLD AUTO: 203 THOUSANDS/UL (ref 149–390)
PMV BLD AUTO: 10.5 FL (ref 8.9–12.7)
RBC # BLD AUTO: 3.39 MILLION/UL (ref 3.81–5.12)
RPR SER QL: NORMAL
WBC # BLD AUTO: 10.44 THOUSAND/UL (ref 4.31–10.16)

## 2022-10-12 PROCEDURE — 36415 COLL VENOUS BLD VENIPUNCTURE: CPT

## 2022-10-12 PROCEDURE — 85025 COMPLETE CBC W/AUTO DIFF WBC: CPT

## 2022-10-12 PROCEDURE — 82728 ASSAY OF FERRITIN: CPT

## 2022-10-12 PROCEDURE — 86592 SYPHILIS TEST NON-TREP QUAL: CPT

## 2022-10-12 PROCEDURE — 82950 GLUCOSE TEST: CPT

## 2022-10-12 RX ORDER — FERROUS SULFATE TAB EC 324 MG (65 MG FE EQUIVALENT) 324 (65 FE) MG
324 TABLET DELAYED RESPONSE ORAL DAILY
Qty: 60 TABLET | Refills: 3 | Status: SHIPPED | OUTPATIENT
Start: 2022-10-12 | End: 2022-10-13 | Stop reason: SDUPTHER

## 2022-10-12 NOTE — PROGRESS NOTES
[address]  [id number]  1990        A/P:  Problem List        Other    26 weeks gestation of pregnancy    Overview     Anatomy US wnl, no further US needed  Genetic screening low risk  MSAFP normal  PNL wnl except for urine culture -> E coli UTI treated late July  28 week labs within normal limits except for hemoglobin of 10  Tdap [ ]   Delivery consent [ ]   MA-31 [ ]  Rhogam not indicated   Contraception plan: Planning for tubal BS at time of RLTCS             Current Assessment & Plan     RTO in 2 weeks for 28 week visit  All questions answered to patient satisfaction         History of  delivery    Overview     History of two prior term  sections  Also has history of an liposuction         Marijuana use disorder in remission    Overview     Used marijuana prior to pregnancy, stopped once pregnant         Encounter for sterilization    Overview     Patient desires tubal ligation at the time of her repeat  section; MA-31 needs to be signed         Pregnancy headache in second trimester    Iron deficiency anemia    Overview     10/12: Hgb 10 0   Oral iron ordered as well as Colace/MiraLax         Current Assessment & Plan     Plan to repeat CBC in 1 month           Other Visit Diagnoses     Anemia affecting pregnancy in second trimester                  S: 28 y o  [id number] 26w6d here for PN visit  She denired contractions  She denies leakage of fluid and vaginal bleeding  She has started to feel good fetal movement  O:  Vitals:    10/13/22 1000   BP: 115/73   Pulse: 85     Physical Exam  GEN: The patient was alert and oriented x3, pleasant well-appearing female in no acute distress     CV: Regular rate  PULM: Non-labored respirations  MSK: Normal gait  Skin: Warm, dry  Neuro: No focal deficits  Psych: Normal affect and judgement, cooperative    Fetal Heart Rate: 145       D/w Dr Felix Adams  OB/GYN PGY-2  10/13/2022  10:35 AM

## 2022-10-13 ENCOUNTER — ROUTINE PRENATAL (OUTPATIENT)
Dept: OBGYN CLINIC | Facility: CLINIC | Age: 32
End: 2022-10-13

## 2022-10-13 VITALS
SYSTOLIC BLOOD PRESSURE: 115 MMHG | HEIGHT: 66 IN | HEART RATE: 85 BPM | WEIGHT: 168 LBS | BODY MASS INDEX: 27 KG/M2 | DIASTOLIC BLOOD PRESSURE: 73 MMHG

## 2022-10-13 DIAGNOSIS — Z3A.26 26 WEEKS GESTATION OF PREGNANCY: Primary | ICD-10-CM

## 2022-10-13 DIAGNOSIS — O99.012 ANEMIA AFFECTING PREGNANCY IN SECOND TRIMESTER: ICD-10-CM

## 2022-10-13 DIAGNOSIS — Z98.891 HISTORY OF CESAREAN DELIVERY: ICD-10-CM

## 2022-10-13 DIAGNOSIS — R51.9 PREGNANCY HEADACHE IN SECOND TRIMESTER: ICD-10-CM

## 2022-10-13 DIAGNOSIS — O26.892 PREGNANCY HEADACHE IN SECOND TRIMESTER: ICD-10-CM

## 2022-10-13 DIAGNOSIS — D50.9 IRON DEFICIENCY ANEMIA, UNSPECIFIED IRON DEFICIENCY ANEMIA TYPE: ICD-10-CM

## 2022-10-13 PROCEDURE — 99213 OFFICE O/P EST LOW 20 MIN: CPT | Performed by: OBSTETRICS & GYNECOLOGY

## 2022-10-13 RX ORDER — DOCUSATE SODIUM 100 MG/1
100 CAPSULE, LIQUID FILLED ORAL 2 TIMES DAILY
Qty: 90 CAPSULE | Refills: 0 | Status: SHIPPED | OUTPATIENT
Start: 2022-10-13

## 2022-10-13 RX ORDER — FERROUS SULFATE TAB EC 324 MG (65 MG FE EQUIVALENT) 324 (65 FE) MG
324 TABLET DELAYED RESPONSE ORAL
Qty: 60 TABLET | Refills: 3 | Status: SHIPPED | OUTPATIENT
Start: 2022-10-13

## 2022-10-27 PROBLEM — Z34.93 PRENATAL CARE, THIRD TRIMESTER: Status: ACTIVE | Noted: 2022-10-27

## 2022-10-27 PROBLEM — Z3A.28 28 WEEKS GESTATION OF PREGNANCY: Status: ACTIVE | Noted: 2022-06-23

## 2022-10-27 NOTE — PROGRESS NOTES
[address] [facility]  PRENATAL VISIT    Subjective:  28 y o  [id number] 28w6d who presents for routine prenatal visit  She has no obstetric complaints, including pelvic pain, contractions, vaginal bleeding, loss of fluid, or decreased fetal movement  Regarding contraception, patient no longer desires to proceed with permanent sterilization  Instead, is interested in post placental Mirena IUD for contraception  Objective:  Pre-Stacey Vitals    Flowsheet Row Most Recent Value   Prenatal Assessment    Fetal Heart Rate 135   Fundal Height (cm) 28 cm   Movement Present   Prenatal Vitals    Blood Pressure 111/67   Weight - Scale 78 3 kg (172 lb 9 6 oz)   Urine Albumin/Glucose    Dilation/Effacement/Station    Vaginal Drainage    Edema         TW kg (28 lb 9 6 oz)    Gen: no acute distress, nonlabored breathing  Cardio: RRR, S1/S2 normal   Lungs: CTAB, good effort   Abd: soft, nontender  Gravid uterus     Fundal Height (cm): 28 cm  Fetal Heart Rate: 135    Assessment and Plan:  IUP @ 28w6d     Problem List        Other    29 weeks gestation of pregnancy    Overview     Anatomy US wnl, no further US indicated at this time  Genetic screening low risk  MSAFP normal  PNL wnl except for urine culture -> E coli UTI treated late  week labs within normal limits except for hemoglobin of 10  Tdap 10/28/22  Delivery consent 10/28/22  Rhogam not indicated   Contraception plan:  Patient longer desires permanent sterilization; now desires Postplacental IUD     RTC q2 weeks for routine prenatal care         History of  delivery    Overview     History of two prior term  sections  Also has history of an liposuction  Plan for delivery via repeat  section         Marijuana use disorder in remission    Overview     Used marijuana prior to pregnancy, stopped once pregnant         Encounter for sterilization    Overview     Patient desires tubal ligation at the time of her repeat  section; MA-31 needs to be signed  UDS on admission for delivery         Iron deficiency anemia    Overview     Hgb trend:  Lab Results   Component Value Date/Time    HGB 10 0 (L) 10/12/2022 11:13 AM    HGB 12 6 2022 08:45 AM     Pt on PO iron; given Colace/MiraLax  Repeat CBC in 1 month; ordered          Prenatal care, third trimester      Other Visit Diagnoses     Pregnancy headache in second trimester               Pregnancy: [place]  Fetal sex: Male     Delivery Plans  Planned delivery method:   Planned delivery location: [facility] L&D  Acceptable blood products:  All     Post-Delivery Plans  Feeding intentions: Breast Milk and Non-human milk substitute  Planned birth control: Female Sterilization    Jose Tyson  PGY-4, OBGYN  10/28/2022

## 2022-10-28 ENCOUNTER — ROUTINE PRENATAL (OUTPATIENT)
Dept: OBGYN CLINIC | Facility: CLINIC | Age: 32
End: 2022-10-28

## 2022-10-28 VITALS
BODY MASS INDEX: 27.74 KG/M2 | HEIGHT: 66 IN | WEIGHT: 172.6 LBS | DIASTOLIC BLOOD PRESSURE: 67 MMHG | SYSTOLIC BLOOD PRESSURE: 111 MMHG | HEART RATE: 89 BPM

## 2022-10-28 DIAGNOSIS — R51.9 PREGNANCY HEADACHE IN SECOND TRIMESTER: ICD-10-CM

## 2022-10-28 DIAGNOSIS — Z34.93 PRENATAL CARE, THIRD TRIMESTER: Primary | ICD-10-CM

## 2022-10-28 DIAGNOSIS — O26.892 PREGNANCY HEADACHE IN SECOND TRIMESTER: ICD-10-CM

## 2022-10-28 DIAGNOSIS — F12.91 MARIJUANA USE DISORDER IN REMISSION: ICD-10-CM

## 2022-10-28 DIAGNOSIS — D50.9 IRON DEFICIENCY ANEMIA, UNSPECIFIED IRON DEFICIENCY ANEMIA TYPE: ICD-10-CM

## 2022-10-28 DIAGNOSIS — Z30.2 ENCOUNTER FOR STERILIZATION: ICD-10-CM

## 2022-10-28 DIAGNOSIS — Z98.891 HISTORY OF CESAREAN DELIVERY: ICD-10-CM

## 2022-10-28 PROBLEM — Z3A.29 29 WEEKS GESTATION OF PREGNANCY: Status: ACTIVE | Noted: 2022-06-23

## 2022-10-28 NOTE — PROGRESS NOTES
28 week education packet provided to patient on 10/28/22  Included in packet: Third Trimester paperwork  Delivery consent   Birthing room support person rules and acknowledgment  Birth Plan   Welcome information  Birth certificate worksheet   Consent for Photographers  Perineal/ Vaginal massage   Pediatric practices and locations       Tdap given  Flu declined  Sent home with list of breast pumps to consider   Will notify us which she desires at next visit

## 2022-11-10 ENCOUNTER — APPOINTMENT (OUTPATIENT)
Dept: LAB | Facility: CLINIC | Age: 32
End: 2022-11-10

## 2022-11-10 DIAGNOSIS — D50.9 IRON DEFICIENCY ANEMIA, UNSPECIFIED IRON DEFICIENCY ANEMIA TYPE: ICD-10-CM

## 2022-11-10 LAB
ANISOCYTOSIS BLD QL SMEAR: PRESENT
BASOPHILS # BLD MANUAL: 0.32 THOUSAND/UL (ref 0–0.1)
BASOPHILS NFR MAR MANUAL: 2 % (ref 0–1)
EOSINOPHIL # BLD MANUAL: 0.16 THOUSAND/UL (ref 0–0.4)
EOSINOPHIL NFR BLD MANUAL: 1 % (ref 0–6)
ERYTHROCYTE [DISTWIDTH] IN BLOOD BY AUTOMATED COUNT: 14.3 % (ref 11.6–15.1)
HCT VFR BLD AUTO: 29.7 % (ref 34.8–46.1)
HGB BLD-MCNC: 9.6 G/DL (ref 11.5–15.4)
LYMPHOCYTES # BLD AUTO: 22 % (ref 14–44)
LYMPHOCYTES # BLD AUTO: 3.5 THOUSAND/UL (ref 0.6–4.47)
MCH RBC QN AUTO: 29.2 PG (ref 26.8–34.3)
MCHC RBC AUTO-ENTMCNC: 32.3 G/DL (ref 31.4–37.4)
MCV RBC AUTO: 90 FL (ref 82–98)
MONOCYTES # BLD AUTO: 0.48 THOUSAND/UL (ref 0–1.22)
MONOCYTES NFR BLD: 3 % (ref 4–12)
MYELOCYTES NFR BLD MANUAL: 1 % (ref 0–1)
NEUTROPHILS # BLD MANUAL: 11.28 THOUSAND/UL (ref 1.85–7.62)
NEUTS BAND NFR BLD MANUAL: 1 % (ref 0–8)
NEUTS SEG NFR BLD AUTO: 70 % (ref 43–75)
PLATELET # BLD AUTO: 230 THOUSANDS/UL (ref 149–390)
PLATELET BLD QL SMEAR: ADEQUATE
PMV BLD AUTO: 10.2 FL (ref 8.9–12.7)
RBC # BLD AUTO: 3.29 MILLION/UL (ref 3.81–5.12)
RBC MORPH BLD: PRESENT
WBC # BLD AUTO: 15.89 THOUSAND/UL (ref 4.31–10.16)

## 2022-11-11 ENCOUNTER — ROUTINE PRENATAL (OUTPATIENT)
Dept: OBGYN CLINIC | Facility: CLINIC | Age: 32
End: 2022-11-11

## 2022-11-11 VITALS
WEIGHT: 175 LBS | SYSTOLIC BLOOD PRESSURE: 107 MMHG | BODY MASS INDEX: 28.12 KG/M2 | HEART RATE: 108 BPM | DIASTOLIC BLOOD PRESSURE: 77 MMHG | HEIGHT: 66 IN

## 2022-11-11 DIAGNOSIS — Z3A.31 31 WEEKS GESTATION OF PREGNANCY: Primary | ICD-10-CM

## 2022-11-11 DIAGNOSIS — D50.9 IRON DEFICIENCY ANEMIA, UNSPECIFIED IRON DEFICIENCY ANEMIA TYPE: ICD-10-CM

## 2022-11-11 RX ORDER — FERROUS SULFATE 7.5 MG/0.5
30 SYRINGE (EA) ORAL DAILY
Qty: 50 ML | Refills: 0 | Status: SHIPPED | OUTPATIENT
Start: 2022-11-11

## 2022-11-11 NOTE — PROGRESS NOTES
Gege Gerardo is a 28 y o  female being seen today for her obstetrical visit  She is at 31w0d gestation  Patient reports no complaints  Would like to review blood work Fetal movement: normal  Denies VB, LOF  Labs reviewed, pt previously worked up for iron deficiency anemia  Prescribed oral iron supplement, pt reports she did not tolerate medication due to abdominal pain and constipation  Menstrual History:  OB History        5    Para   2    Term   2            AB   2    Living   2       SAB        IAB   2    Ectopic        Multiple        Live Births   2                  Patient's last menstrual period was 2022 (exact date)  The following portions of the patient's history were reviewed and updated as appropriate: allergies, current medications, past family history, past medical history, past social history, past surgical history and problem list     Review of Systems  Constitutional: negative  Cardiovascular: negative  Gastrointestinal: negative  Genitourinary:negative  Musculoskeletal:negative  Neurological: negative     Objective     /77   Pulse (!) 108   Ht 5' 6" (1 676 m)   Wt 79 4 kg (175 lb)   LMP 2022 (Exact Date)   BMI 28 25 kg/m²   FHT:  136 BPM   Uterine Size: size equals dates   Presentation: unsure        Assessment     Pregnancy 31 and 0/7 weeks     Plan     28-week labs reviewed, abnormal  Will prescribe liquid iron supplement for pt to try , pharmacy confirmed  D/w Dr Mora Walters, does not recommend repeat CBC  Pt still would like to proceed with postpartum IUD insertion  Answered pt's questions to the best of my ability  Follow up in 2 Weeks

## 2022-11-14 ENCOUNTER — HOSPITAL ENCOUNTER (OUTPATIENT)
Facility: HOSPITAL | Age: 32
Discharge: HOME | End: 2022-11-14
Attending: OBSTETRICS & GYNECOLOGY | Admitting: STUDENT IN AN ORGANIZED HEALTH CARE EDUCATION/TRAINING PROGRAM

## 2022-11-14 VITALS
OXYGEN SATURATION: 98 % | RESPIRATION RATE: 15 BRPM | BODY MASS INDEX: 28.12 KG/M2 | HEIGHT: 66 IN | SYSTOLIC BLOOD PRESSURE: 100 MMHG | TEMPERATURE: 97.7 F | HEART RATE: 100 BPM | DIASTOLIC BLOOD PRESSURE: 65 MMHG | WEIGHT: 175 LBS

## 2022-11-14 NOTE — PROCEDURES
Andi Heathbernardino [id number] at [employer] with an SALLY of 1/13/2023, by Last Menstrual Period, was seen at [address] for the following procedure(s): $Procedure Type: US - Transvaginal]                   Ultrasound Other  Cervical Length: 3 64  Funnel: No  Debris: No  Placenta Previa: No  Vasa Previa: No                     Ultrasound Probe Disinfection    A transvaginal ultrasound was performed     Prior to use, disinfection was performed with High Level Disinfection Process (Trophon)  Probe serial number [id number] was used    Sajan Villa MD  11/14/22  11:33 AM

## 2022-11-14 NOTE — PROGRESS NOTES
L&D Triage Note - OB/GYN  Benjie Henderson 28 y o  female MRN: [id number]  Unit/Bed#:  TRIAGE  Encounter: [id number]      ASSESSMENT:    Benjie Henderson is a 28 y o  [id number] at 31w3d here with left-sided abdominal pain since midnight  PLAN:    1) rule out  labor    -slides: neg, pooling neg   -CL: 3 64cm   -SVE: 0/0/-5   -NST reactive, toco quiet    2) Continue routine prenatal care  3) Discharge from [facility] triage with  labor precautions    - Reviewed rupture of membranes, false vs true labor, decreased fetal movement, and vaginal bleeding   - Pt to call provider with any concerns and follow up at her next scheduled prenatal appointment    - Case discussed with Dr Papi Grier and Dr Benije Henderson:    Benjie Henderson 28 y o  [id number] at [address] with an Estimated Date of Delivery: 23 She has been having left-sided abdominal pain since midnight last night  It is worse when she moves  She is also concerned with some increased clear discharge for a few days  Her current obstetrical history is significant for anemia    Her past obstetrical history is significant for 2 prior CS    Contractions: see HPI  Leakage of fluid: see HPI  Vaginal Bleeding: denies  Fetal movement: present    OBJECTIVE:    Vitals:    22 1027   BP: 100/65   Pulse: 100   Resp: 15   Temp:    SpO2:        ROS:  Constitutional: Negative  Respiratory: Negative  Cardiovascular: Negative    Gastrointestinal: Negative    General Physical Exam:  General: in no apparent distress, non-toxic, alert and cooperative  Cardiovascular: Cor RRR  Lungs: non-labored breathing  Abdomen: gravid uterus, tender to palpation in the LUQ and LLQ   Nondistended  Lower extremeties: nontender    Cervical Exam  Speculum: Cervical os appears closed  SVE: 0 / 0% / -5    Fetal monitoring:  FHT:  135 bpm/ Moderate 6 - 25 bpm / 10 x 10 accelerations present, no decelerations  [place]: quiet     KOH/WTMT:     Infection:   - neg clue cells    - neg hyphae   - neg trichomonads present    Membrane status   - neg ferning   - neg pooling     Imaging:       TVUS   - Cervical length    - 3 97cm    - 3 71cm    - 3 64cm    John Jasmine MD  OBGYN PGY-1  11/14/2022 11:34 AM

## 2022-11-23 PROBLEM — Z3A.33 33 WEEKS GESTATION OF PREGNANCY: Status: ACTIVE | Noted: 2022-11-11

## 2022-11-23 NOTE — PROGRESS NOTES
[facility]  [id number]  1990        ASSESSMENT/PLAN:  Problem List        Other    History of  delivery    Overview     History of two prior term  sections  Also has history of an liposuction  Plan for delivery via repeat  section         Marijuana use disorder in remission    Overview     Used marijuana prior to pregnancy, stopped once pregnant         Iron deficiency anemia    Overview     Hgb trend:  Lab Results   Component Value Date/Time    HGB 10 0 (L) 10/12/2022 11:13 AM    HGB 12 6 2022 08:45 AM     Pt on PO iron; given Colace/MiraLax  Repeat CBC in 1 month; ordered          Prenatal care, third trimester    33 weeks gestation of pregnancy    Overview     Overview:  • Labs  o Pap smear and GC/CT completed  o Prenatal panel completed and wnl other than E  Coli UTI- treated  o 28w labs completed  • Vaccines:  o Flu vaccine: []  o Covid vaccine: []  o Tdap vaccine: 10/28/22  • Contraception: post-placental IUD  • Breastfeeding: yes[]  • Birth plan: repeat   • Delivery consent: signed 10/28  • Ultrasounds: Anatomy scan wnl  • Genetic screening low risk, MSAFP normal                Current Assessment & Plan      bpm today  RTO in 2 weeks  If you are experiencing painful contractions, loss of fluids, vaginal bleeding, or decreased fetal movement, please call ask to speak to OBGYN doctor on call prior to proceeding to [facility] 2nd [place] [address] or [facility] 3rd floor [facility]  We have a resident doctor on call at both [facility] 24 hours a day  Subjective: 28 y o  [id number] 32w5d here for prenatal visit  She denies contractions  She denies leakage of fluid and vaginal bleeding  She endorses good fetal movement  She does complain of some left upper quadrant pain that has been present for the past 2 months       Objective:  Pre- Vitals Flowsheet Row Most Recent Value   Prenatal Assessment    Prenatal Vitals    Blood Pressure 108/76   Weight - Scale 81 6 kg (179 lb 12 8 oz)   Urine Albumin/Glucose    Dilation/Effacement/Station    Vaginal Drainage    Edema            Pregnancy Plan:  Pregnancy: Dereck Hammond  Fetal sex: Male     Delivery Plans  Planned delivery method:   Planned delivery location: [facility] L&D  Acceptable blood products: All     Post-Delivery Plans  Feeding intentions: Breast Milk and Non-human milk substitute  Circumcision requested: No  Planned birth control: Female Sterilization      General: Well appearing, no distress  Respiratory: Unlabored breathing  Cardiovascular: Regular rate  Abdomen: Soft, gravid, non tender, small mass palpated in left upper quadrant, felt to be superficial and likely scar tissue from liposcution  Fundal Height: Appropriate for gestational age (35 cm)  FHR: 155 bpm  Extremities: Warm and well perfused  Non tender          D/w Dr Anastasiia Shafer MD  OBGYN PGY-1  2022 11:13 AM

## 2022-11-25 ENCOUNTER — ROUTINE PRENATAL (OUTPATIENT)
Dept: OBGYN CLINIC | Facility: CLINIC | Age: 32
End: 2022-11-25

## 2022-11-25 VITALS
HEIGHT: 66 IN | SYSTOLIC BLOOD PRESSURE: 108 MMHG | DIASTOLIC BLOOD PRESSURE: 76 MMHG | BODY MASS INDEX: 28.9 KG/M2 | HEART RATE: 106 BPM | WEIGHT: 179.8 LBS

## 2022-11-25 DIAGNOSIS — Z3A.33 33 WEEKS GESTATION OF PREGNANCY: Primary | ICD-10-CM

## 2022-11-25 PROBLEM — Z30.2 ENCOUNTER FOR STERILIZATION: Status: RESOLVED | Noted: 2022-06-23 | Resolved: 2022-11-25

## 2022-11-25 PROBLEM — Z3A.29 29 WEEKS GESTATION OF PREGNANCY: Status: RESOLVED | Noted: 2022-06-23 | Resolved: 2022-11-25

## 2022-11-25 NOTE — ASSESSMENT & PLAN NOTE
MICHAEL Botello is a 2 year old female who presents for her well child evaluation.    Concerns raised today include discuss picky eating, seems to prefer milk at times.    In the household no one is ill.    REVIEW OF SYSTEMS see HPI; otherwise denies HEENT, NECK, RESP, CARDIAC, GI, , NEURO or PSYCH Sx.   Appetite: picky  Milk: no longer BF. Getting chocolate milk 3 cups.   Speech/language development: >50 words  Motor development: walking well, climbing  Sleep: waking x 1, wants mom in room      Autism screening:  Does she babble, point or use other gestures?  yes  Does she speak single words? yes  Does she have spontaneous two-word phrases? yes  Has she lost any language or social skills? no    MCHAT questionnaire completed:  Failed items: no  Referred: no         Past Medical History:   Diagnosis Date   • Food allergy-garlic    • Healthy child on routine physical examination        No past surgical history on file.    Family History   Problem Relation Age of Onset   • Patient is unaware of any medical problems Mother    • Patient is unaware of any medical problems Father    • Asthma Sister         outgrew         SOCIAL HISTORY:   Lives with: parents and sibs  Sleep environment: crib  Day Care: yes  Car Seat: rear facing  Dental care: brushing teeth,       PHYSICAL EXAM  GENERAL: MICHAEL Botello is an alert, vigorous female with appropriate behavior. She is in no acute distress.  SKIN: Her skin is warm with normal turgor. The color of the skin is normal. There is no rash. There are no bruises or other signs of injury.  HEAD: The head is atraumatic and normocephalic. The fontanels are closed.  EYES: The eyelids are normal. The conjunctivae appear normal.  The corneal light reflex is symmetrical. There is no fixed deviation of gaze. Red reflexes are normal.  EARS: Exam of the ears reveals the pinnae to be normal. The external auditory canals are clear and the tympanic membranes are normal. Patient responds   bpm today  RTO in 2 weeks  to the spoken word.  NOSE: There is no nasal flaring.  THROAT: The oropharynx is normal. Tonsils are not enlarged.  TEETH: The teeth are normal.  NECK: The neck is normal. The thyroid is not palpably enlarged. There are no enlarged lymph nodes in the neck.  LUNGS: The lung fields are clear to auscultation.  HEART: The precordium is quiet. The heart rhythm is grossly regular. S1 and S2 are normal. The heart tones are strong. There are no murmurs.  ABDOMEN: There is not an umbilical hernia. The abdomen is flat, soft, and not tender. There are no masses. Neither the liver nor the spleen is enlarged. The bowel sounds are normal.  BACK: The back is normal.  GENITALIA: Normal, Tommy stage I, female genitalia. No labial adhesions.  EXTREMITIES: The hip exam is normal. The legs are of equal length. Galeazzi test is normal. The foot exam reveals normal feet. There is no clubbing. There is no edema.  NEUROLOGIC: Normal tone and strength throughout. She is walking normally. Her speech includes several two word phrases.    ASSESSMENT/PLAN:   1. Well 2 year old female child  -Normal growth and development - slow but consistent weight gain  -avoid grazing, hold milk until after a meal discussed. Try to transition to whole white milk from chocolate milk.   -update vaccines as ordered. Parent counseled on the risks and benefits of vaccines and agrees to proceed.   -Return for next well child exam in 6 months.      Anticipatory guidance reviewed and handouts given as needed regarding diet/nutrition, toilet training, temper tantrums, car seat and home safety, dental care, etc.

## 2022-11-28 ENCOUNTER — ROUTINE PRENATAL (OUTPATIENT)
Dept: OBGYN CLINIC | Facility: CLINIC | Age: 32
End: 2022-11-28

## 2022-11-28 VITALS
DIASTOLIC BLOOD PRESSURE: 81 MMHG | HEART RATE: 101 BPM | SYSTOLIC BLOOD PRESSURE: 127 MMHG | HEIGHT: 66 IN | WEIGHT: 176 LBS | BODY MASS INDEX: 28.28 KG/M2

## 2022-11-28 DIAGNOSIS — R30.0 BURNING WITH URINATION: Primary | ICD-10-CM

## 2022-11-28 DIAGNOSIS — Z34.93 PRENATAL CARE IN THIRD TRIMESTER: ICD-10-CM

## 2022-11-28 DIAGNOSIS — D50.9 IRON DEFICIENCY ANEMIA, UNSPECIFIED IRON DEFICIENCY ANEMIA TYPE: ICD-10-CM

## 2022-11-28 LAB
SL AMB  POCT GLUCOSE, UA: NORMAL
SL AMB LEUKOCYTE ESTERASE,UA: NORMAL
SL AMB POCT BILIRUBIN,UA: NORMAL
SL AMB POCT BLOOD,UA: NORMAL
SL AMB POCT CLARITY,UA: CLEAR
SL AMB POCT COLOR,UA: YELLOW
SL AMB POCT KETONES,UA: NORMAL
SL AMB POCT NITRITE,UA: NORMAL
SL AMB POCT PH,UA: 6.5
SL AMB POCT SPECIFIC GRAVITY,UA: 1
SL AMB POCT URINE PROTEIN: NORMAL
SL AMB POCT UROBILINOGEN: 0.2

## 2022-11-28 RX ORDER — SODIUM CHLORIDE 9 MG/ML
20 INJECTION, SOLUTION INTRAVENOUS ONCE
Status: CANCELLED | OUTPATIENT
Start: 2022-12-09

## 2022-12-09 ENCOUNTER — HOSPITAL ENCOUNTER (OUTPATIENT)
Dept: INFUSION CENTER | Facility: HOSPITAL | Age: 32
End: 2022-12-09

## 2022-12-09 VITALS
RESPIRATION RATE: 18 BRPM | SYSTOLIC BLOOD PRESSURE: 114 MMHG | DIASTOLIC BLOOD PRESSURE: 74 MMHG | HEART RATE: 89 BPM | TEMPERATURE: 97 F

## 2022-12-09 DIAGNOSIS — D50.9 IRON DEFICIENCY ANEMIA, UNSPECIFIED IRON DEFICIENCY ANEMIA TYPE: Primary | ICD-10-CM

## 2022-12-09 RX ORDER — SODIUM CHLORIDE 9 MG/ML
20 INJECTION, SOLUTION INTRAVENOUS ONCE
Status: CANCELLED | OUTPATIENT
Start: 2022-12-11

## 2022-12-09 RX ORDER — SODIUM CHLORIDE 9 MG/ML
20 INJECTION, SOLUTION INTRAVENOUS ONCE
Status: COMPLETED | OUTPATIENT
Start: 2022-12-09 | End: 2022-12-09

## 2022-12-09 RX ADMIN — IRON SUCROSE 200 MG: 20 INJECTION, SOLUTION INTRAVENOUS at 08:43

## 2022-12-09 RX ADMIN — SODIUM CHLORIDE 20 ML/HR: 0.9 INJECTION, SOLUTION INTRAVENOUS at 08:43

## 2022-12-09 NOTE — PLAN OF CARE
Problem: SAFETY ADULT  Goal: Patient will remain free of falls  Description: INTERVENTIONS:  - Educate patient/family on patient safety including physical limitations  - Instruct patient to call for assistance with activity   - Consult OT/PT to assist with strengthening/mobility   - Keep Call bell within reach  - Keep bed low and locked with side rails adjusted as appropriate  - Keep care items and personal belongings within reach  - Initiate and maintain comfort rounds  - Make Fall Risk Sign visible to staff  -- Apply yellow socks and bracelet for high fall risk patients  - Consider moving patient to room near nurses station  Outcome: Progressing

## 2022-12-12 ENCOUNTER — ROUTINE PRENATAL (OUTPATIENT)
Dept: OBGYN CLINIC | Facility: CLINIC | Age: 32
End: 2022-12-12

## 2022-12-12 VITALS
HEART RATE: 103 BPM | WEIGHT: 178 LBS | SYSTOLIC BLOOD PRESSURE: 111 MMHG | BODY MASS INDEX: 28.61 KG/M2 | HEIGHT: 66 IN | RESPIRATION RATE: 18 BRPM | DIASTOLIC BLOOD PRESSURE: 76 MMHG

## 2022-12-12 DIAGNOSIS — Z3A.35 35 WEEKS GESTATION OF PREGNANCY: Primary | ICD-10-CM

## 2022-12-12 NOTE — PROGRESS NOTES
OB/GYN  PN Visit  Lucy Banerjee  [id number]  2022  11:15 AM  Dr Juan Shah, DO    S: 28 y o  [id number] 35w3d here for PN visit  She denies contractions, endorses some lower abdominal pain  She denies leakage of fluid and vaginal bleeding  She endorses good fetal movement  Denies chest pain  Endorses some shortness of breath with exertion  Denies lower extremity edema  Her pregnancy is complicated by iron deficiency anemia, for which she is going for venofer infusions  O:  Vitals:    22 1033   BP: 111/76   Pulse: 103   Resp: 18     Physical Exam  Exam conducted with a chaperone present  Constitutional:       General: She is not in acute distress  Appearance: Normal appearance  HENT:      Head: Normocephalic and atraumatic  Cardiovascular:      Rate and Rhythm: Normal rate and regular rhythm  Heart sounds: Normal heart sounds  No murmur heard  Pulmonary:      Effort: No respiratory distress  Breath sounds: Normal breath sounds  No wheezing or rhonchi  Abdominal:      Tenderness: There is no abdominal tenderness  Comments: gravid   Genitourinary:     General: Normal vulva  Labia:         Right: No rash or lesion  Left: No rash or lesion  Musculoskeletal:         General: No swelling  Right lower leg: No edema  Left lower leg: No edema  Skin:     General: Skin is warm and dry  Neurological:      General: No focal deficit present  Mental Status: She is alert and oriented to person, place, and time     Psychiatric:         Mood and Affect: Mood normal          Behavior: Behavior normal        Fundal height: 34cm  FHT: 141    A/P:  1  35w3d GESTATION  - Level 2 U/S and anatomy scan completed;no fetal anomalies seen   - 28 week labs complete; iron deficiency anemia, going for venofer infusions   - GBS swab collected today  - Tdap given 10/28/22; declines influenza vaccination today   -  scheduled for 1300 on 23 (39w0d); informed pt she well get a call the day before with instructions  - Labor precautions reviewed  - Fetal kick counts reviewed  - RTC in 1 weeks    2   Iron deficiency anemia   -CBC on 11/10 showed Hgb of 9 6; ferritin on 10/12 was 6   -pt is going for venofer infusions        Future Appointments   Date Time Provider Hermes Roque   12/13/2022  2:30 PM BE INF BED [facility]   12/15/2022  1:00 PM BE INF CHAIR 5 BE Infusion BE [facility]   12/19/2022 10:30 AM LANCE Maldonado [facility] & NURSING [facility]   12/20/2022  1:30 PM BE INF CHAIR [facility]   12/22/2022  1:30 PM BE INF CHAIR [place] [place]  12/12/2022  11:15 AM

## 2022-12-13 ENCOUNTER — HOSPITAL ENCOUNTER (OUTPATIENT)
Dept: INFUSION CENTER | Facility: HOSPITAL | Age: 32
Discharge: HOME | End: 2022-12-13

## 2022-12-13 VITALS
RESPIRATION RATE: 18 BRPM | SYSTOLIC BLOOD PRESSURE: 104 MMHG | TEMPERATURE: 97.1 F | HEART RATE: 86 BPM | DIASTOLIC BLOOD PRESSURE: 70 MMHG

## 2022-12-13 DIAGNOSIS — D50.9 IRON DEFICIENCY ANEMIA, UNSPECIFIED IRON DEFICIENCY ANEMIA TYPE: Primary | ICD-10-CM

## 2022-12-13 RX ORDER — SODIUM CHLORIDE 9 MG/ML
20 INJECTION, SOLUTION INTRAVENOUS ONCE
Status: CANCELLED | OUTPATIENT
Start: 2022-12-15

## 2022-12-13 RX ORDER — SODIUM CHLORIDE 9 MG/ML
20 INJECTION, SOLUTION INTRAVENOUS ONCE
Status: COMPLETED | OUTPATIENT
Start: 2022-12-13 | End: 2022-12-13

## 2022-12-13 RX ADMIN — IRON SUCROSE 200 MG: 20 INJECTION, SOLUTION INTRAVENOUS at 14:56

## 2022-12-13 RX ADMIN — SODIUM CHLORIDE 20 ML/HR: 0.9 INJECTION, SOLUTION INTRAVENOUS at 14:57

## 2022-12-14 LAB — GP B STREP DNA SPEC QL NAA+PROBE: NEGATIVE

## 2022-12-19 ENCOUNTER — ROUTINE PRENATAL (OUTPATIENT)
Dept: OBGYN CLINIC | Facility: CLINIC | Age: 32
End: 2022-12-19

## 2022-12-19 VITALS
HEIGHT: 66 IN | DIASTOLIC BLOOD PRESSURE: 74 MMHG | WEIGHT: 180 LBS | SYSTOLIC BLOOD PRESSURE: 112 MMHG | BODY MASS INDEX: 28.93 KG/M2 | RESPIRATION RATE: 18 BRPM | HEART RATE: 99 BPM

## 2022-12-19 DIAGNOSIS — D50.9 IRON DEFICIENCY ANEMIA, UNSPECIFIED IRON DEFICIENCY ANEMIA TYPE: Primary | ICD-10-CM

## 2022-12-19 PROBLEM — Z3A.36 36 WEEKS GESTATION OF PREGNANCY: Status: ACTIVE | Noted: 2022-11-11

## 2022-12-19 RX ORDER — SODIUM CHLORIDE 9 MG/ML
20 INJECTION, SOLUTION INTRAVENOUS ONCE
Status: CANCELLED | OUTPATIENT
Start: 2022-12-20

## 2022-12-19 NOTE — PROGRESS NOTES
Assessment & Plan  28 y o  [id number] at 36w3d presenting for routine prenatal visit  Problem List        Other    History of  delivery    Overview     History of two prior term  sections  Also has history of an liposuction  Plan for delivery via repeat  section         Marijuana use disorder in remission    Overview     Used marijuana prior to pregnancy, stopped once pregnant         Iron deficiency anemia    Overview     Hgb trend:  Lab Results   Component Value Date/Time    HGB 10 0 (L) 10/12/2022 11:13 AM    HGB 12 6 2022 08:45 AM     Pt on PO iron; given Colace/MiraLax  Repeat CBC in 1 month; ordered          Prenatal care, third trimester    36 weeks gestation of pregnancy    Overview     Overview:  • Labs  o Pap smear and GC/CT completed  o Prenatal panel completed and wnl other than E  Coli UTI- treated  o 28w labs completed  • Vaccines:  o Flu vaccine: declines  o Covid vaccine: completed  o Tdap vaccine: 10/28/22  • Contraception: post-placental IUD  • Breastfeeding: yes[]  • Birth plan: repeat   • Delivery consent: signed 10/28  • Ultrasounds: Anatomy scan wnl  • Genetic screening low risk, MSAFP normal   • Repeat  section scheduled 23                  ____________________________________________________________  Subjective  She reports some mild pelvic pressure   She denies contractions, loss of fluid, or vaginal bleeding  She feels regular fetal movements  Objective  /74 (BP Location: Left arm, Patient Position: Sitting, Cuff Size: Adult)   Pulse 99   Resp 18   Ht 5' 6" (1 676 m)   Wt 81 6 kg (180 lb)   LMP 2022 (Exact Date)   BMI 29 05 kg/m²   FHR: 130 bpm    SVE closed/thick/high    Physical Exam  Constitutional:       Appearance: Normal appearance  Genitourinary:      Vulva normal       No vaginal discharge  Cardiovascular:      Rate and Rhythm: Normal rate     Pulmonary:      Effort: Pulmonary effort is normal  Abdominal:      Palpations: Abdomen is soft  Tenderness: There is no abdominal tenderness  Musculoskeletal:         General: Normal range of motion  Neurological:      Mental Status: She is alert  Skin:     General: Skin is warm and dry            Patient's Active Problem List  Patient Active Problem List   Diagnosis   • History of  delivery   • Marijuana use disorder in remission   • Iron deficiency anemia   • Prenatal care, third trimester   • 36 weeks gestation of pregnancy           Francisco Francis MD  OB/GYN PGY-4  2022  1:12 PM

## 2022-12-20 ENCOUNTER — HOSPITAL ENCOUNTER (OUTPATIENT)
Dept: INFUSION CENTER | Facility: HOSPITAL | Age: 32
Discharge: HOME | End: 2022-12-20

## 2022-12-20 VITALS
SYSTOLIC BLOOD PRESSURE: 108 MMHG | TEMPERATURE: 97.8 F | HEART RATE: 88 BPM | RESPIRATION RATE: 18 BRPM | DIASTOLIC BLOOD PRESSURE: 67 MMHG

## 2022-12-20 DIAGNOSIS — D50.9 IRON DEFICIENCY ANEMIA, UNSPECIFIED IRON DEFICIENCY ANEMIA TYPE: Primary | ICD-10-CM

## 2022-12-20 RX ORDER — SODIUM CHLORIDE 9 MG/ML
20 INJECTION, SOLUTION INTRAVENOUS ONCE
Status: COMPLETED | OUTPATIENT
Start: 2022-12-20 | End: 2022-12-20

## 2022-12-20 RX ORDER — SODIUM CHLORIDE 9 MG/ML
20 INJECTION, SOLUTION INTRAVENOUS ONCE
Status: CANCELLED | OUTPATIENT
Start: 2022-12-22

## 2022-12-20 RX ADMIN — SODIUM CHLORIDE 20 ML/HR: 0.9 INJECTION, SOLUTION INTRAVENOUS at 13:56

## 2022-12-20 RX ADMIN — IRON SUCROSE 200 MG: 20 INJECTION, SOLUTION INTRAVENOUS at 13:55

## 2022-12-22 ENCOUNTER — HOSPITAL ENCOUNTER (OUTPATIENT)
Dept: INFUSION CENTER | Facility: HOSPITAL | Age: 32
Discharge: HOME | End: 2022-12-22

## 2022-12-22 VITALS
TEMPERATURE: 97.2 F | RESPIRATION RATE: 16 BRPM | HEART RATE: 92 BPM | SYSTOLIC BLOOD PRESSURE: 114 MMHG | DIASTOLIC BLOOD PRESSURE: 76 MMHG

## 2022-12-22 DIAGNOSIS — D50.9 IRON DEFICIENCY ANEMIA, UNSPECIFIED IRON DEFICIENCY ANEMIA TYPE: Primary | ICD-10-CM

## 2022-12-22 RX ORDER — SODIUM CHLORIDE 9 MG/ML
20 INJECTION, SOLUTION INTRAVENOUS ONCE
Status: COMPLETED | OUTPATIENT
Start: 2022-12-22 | End: 2022-12-22

## 2022-12-22 RX ORDER — SODIUM CHLORIDE 9 MG/ML
20 INJECTION, SOLUTION INTRAVENOUS ONCE
Status: CANCELLED | OUTPATIENT
Start: 2022-12-27

## 2022-12-22 RX ADMIN — SODIUM CHLORIDE 20 ML/HR: 0.9 INJECTION, SOLUTION INTRAVENOUS at 14:00

## 2022-12-22 RX ADMIN — IRON SUCROSE 200 MG: 20 INJECTION, SOLUTION INTRAVENOUS at 14:00

## 2022-12-22 NOTE — PROGRESS NOTES
Venofer infusion given without incident  Patient declines printed AVS   Patient to return to department, as scheduled

## 2022-12-25 ENCOUNTER — NURSE TRIAGE (OUTPATIENT)
Dept: OTHER | Facility: OTHER | Age: 32
End: 2022-12-25

## 2022-12-25 ENCOUNTER — HOSPITAL ENCOUNTER (OUTPATIENT)
Facility: HOSPITAL | Age: 32
Discharge: HOME | End: 2022-12-25
Attending: OBSTETRICS & GYNECOLOGY | Admitting: OBSTETRICS & GYNECOLOGY

## 2022-12-25 VITALS
RESPIRATION RATE: 18 BRPM | TEMPERATURE: 98.8 F | SYSTOLIC BLOOD PRESSURE: 118 MMHG | DIASTOLIC BLOOD PRESSURE: 81 MMHG | OXYGEN SATURATION: 97 % | HEART RATE: 105 BPM

## 2022-12-25 DIAGNOSIS — N76.0 BV (BACTERIAL VAGINOSIS): Primary | ICD-10-CM

## 2022-12-25 DIAGNOSIS — B96.89 BV (BACTERIAL VAGINOSIS): Primary | ICD-10-CM

## 2022-12-25 PROBLEM — O36.8190 DECREASED FETAL MOVEMENT: Status: ACTIVE | Noted: 2022-12-25

## 2022-12-25 PROBLEM — Z3A.37 37 WEEKS GESTATION OF PREGNANCY: Status: ACTIVE | Noted: 2022-11-11

## 2022-12-25 RX ORDER — METRONIDAZOLE 500 MG/1
500 TABLET ORAL EVERY 12 HOURS SCHEDULED
Qty: 14 TABLET | Refills: 0 | Status: SHIPPED | OUTPATIENT
Start: 2022-12-25 | End: 2023-01-01

## 2022-12-25 NOTE — TELEPHONE ENCOUNTER
Reason for Disposition  • [1] Pregnant 23 or more weeks AND [2] baby moving less today by kick count  (e g , kick count < 5 in 1 hour or < 10 in 2 hours)    Answer Assessment - Initial Assessment Questions  1  FETAL MOVEMENT: "Has the baby's movement decreased or changed significantly from normal?" (e g , yes, no; describe)      Yes   2  SALLY: "What date are you expecting to deliver?"       1/6/2023   3  PREGNANCY: "How many weeks pregnant are you?"       37 weeks 2 days   4   OTHER SYMPTOMS: "Do you have any other symptoms?" (e g , abdominal pain, leaking fluid from vagina, vaginal bleeding, etc )      Feel swollen in my vaginal area, mucus discharge    Protocols used: PREGNANCY - DECREASED FETAL MOVEMENT-ADULT-

## 2022-12-25 NOTE — PROCEDURES
Ana Gandara, a [id number] at 37w2d with an SALLY of 1/13/2023, by Last Menstrual Period, was seen at [address] for the following procedure(s): $Procedure Type: TIMO]                 4 Quadrant TIMO  TIMO Q1 (cm): 1 8 cm  TIMO Q2 (cm): 1 1 cm  TIMO Q3 (cm): 1 3 cm  TIMO Q4 (cm): 5 7 cm  TIMO TOTAL (cm): 9 9 cm  LVP (cm): 5 7 cm         Ultrasound Other  Fetal Presentation: Vertex  Placenta Location: Anterior           Les Kapoor MD  OBGYN Resident  12/25/22  6:52 PM

## 2022-12-25 NOTE — TELEPHONE ENCOUNTER
Regarding: decreased fetal movement  ----- Message from Rommel Hanna sent at 12/25/2022  4:15 PM EST -----  "asher been experiencing the feeling of swelling down there and i haven felt him move as usual "

## 2022-12-25 NOTE — PROGRESS NOTES
L&D Triage Note - OB/GYN  Angelina Kaur [place] 28 y o  female MRN: [id number]  Unit/Bed#:  TRIAGE 1-01 Encounter: [id number]    Patient is seen by Megan Aase is a 28 y o  [id number] at 37w2d presenting with decreased fetal movement and vaginal itching and irritation with BV and normal fetal assessment    PLAN  #1  Decreased fetal movement:  · NST reactive  · [place] acontractile  · TIMO 9 89 cm  · BPP 10/10  · Patient feeling increased fetal movement while in L&D triage    #2  Bacterial vaginosis:  · Vaginal itching and irritation  · Increased discharge  · >20% per hpf clue cells  · Thin grey discharge  · Flagyl 500 mg BID x7 days ordered    Discharge instructions  · Patient instructed to call if experiencing worsening contractions, vaginal bleeding, loss of fluid or decreased fetal movement  · Will follow up with OBGYN on 12/28/22  D/w Dr Ambrocio Olivera  ______________    SUBJECTIVE    SALLY: Estimated Date of Delivery: 1/13/23    HPI:  28 y o  [id number] 37w2d presents with complaint of increased vaginal discharge, vaginal itching and irritation, and decreased fetal movement  Starting 2 days ago, she notes mild to moderate vaginal irritation and itching and increased volume of discharge that is otherwise normal (no abnormal colors/odors)  She denies recent sexual activity or history of STI and notes the FOB of this baby and her other 2 children are not in the picture currently (which she reports sadness over)  She notes since this morning she's felt less fetal movement (though has felt intermittent fetal movement still throughout the day)  She has not eaten anything since breakfast but did try drinking water  She tried fetal kick counts and says the baby did not move 10 times in 2 hours  Her affect was sad, and so we discussed how she feels mentally and emotionally  She reports being sad about the FOB of this baby and the different FOB of her other 2 children not being in the picture    She denies concern for her safety and reports no physical, emotional, or financial abuse  She denies needing any psychosocial or emotional support this evening and ensures "I'm ok, I'm just sad "    She denies leaking fluid, vaginal bleeding, cramping, contractions, dysuria, hematuria, urgency, frequency, back pain, fever, chills, nausea, or vomiting  Her obstetrical history is significant for iron deficiency anemia, prior CS x2    ROS:  Constitutional: Negative  Respiratory: Negative  Cardiovascular: Negative    Gastrointestinal: Negative    OBJECTIVE:  /81 (BP Location: Left arm)   Pulse 95   Temp 98 8 °F (37 1 °C) (Oral)   Resp 18   LMP 04/08/2022 (Exact Date)   SpO2 98%   There is no height or weight on file to calculate BMI  Physical Exam  Constitutional:       General: She is not in acute distress  Appearance: Normal appearance  She is not ill-appearing  HENT:      Mouth/Throat:      Mouth: Mucous membranes are moist    Eyes:      Extraocular Movements: Extraocular movements intact  Cardiovascular:      Rate and Rhythm: Normal rate  Pulmonary:      Effort: Pulmonary effort is normal    Abdominal:      General: There is no distension  Palpations: Abdomen is soft  Tenderness: There is no abdominal tenderness  There is no guarding  Musculoskeletal:         General: No swelling  Right lower leg: No edema  Left lower leg: No edema  Skin:     General: Skin is warm and dry  Coloration: Skin is not jaundiced  Neurological:      General: No focal deficit present  Mental Status: She is alert  Psychiatric:         Mood and Affect: Mood normal          Behavior: Behavior normal          Thought Content:  Thought content normal          Judgment: Judgment normal          Speculum exam:  Normal external female genitalia  Cervix fully visualized and not visibly dilated  Thin grey discharge in vaginal vault  No bleeding, pooling, abnormal discharge, or lesions noted    Microscopy:     Infection:   - >20% per hpf clue cells    - no hyphae   - no trichomonads present    Membrane status   - no ferning   - neg nitrazene   - no pooling     SVE:  0/0/-4, posterior, firm    FHT:  Baseline: 120 bpm  Variability: moderate  Accelerations: present  Decelerations: absent  NST reactive    TOCO:   Acontractile    IMAGING:     TAUS   TIMO      - Q1 1 77 cm     - Q2 1 14 cm     - Q3 1 28 cm     - Q4 5 70 cm     - Total: 9 89 cm   Placenta: anterior   Presentation: cephalic    Labs: No results found for this or any previous visit (from the past 24 hour(s))        Lois Crow MD  12/25/2022  5:45 PM

## 2022-12-27 ENCOUNTER — HOSPITAL ENCOUNTER (OUTPATIENT)
Dept: INFUSION CENTER | Facility: HOSPITAL | Age: 32
Discharge: HOME | End: 2022-12-27

## 2022-12-27 VITALS
DIASTOLIC BLOOD PRESSURE: 68 MMHG | HEART RATE: 92 BPM | RESPIRATION RATE: 20 BRPM | TEMPERATURE: 98 F | SYSTOLIC BLOOD PRESSURE: 124 MMHG

## 2022-12-27 DIAGNOSIS — D50.9 IRON DEFICIENCY ANEMIA, UNSPECIFIED IRON DEFICIENCY ANEMIA TYPE: Primary | ICD-10-CM

## 2022-12-27 RX ORDER — SODIUM CHLORIDE 9 MG/ML
20 INJECTION, SOLUTION INTRAVENOUS ONCE
Status: COMPLETED | OUTPATIENT
Start: 2022-12-27 | End: 2022-12-27

## 2022-12-27 RX ORDER — SODIUM CHLORIDE 9 MG/ML
20 INJECTION, SOLUTION INTRAVENOUS ONCE
Status: CANCELLED | OUTPATIENT
Start: 2022-12-29

## 2022-12-27 RX ADMIN — IRON SUCROSE 200 MG: 20 INJECTION, SOLUTION INTRAVENOUS at 14:07

## 2022-12-27 RX ADMIN — SODIUM CHLORIDE 20 ML/HR: 0.9 INJECTION, SOLUTION INTRAVENOUS at 14:07

## 2022-12-28 ENCOUNTER — ROUTINE PRENATAL (OUTPATIENT)
Dept: OBGYN CLINIC | Facility: CLINIC | Age: 32
End: 2022-12-28

## 2022-12-28 VITALS
SYSTOLIC BLOOD PRESSURE: 104 MMHG | HEIGHT: 66 IN | RESPIRATION RATE: 18 BRPM | DIASTOLIC BLOOD PRESSURE: 73 MMHG | WEIGHT: 179 LBS | HEART RATE: 109 BPM | BODY MASS INDEX: 28.77 KG/M2

## 2022-12-28 DIAGNOSIS — Z34.93 PRENATAL CARE IN THIRD TRIMESTER: ICD-10-CM

## 2022-12-28 DIAGNOSIS — Z3A.37 37 WEEKS GESTATION OF PREGNANCY: Primary | ICD-10-CM

## 2022-12-28 RX ORDER — ONDANSETRON 4 MG/1
4 TABLET, FILM COATED ORAL EVERY 8 HOURS PRN
Qty: 20 TABLET | Refills: 0 | Status: ON HOLD | OUTPATIENT
Start: 2022-12-28

## 2022-12-28 NOTE — PROGRESS NOTES
Farhad Alegre presents today for routine OB visit at 37w5d  She is a [id number] with a history of two full term c-sections  She is scheduled for repeat  next week  Pregnancy is complicated by anemia, had her last iron infusion yesterday  She reports that for weeks she has had random episodes where she feels like she needs to take multiple deep breaths to catch her breath and then her vision is blurry  She denies any chest pain, edema, RUQ pain or HA  States she has been feeling anxious  Blood Pressure: 104/73  Wt=81 2 kg (179 lb); Body mass index is 28 89 kg/m² ; TWG=15 9 kg (35 lb)  Fetal Heart Rate: 145; Fundal Height (cm): 37 cm  Abdomen: gravid, soft, non-tender  Denies uterine contractions  Denies vaginal bleeding or leaking of fluid  Reports adequate fetal movement of at least 10 movements in 2 hours once daily  Reviewed symptoms are possibly related to anxiety  Blood pressure WNL  Will check preeclampsia lab work to rule this out  Discussed diet and hydration, reports she does not eat much due to nausea and has about 4 glasses of water per day  Symptoms possibly related to low blood sugar or mild dehydration  Will trial Zofran to help with nausea and encouraged to increase PO nutrition with small frequent meals and to increase PO hydration  Warning signs reviewed and 24 hour access to emergency care reviewed  Reviewed labor precautions and fetal kick counts as well as pre-eclampsia warning signs  Reviewed perineal massage for decreasing risk of perineal lacerations during delivery  Advised to continue medications and return in 1 week        Current Outpatient Medications   Medication Instructions   • docusate sodium (COLACE) 100 mg, Oral, 2 times daily   • ferrous sulfate (MISHA-IN-SOL) 75 (15 Fe) mg/mL drops 30 mg of iron, Oral, Daily   • metroNIDAZOLE (FLAGYL) 500 mg, Oral, Every 12 hours scheduled   • ondansetron (ZOFRAN) 4 mg, Oral, Every 8 hours PRN   • Prenatal Vit-Fe Fumarate-FA (PRENATAL VITAMINS PO) Oral     Laboratory workup: initial OB labs (normal); 28 week labs (complete); 36 week cultures (GBS negative)    Genetic Screening: NIPS low risk  AFP negative  Vaccinations: influenza (declined); Tdap (received); COVID-19 (recommended)    Postpartum contraception: IUD    Fetal Ultrasounds:  22 22w4d: Anterior placenta  No fetal anomalies are seen on today's survey, and the survey is now complete  No further ultrasounds are recommended at this time    SALLY 23 Problems (from 22 to present)     Problem Noted Resolved    37 weeks gestation of pregnancy 2022 by Rachel Dasilva DO No    Overview Addendum 2022  1:09 PM by Santosh De La Torre MD     Overview:  • Labs  o Pap smear and GC/CT completed  o Prenatal panel completed and wnl other than E   Coli UTI- treated  o 28w labs completed  • Vaccines:  o Flu vaccine: declines  o Covid vaccine: completed  o Tdap vaccine: 10/28/22  • Contraception: post-placental IUD  • Breastfeeding: yes[]  • Birth plan: repeat   • Delivery consent: signed 10/28  • Ultrasounds: Anatomy scan wnl  • Genetic screening low risk, MSAFP normal   • Repeat  section scheduled 23               Prenatal care, third trimester 10/27/2022 by Kwasi Grove MD No    Iron deficiency anemia 10/13/2022 by Xin Espinoza MD No    Overview Addendum 10/28/2022  2:36 PM by Kwasi Grove MD     Hgb trend:  Lab Results   Component Value Date/Time    HGB 10 0 (L) 10/12/2022 11:13 AM    HGB 12 6 2022 08:45 AM     Pt on PO iron; given Colace/MiraLax  Repeat CBC in 1 month; ordered          History of  delivery 2022 by Santosh De La Torre MD No    Overview Addendum 10/28/2022  2:34 PM by Kwasi Grove MD     History of two prior term  sections  Also has history of an liposuction  Plan for delivery via repeat  section         29 weeks gestation of pregnancy 2022 by Santosh De La Torre MD 2022 by Danish Sheldon MD    Overview Addendum 10/28/2022  2:37 PM by Rafael Peña MD     Anatomy US wnl, no further US indicated at this time  Genetic screening low risk  MSAFP normal  PNL wnl except for urine culture -> E coli UTI treated late  week labs within normal limits except for hemoglobin of 10  Tdap 10/28/22  Delivery consent 10/28/22  Rhogam not indicated   Contraception plan:  Patient longer desires permanent sterilization; now desires Postplacental IUD     RTC q2 weeks for routine prenatal care         UTI (urinary tract infection) during pregnancy 2022 by Nabil Urbina MD 10/11/2022 by [facility]    Overview Signed 2022  8:03 AM by Nabil Urbina MD     E   Coli UTI on prenatal panel; Macrobid prescribed         Encounter for sterilization 2022 by Nabil Urbina MD 2022 by Danish Sheldon MD    Overview Addendum 10/28/2022  2:34 PM by Rafael Peña MD     Patient desires tubal ligation at the time of her repeat  section; MA-31 needs to be signed  UDS on admission for delivery

## 2022-12-29 ENCOUNTER — APPOINTMENT (OUTPATIENT)
Dept: LAB | Facility: CLINIC | Age: 32
End: 2022-12-29

## 2022-12-29 DIAGNOSIS — Z34.93 PRENATAL CARE IN THIRD TRIMESTER: ICD-10-CM

## 2022-12-29 DIAGNOSIS — Z3A.37 37 WEEKS GESTATION OF PREGNANCY: ICD-10-CM

## 2022-12-29 LAB
ALBUMIN SERPL BCP-MCNC: 2.8 G/DL (ref 3.5–5)
ALP SERPL-CCNC: 160 U/L (ref 46–116)
ALT SERPL W P-5'-P-CCNC: 15 U/L (ref 12–78)
ANION GAP SERPL CALCULATED.3IONS-SCNC: 5 MMOL/L (ref 4–13)
ANISOCYTOSIS BLD QL SMEAR: PRESENT
AST SERPL W P-5'-P-CCNC: 13 U/L (ref 5–45)
BASOPHILS # BLD MANUAL: 0.14 THOUSAND/UL (ref 0–0.1)
BASOPHILS NFR MAR MANUAL: 1 % (ref 0–1)
BILIRUB SERPL-MCNC: 0.48 MG/DL (ref 0.2–1)
BUN SERPL-MCNC: 8 MG/DL (ref 5–25)
CALCIUM ALBUM COR SERPL-MCNC: 10.3 MG/DL (ref 8.3–10.1)
CALCIUM SERPL-MCNC: 9.3 MG/DL (ref 8.3–10.1)
CHLORIDE SERPL-SCNC: 108 MMOL/L (ref 96–108)
CO2 SERPL-SCNC: 25 MMOL/L (ref 21–32)
CREAT SERPL-MCNC: 0.69 MG/DL (ref 0.6–1.3)
CREAT UR-MCNC: 26.5 MG/DL
EOSINOPHIL # BLD MANUAL: 0.55 THOUSAND/UL (ref 0–0.4)
EOSINOPHIL NFR BLD MANUAL: 4 % (ref 0–6)
ERYTHROCYTE [DISTWIDTH] IN BLOOD BY AUTOMATED COUNT: 17.7 % (ref 11.6–15.1)
GFR SERPL CREATININE-BSD FRML MDRD: 115 ML/MIN/1.73SQ M
GIANT PLATELETS BLD QL SMEAR: PRESENT
GLUCOSE SERPL-MCNC: 94 MG/DL (ref 65–140)
HCT VFR BLD AUTO: 33.6 % (ref 34.8–46.1)
HGB BLD-MCNC: 10.5 G/DL (ref 11.5–15.4)
LYMPHOCYTES # BLD AUTO: 2.89 THOUSAND/UL (ref 0.6–4.47)
LYMPHOCYTES # BLD AUTO: 21 % (ref 14–44)
MCH RBC QN AUTO: 29.2 PG (ref 26.8–34.3)
MCHC RBC AUTO-ENTMCNC: 31.3 G/DL (ref 31.4–37.4)
MCV RBC AUTO: 94 FL (ref 82–98)
METAMYELOCYTES NFR BLD MANUAL: 1 % (ref 0–1)
MONOCYTES # BLD AUTO: 0.41 THOUSAND/UL (ref 0–1.22)
MONOCYTES NFR BLD: 3 % (ref 4–12)
NEUTROPHILS # BLD MANUAL: 9.49 THOUSAND/UL (ref 1.85–7.62)
NEUTS BAND NFR BLD MANUAL: 3 % (ref 0–8)
NEUTS SEG NFR BLD AUTO: 66 % (ref 43–75)
PLATELET # BLD AUTO: 274 THOUSANDS/UL (ref 149–390)
PLATELET BLD QL SMEAR: ADEQUATE
PLATELET CLUMP BLD QL SMEAR: PRESENT
PMV BLD AUTO: 11.3 FL (ref 8.9–12.7)
POLYCHROMASIA BLD QL SMEAR: PRESENT
POTASSIUM SERPL-SCNC: 4.5 MMOL/L (ref 3.5–5.3)
PROT SERPL-MCNC: 6.4 G/DL (ref 6.4–8.4)
PROT UR-MCNC: 9 MG/DL
PROT/CREAT UR: 0.34 MG/G{CREAT} (ref 0–0.1)
RBC # BLD AUTO: 3.59 MILLION/UL (ref 3.81–5.12)
RBC MORPH BLD: PRESENT
SMUDGE CELLS BLD QL SMEAR: PRESENT
SODIUM SERPL-SCNC: 138 MMOL/L (ref 135–147)
VARIANT LYMPHS # BLD AUTO: 1 %
WBC # BLD AUTO: 13.75 THOUSAND/UL (ref 4.31–10.16)

## 2022-12-30 ENCOUNTER — HOSPITAL ENCOUNTER (OUTPATIENT)
Facility: HOSPITAL | Age: 32
Discharge: HOME | End: 2022-12-30
Attending: OBSTETRICS & GYNECOLOGY | Admitting: STUDENT IN AN ORGANIZED HEALTH CARE EDUCATION/TRAINING PROGRAM

## 2022-12-30 ENCOUNTER — HOSPITAL ENCOUNTER (EMERGENCY)
Facility: HOSPITAL | Age: 32
Discharge: STILL ADMITTED OR ADMINISTRATIVE | End: 2022-12-30

## 2022-12-30 ENCOUNTER — NURSE TRIAGE (OUTPATIENT)
Dept: OTHER | Facility: OTHER | Age: 32
End: 2022-12-30

## 2022-12-30 VITALS
TEMPERATURE: 98.1 F | WEIGHT: 179 LBS | HEIGHT: 66 IN | SYSTOLIC BLOOD PRESSURE: 100 MMHG | OXYGEN SATURATION: 96 % | BODY MASS INDEX: 28.77 KG/M2 | DIASTOLIC BLOOD PRESSURE: 60 MMHG | RESPIRATION RATE: 18 BRPM | HEART RATE: 96 BPM

## 2022-12-30 LAB
ALBUMIN SERPL BCP-MCNC: 3.5 G/DL (ref 3.5–5)
ALP SERPL-CCNC: 154 U/L (ref 34–104)
ALT SERPL W P-5'-P-CCNC: 10 U/L (ref 7–52)
ANION GAP SERPL CALCULATED.3IONS-SCNC: 12 MMOL/L (ref 4–13)
AST SERPL W P-5'-P-CCNC: 16 U/L (ref 13–39)
BILIRUB SERPL-MCNC: 0.78 MG/DL (ref 0.2–1)
BUN SERPL-MCNC: 7 MG/DL (ref 5–25)
CALCIUM SERPL-MCNC: 8.7 MG/DL (ref 8.4–10.2)
CHLORIDE SERPL-SCNC: 106 MMOL/L (ref 96–108)
CO2 SERPL-SCNC: 21 MMOL/L (ref 21–32)
CREAT SERPL-MCNC: 0.65 MG/DL (ref 0.6–1.3)
ERYTHROCYTE [DISTWIDTH] IN BLOOD BY AUTOMATED COUNT: 17.9 % (ref 11.6–15.1)
GFR SERPL CREATININE-BSD FRML MDRD: 117 ML/MIN/1.73SQ M
GLUCOSE SERPL-MCNC: 114 MG/DL (ref 65–140)
HCT VFR BLD AUTO: 31.8 % (ref 34.8–46.1)
HGB BLD-MCNC: 10.6 G/DL (ref 11.5–15.4)
MCH RBC QN AUTO: 29.8 PG (ref 26.8–34.3)
MCHC RBC AUTO-ENTMCNC: 33.3 G/DL (ref 31.4–37.4)
MCV RBC AUTO: 89 FL (ref 82–98)
PLATELET # BLD AUTO: 262 THOUSANDS/UL (ref 149–390)
PMV BLD AUTO: 10.8 FL (ref 8.9–12.7)
POTASSIUM SERPL-SCNC: 3.3 MMOL/L (ref 3.5–5.3)
PROT SERPL-MCNC: 6.1 G/DL (ref 6.4–8.4)
RBC # BLD AUTO: 3.56 MILLION/UL (ref 3.81–5.12)
SODIUM SERPL-SCNC: 139 MMOL/L (ref 135–147)
WBC # BLD AUTO: 13.51 THOUSAND/UL (ref 4.31–10.16)

## 2022-12-30 RX ORDER — PANTOPRAZOLE SODIUM 40 MG/10ML
40 INJECTION, POWDER, LYOPHILIZED, FOR SOLUTION INTRAVENOUS ONCE
Status: COMPLETED | OUTPATIENT
Start: 2022-12-30 | End: 2022-12-30

## 2022-12-30 RX ORDER — FAMOTIDINE 20 MG/1
20 TABLET, FILM COATED ORAL 2 TIMES DAILY
Status: DISCONTINUED | OUTPATIENT
Start: 2022-12-30 | End: 2022-12-30

## 2022-12-30 RX ORDER — PANTOPRAZOLE SODIUM 40 MG/1
40 TABLET, DELAYED RELEASE ORAL
Status: DISCONTINUED | OUTPATIENT
Start: 2022-12-31 | End: 2022-12-30

## 2022-12-30 RX ADMIN — Medication 400 MG: at 23:00

## 2022-12-30 RX ADMIN — PANTOPRAZOLE SODIUM 40 MG: 40 INJECTION, POWDER, FOR SOLUTION INTRAVENOUS at 22:59

## 2022-12-30 NOTE — TELEPHONE ENCOUNTER
Reason for Disposition  • [1] Pregnant 20 or more weeks AND [2] new hand or face swelling    Answer Assessment - Initial Assessment Questions  1  DESCRIPTION: "What is the vision loss like? Describe it for me " (e g , complete vision loss, blurred vision, double vision, floaters, etc )      Black spots in vision for awhile now but now dots of lights in vision for the past week    2  LOCATION: "One or both eyes?" If one, ask: "Which eye?"      Both     3  PATTERN: "Does this come and go, or has it been constant since it started?"      Intermittent but happens often    4  PAIN: "Is there any pain in your eye(s)?"  (Scale 1-10; or mild, moderate, severe)      Denies     5  CONTACTS-GLASSES: "Do you wear contacts or glasses?"      Denies     6  CAUSE: "What do you think is causing this visual problem?"      Unknown    7  OTHER SYMPTOMS: "Do you have any other symptoms?" (e g , headache, arm or leg weakness)      SOB often, heart racing, nausea, HA, hands feel tight and look swollen for past 3 days  8  PREGNANCY: "How many weeks pregnant are you?"       38 weeks pregnant    9  SALLY: "What date are you expecting to deliver?"       23 scheduled     Patient had a urine protein/creatinine ratio test yesterday- 0 34      Protocols used: PREGNANCY - VISION LOSS OR CHANGE-ADULT-AH

## 2022-12-30 NOTE — PROGRESS NOTES
L&D Triage Note - OB/GYN  Angelina Camp 28 y o  female MRN: [id number]  Unit/Bed#: LD TRIAGE  Encounter: [id number]      ASSESSMENT:    Sultana Ferreira is a 28 y o  [id number] at 38w0d  who presents today due to concern for preeclampsia  Due to her normal blood pressures and normal blood work she does not meet criteria for any hypertensive diagnosis and is safe to be discharged home at this time  We discussed that there is no occasion to deliver patient at this time, she requested discharge home  PLAN:    1) Blood pressure monitoring for  2) Collect PreE Labs  • CMC  • CMP  • UA  3) EKG-within normal limits  4)Protonix and mag for treatment of upper belly pain and spots in her vision  4) continue routine prenatal care  5) Discharge from [facility] triage with term labor precautions    - Reviewed rupture of membranes, false vs true labor, decreased fetal movement, and vaginal bleeding   - Pt to call provider with any concerns and follow up at her next scheduled prenatal appointment    - Case discussed with Dr Karolyn Ryder:    Sultana Ferreira 28 y o  [id number] at 38w0d with an Estimated Date of Delivery: 23 who presented today to triage due to a chief complaint of difficulty breathing and racing heart, spots in her vision and upper belly pain  Patient has previously been evaluated due to concern for preeclampsia and she has a known elevated protein to creatinine ratio of 0 34  Patient is scheduled for a repeat  section on 2023         Contractions: Denies  Leakage of fluid: Denies  Vaginal Bleeding: Denies  Fetal movement: present    OBJECTIVE:    Vitals:    22 2225   BP: 100/60   Pulse: 96   Resp:    Temp:    SpO2:      Lab Results   Component Value Date/Time     2022 07:23 PM    AST 16 2022 07:23 PM    ALT 10 2022 07:23 PM       ROS:  Constitutional: Negative  Neuro: headache, complains of vision changes including spots in her vision  Respiratory: difficulty breathing  Cardiovascular:  retrosternal pain, is experiencing racing of her heart  Gastrointestinal: Epigastric pain present    General Physical Exam:  General: in no apparent distress, non-toxic and alert  Cardiovascular: Cor RRR  Lungs: non-labored breathing  Abdomen: abdomen is soft without significant tenderness, masses, organomegaly or guarding  Lower extremeties: nontender    Cervical Exam    SVE: 1 / 50% / -2    Fetal monitoring:  FHT:  125 bpm/ Moderate 6 - 25 bpm / 15 x 15 accelerations present, no decelerations  Banks: contractions not noted            Jericho Dye MD,  OBGYN PGY-1  12/31/2022 4:06 AM

## 2022-12-30 NOTE — TELEPHONE ENCOUNTER
Regardin Weeks pregnant heart racing, trouble breathing, spots in vision and protein in urne  ----- Message from [employer] sent at 2022  4:45 PM EST -----  '' I'm 38 weeks pregnant, my heart has been racing, trouble breathing, spots in my vision and protein in my urine concerned ''

## 2022-12-31 ENCOUNTER — HOSPITAL ENCOUNTER (OUTPATIENT)
Facility: HOSPITAL | Age: 32
Discharge: HOME | End: 2022-12-31
Attending: OBSTETRICS & GYNECOLOGY | Admitting: OBSTETRICS & GYNECOLOGY

## 2022-12-31 ENCOUNTER — NURSE TRIAGE (OUTPATIENT)
Dept: OTHER | Facility: OTHER | Age: 32
End: 2022-12-31

## 2022-12-31 VITALS
HEART RATE: 81 BPM | DIASTOLIC BLOOD PRESSURE: 63 MMHG | RESPIRATION RATE: 20 BRPM | SYSTOLIC BLOOD PRESSURE: 109 MMHG | TEMPERATURE: 98.4 F

## 2022-12-31 PROBLEM — Z3A.38 38 WEEKS GESTATION OF PREGNANCY: Status: ACTIVE | Noted: 2022-11-11

## 2022-12-31 LAB
ALBUMIN SERPL BCP-MCNC: 3.4 G/DL (ref 3.5–5)
ALP SERPL-CCNC: 151 U/L (ref 34–104)
ALT SERPL W P-5'-P-CCNC: 11 U/L (ref 7–52)
ANION GAP SERPL CALCULATED.3IONS-SCNC: 9 MMOL/L (ref 4–13)
AST SERPL W P-5'-P-CCNC: 16 U/L (ref 13–39)
ATRIAL RATE: 93 BPM
ATRIAL RATE: 93 BPM
BILIRUB SERPL-MCNC: 0.56 MG/DL (ref 0.2–1)
BUN SERPL-MCNC: 8 MG/DL (ref 5–25)
CALCIUM ALBUM COR SERPL-MCNC: 9.4 MG/DL (ref 8.3–10.1)
CALCIUM SERPL-MCNC: 8.9 MG/DL (ref 8.4–10.2)
CHLORIDE SERPL-SCNC: 106 MMOL/L (ref 96–108)
CO2 SERPL-SCNC: 21 MMOL/L (ref 21–32)
CREAT SERPL-MCNC: 0.55 MG/DL (ref 0.6–1.3)
ERYTHROCYTE [DISTWIDTH] IN BLOOD BY AUTOMATED COUNT: 18 % (ref 11.6–15.1)
FLUAV RNA RESP QL NAA+PROBE: NEGATIVE
FLUBV RNA RESP QL NAA+PROBE: NEGATIVE
GFR SERPL CREATININE-BSD FRML MDRD: 124 ML/MIN/1.73SQ M
GLUCOSE SERPL-MCNC: 80 MG/DL (ref 65–140)
HCT VFR BLD AUTO: 31.5 % (ref 34.8–46.1)
HGB BLD-MCNC: 10.5 G/DL (ref 11.5–15.4)
MCH RBC QN AUTO: 30 PG (ref 26.8–34.3)
MCHC RBC AUTO-ENTMCNC: 33.3 G/DL (ref 31.4–37.4)
MCV RBC AUTO: 90 FL (ref 82–98)
P AXIS: 25 DEGREES
P AXIS: 37 DEGREES
PLATELET # BLD AUTO: 252 THOUSANDS/UL (ref 149–390)
PMV BLD AUTO: 10.7 FL (ref 8.9–12.7)
POTASSIUM SERPL-SCNC: 3.8 MMOL/L (ref 3.5–5.3)
PR INTERVAL: 138 MS
PR INTERVAL: 144 MS
PROT SERPL-MCNC: 6 G/DL (ref 6.4–8.4)
QRS AXIS: 42 DEGREES
QRS AXIS: 43 DEGREES
QRSD INTERVAL: 68 MS
QRSD INTERVAL: 68 MS
QT INTERVAL: 348 MS
QT INTERVAL: 354 MS
QTC INTERVAL: 432 MS
QTC INTERVAL: 440 MS
RBC # BLD AUTO: 3.5 MILLION/UL (ref 3.81–5.12)
RSV RNA RESP QL NAA+PROBE: NEGATIVE
SARS-COV-2 RNA RESP QL NAA+PROBE: NEGATIVE
SODIUM SERPL-SCNC: 136 MMOL/L (ref 135–147)
T WAVE AXIS: 32 DEGREES
T WAVE AXIS: 33 DEGREES
VENTRICULAR RATE: 93 BPM
VENTRICULAR RATE: 93 BPM
WBC # BLD AUTO: 11.86 THOUSAND/UL (ref 4.31–10.16)

## 2022-12-31 RX ORDER — ONDANSETRON 2 MG/ML
4 INJECTION INTRAMUSCULAR; INTRAVENOUS ONCE
Status: COMPLETED | OUTPATIENT
Start: 2022-12-31 | End: 2022-12-31

## 2022-12-31 RX ORDER — ONDANSETRON 4 MG/1
4 TABLET, ORALLY DISINTEGRATING ORAL ONCE
Status: DISCONTINUED | OUTPATIENT
Start: 2022-12-31 | End: 2022-12-31

## 2022-12-31 RX ORDER — DIPHENHYDRAMINE HYDROCHLORIDE 50 MG/ML
25 INJECTION INTRAMUSCULAR; INTRAVENOUS ONCE
Status: COMPLETED | OUTPATIENT
Start: 2022-12-31 | End: 2022-12-31

## 2022-12-31 RX ORDER — ACETAMINOPHEN 500 MG
1000 TABLET ORAL EVERY 6 HOURS PRN
COMMUNITY

## 2022-12-31 RX ORDER — ACETAMINOPHEN 325 MG/1
650 TABLET ORAL EVERY 6 HOURS PRN
Status: DISCONTINUED | OUTPATIENT
Start: 2022-12-31 | End: 2022-12-31 | Stop reason: HOSPADM

## 2022-12-31 RX ORDER — METOCLOPRAMIDE HYDROCHLORIDE 5 MG/ML
10 INJECTION INTRAMUSCULAR; INTRAVENOUS ONCE
Status: COMPLETED | OUTPATIENT
Start: 2022-12-31 | End: 2022-12-31

## 2022-12-31 RX ADMIN — DIPHENHYDRAMINE HYDROCHLORIDE 25 MG: 50 INJECTION, SOLUTION INTRAMUSCULAR; INTRAVENOUS at 15:47

## 2022-12-31 RX ADMIN — SODIUM CHLORIDE, POTASSIUM CHLORIDE, SODIUM LACTATE AND CALCIUM CHLORIDE 1000 ML: 600; 310; 30; 20 INJECTION, SOLUTION INTRAVENOUS at 15:44

## 2022-12-31 RX ADMIN — METOCLOPRAMIDE 10 MG: 5 INJECTION, SOLUTION INTRAMUSCULAR; INTRAVENOUS at 15:50

## 2022-12-31 RX ADMIN — ONDANSETRON 4 MG: 2 INJECTION INTRAMUSCULAR; INTRAVENOUS at 15:48

## 2022-12-31 NOTE — PROGRESS NOTES
L&D Triage Note - OB/GYN  Mikki Kramer 28 y o  female MRN: [id number]  Unit/Bed#: LD TRIAGE 4-01 Encounter: [id number]      ASSESSMENT:    Mikki Kramer is a 28 y o  [id number] at 38w1d presenting with a headache, dizziness, and nausea  Low suspicion for preeclampsia at this time given normal blood pressures and labs as well as resolution of symptoms  Suitable for discharge home  PLAN:    1) rule out preeclampsia   - CBC/CMP wnl, P:C 0 34 (from 12/29)   - Normotensive here and throughout pregnancy   - Fluid bolus   - Tylenol, reglan, benadryl for headache - resolved   - Zofran for nausea - resolved   - Flu/RSV/COVID swab - negative    2) Continue routine prenatal care  3) Discharge from [facility] triage with term labor precautions    - Reviewed rupture of membranes, false vs true labor, decreased fetal movement, and vaginal bleeding   - Pt to call provider with any concerns and follow up at her next scheduled prenatal appointment    - Case discussed with Dr Selma Salazar and Dr Errol Winchester:    Mikki Kramer 28 y o  [id number] at 38w1d with an Estimated Date of Delivery: 1/13/23 comes in after feeling dizzy and seeing spots when walking her dog  She describes the vision changes as lights, but they are not associated with headaches, although she does get headaches  She currently has an occipital headache but no vision changes  She is feeling nauseous and short of breath, and endorses one episode of sharp RUQ pain earlier today when she sat up       Her current obstetrical history is significant for Iron deficiency anemia and BV during the pregnancy    Her past obstetrical history is significant for 2 previous CS    Contractions: denies  Leakage of fluid: denies  Vaginal Bleeding: denies  Fetal movement: present    OBJECTIVE:    Vitals:    12/31/22 1631   BP: 109/63   Pulse: 81   Resp:    Temp:        ROS:  Constitutional: As per HPI  Respiratory: Negative  Cardiovascular: Negative    Gastrointestinal: As per HPI    General Physical Exam:  General: non-toxic, alert and cooperative  Cardiovascular: Cor RRR  Lungs: non-labored breathing  Abdomen: abdomen is soft without significant tenderness, masses, organomegaly or guarding  Lower extremeties: nontender      Fetal monitoring:  FHT:  135 bpm/ Moderate 6 - 25 bpm / 15 x 15 accelerations present, no decelerations  [place]: quiet     Caden Falcon MD  OBGYN PGY-1  12/31/2022 5:00 PM

## 2022-12-31 NOTE — TELEPHONE ENCOUNTER
Answer Assessment - Initial Assessment Questions  1  DESCRIPTION: "What is the vision loss like? Describe it for me " (e g , complete vision loss, blurred vision, double vision, floaters, etc )      Was walking her dog earlier and became dizzy/lightheaded  Noticed spots in her vision  Says she has been dealing with vision changes since early pregnancy  2  LOCATION: "One or both eyes?" If one, ask: "Which eye?"      Both eyes    3  SEVERITY: "Can you see anything?" If Yes, ask: "What can you see?" (e g , fine print)      Yes- is able to see    4  ONSET: "When did this begin?" "Did it start suddenly (minutes, hours) or has this been gradual (weeks, months)?"      Today- earlier in the day but dealing with this on and off since beginning of pregnancy  5  PATTERN: "Does this come and go, or has it been constant since it started?"      Comes and goes    6  PAIN: "Is there any pain in your eye(s)?"  (Scale 1-10; or mild, moderate, severe)      No pain    7  CAUSE: "What do you think is causing this visual problem?"      Unknown- denies problems with BP- unable to check BP at home- no BP monitor  9  OTHER SYMPTOMS: "Do you have any other symptoms?" (e g , headache, arm or leg weakness)      Also reports feeling out of breath and nausea  10  PREGNANCY: "How many weeks pregnant are you?"        38 wks 1 day    11  ASLLY: "What date are you expecting to deliver?"        1/13  Has scheduled Csection on 1/6    Protocols used: PREGNANCY - VISION LOSS OR CHANGE-ADULT-AH    Per Dr Salima Pinon, recommended L&D erica    Pt  Made aware

## 2022-12-31 NOTE — TELEPHONE ENCOUNTER
Regardin WKS OB HEADACHE DIZZY SEEING SPOTS IN HER VISION  ----- Message from Cha Valencia sent at 2022  1:38 PM EST -----  Patient went to L&D yesterday and took blood work  She feels weird, spots in her vision, headache when she woke up  She went to walk her dog she got lightheaded and dizzy the spots returned again  She tried to rest and sleep but she is feeling weird and nauseous     She is 38 weeks pregnant

## 2022-12-31 NOTE — DISCHARGE INSTRUCTIONS
Pregnancy at 28 to [address]:   What changes are happening with my body? You are considered full term at the beginning of 37 weeks  Your breathing may be easier if your baby has moved down into a head-down position  You may need to urinate more often because the baby may be pressing on your bladder  You may also feel more discomfort and get tired easily  How do I care for myself at this stage of my pregnancy? Eat a variety of healthy foods  Healthy foods include fruits, vegetables, whole-grain breads, low-fat dairy foods, beans, lean meats, and fish  Drink liquids as directed  Ask how much liquid to drink each day and which liquids are best for you  Limit caffeine to less than 200 milligrams each day  Limit your intake of fish to 2 servings each week  Choose fish low in mercury such as canned light tuna, shrimp, salmon, cod, or tilapia  Do not  eat fish high in mercury such as swordfish, tilefish, eusebio mackerel, and shark  Take prenatal vitamins as directed  Your need for certain vitamins and minerals, such as folic acid, increases during pregnancy  Prenatal vitamins provide some of the extra vitamins and minerals you need  Prenatal vitamins may also help to decrease the risk of certain birth defects  Rest as needed  Put your feet up if you have swelling in your ankles and feet  Talk to your healthcare provider about exercise  Moderate exercise can help you stay fit  Your healthcare provider will help you plan an exercise program that is safe for you during pregnancy  Do not smoke  Smoking increases your risk of a miscarriage and other health problems during your pregnancy  Smoking can cause your baby to be born early or weigh less at birth  Ask your healthcare provider for information if you need help quitting  Do not drink alcohol  Alcohol passes from your body to your baby through the placenta   It can affect your baby's brain development and cause fetal alcohol syndrome (FAS)  FAS is a group of conditions that causes mental, behavior, and growth problems  Talk to your healthcare provider before you take any medicines  Many medicines may harm your baby if you take them when you are pregnant  Do not take any medicines, vitamins, herbs, or supplements without first talking to your healthcare provider  Never use illegal or street drugs (such as marijuana or cocaine) while you are pregnant  What are some safety tips during pregnancy? Avoid hot tubs and saunas  Do not use a hot tub or sauna while you are pregnant, especially during your first trimester  Hot tubs and saunas may raise your baby's temperature and increase the risk of birth defects  Avoid toxoplasmosis  This is an infection caused by eating raw meat or being around infected cat feces  It can cause birth defects, miscarriages, and other problems  Wash your hands after you touch raw meat  Make sure any meat is well-cooked before you eat it  Avoid raw eggs and unpasteurized milk  Use gloves or ask someone else to clean your cat's litter box while you are pregnant  Ask your healthcare provider about travel  The most comfortable time to travel is during the second trimester  Ask your provider if you can travel after 36 weeks  You may not be able to travel in an airplane after 36 weeks  He or she may also recommend you avoid long road trips  What changes are happening with my baby? By 38 weeks, your baby may weigh between 6 and 9 pounds  Your baby may be about 14 inches long from the top of the head to the rump (baby's bottom)  Your baby hears well enough to know your voice  As your baby gets larger, you may feel fewer kicks and more stretching and rolling  Your baby may move into a head-down position  Your baby will also rest lower in your abdomen  What do I need to know about prenatal care? Your healthcare provider will check your blood pressure and weight   You may also need the following:  A urine test  may also be done to check for sugar and protein  These can be signs of gestational diabetes or infection  Protein in your urine may also be a sign of preeclampsia  Preeclampsia is a condition that can develop during week 20 or later of your pregnancy  It causes high blood pressure, and it can cause problems with your kidneys and other organs  A gestational diabetes screen  may be done  Your healthcare provider may order either a 1-step or 2-step oral glucose tolerance test (OGTT)  1-step OGTT:  Your blood sugar level will be tested after you have not eaten for 8 hours (fasting)  You will then be given a glucose drink  Your level will be tested again 1 hour and 2 hours after you finish the drink  2-step OGTT:  You do not have to fast for the first part of the test  You will have the glucose drink at any time of day  Your blood sugar level will be checked 1 hour later  If your blood sugar is higher than a certain level, another test will be ordered  You will fast and your blood sugar level will be tested  You will have the glucose drink  Your blood will be tested again 1 hour, 2 hours, and 3 hours after you finish the glucose drink  A blood test  may be done to check for anemia (low iron level)  A Tdap vaccine  may be recommended by your healthcare provider  A group B strep test  is a test that is done to check for group B strep infection  Group B strep is a type of bacteria that may be found in the vagina or rectum  It can be passed to your baby during delivery if you have it  Your healthcare provider will take swab your vagina or rectum and send the sample to the lab for tests  Fundal height  is a measurement of your uterus to check your baby's growth  This number is usually the same as the number of weeks that you have been pregnant  Your healthcare provider may also check your baby's position  Your baby's heart rate  will be checked      When should I seek immediate care? You develop a severe headache that does not go away  You have new or increased vision changes, such as blurred or spotted vision  You have new or increased swelling in your face or hands  You have vaginal spotting or bleeding  Your water broke or you feel warm water gushing or trickling from your vagina  When should I call my obstetrician? You have more than 5 contractions in 1 hour  You notice any changes in your baby's movements  You have abdominal cramps, pressure, or tightening  You have a change in vaginal discharge  You have chills or a fever  You have vaginal itching, burning, or pain  You have yellow, green, white, or foul-smelling vaginal discharge  You have pain or burning when you urinate, less urine than usual, or pink or bloody urine  You have questions or concerns about your condition or care  CARE AGREEMENT:   You have the right to help plan your care  Learn about your health condition and how it may be treated  Discuss treatment options with your healthcare providers to decide what care you want to receive  You always have the right to refuse treatment  The above information is an  only  It is not intended as medical advice for individual conditions or treatments  Talk to your doctor, nurse or pharmacist before following any medical regimen to see if it is safe and effective for you  © Copyright [employer] 2022 Information is for End User's use only and may not be sold, redistributed or otherwise used for commercial purposes   All illustrations and images included in CareNotes® are the copyrighted property of A D A M , Inc  or [address] [place] [address]

## 2023-01-03 NOTE — H&P
H & P- Obstetrics   Angelina Pride Doctor 28 y o  female MRN: [id number]  Unit/Bed#: LD PACU-02 Encounter: [id number]    Assessment: 28 y o  [id number] at 39w0d admitted for scheduled RLTCS  Plan:   * 38 weeks gestation of pregnancy  Assessment & Plan  · Admit for scheduled RLTCS  · Continuous fetal monitoring  · NPO  · Routine admission labs (CBC, RPR, T&S)  · Contraception: post-placental Mirena IUD    Isolated proteinuria  Assessment & Plan  No elevated BP this pregnancy  Urine P:C 0 34  Iron deficiency anemia  Assessment & Plan  Taking PO iron, Colace, and MiraLax  Ferritin 6  Hgb 11 9 on admission  Marijuana use disorder in remission  Assessment & Plan  Used marijuana prior to pregnancy, stopped once pregnant  Admission UDS negative  · CM consult postpartum    Discussed case and plan w/ Dr Ott General    Chief Complaint: "I'm here for my "    HPI:  Celeste Wilson is a 28 y o  [id number] with an SALLY of 2023, by Last Menstrual Period at 39w0d who is being admitted for scheduled RLTCS  She denies having uterine contractions, has no LOF, and reports no VB  She states she has felt good FM  Patient Active Problem List   Diagnosis   • History of  delivery   • Marijuana use disorder in remission   • Iron deficiency anemia   • 38 weeks gestation of pregnancy   • Decreased fetal movement   • BV (bacterial vaginosis)   • Isolated proteinuria       Baby complications/comments: none    Review of Systems   Constitutional: Negative for chills and fever  Eyes: Negative for visual disturbance  Respiratory: Negative for cough and shortness of breath  Cardiovascular: Negative for chest pain and leg swelling  Gastrointestinal: Positive for diarrhea and vomiting  Negative for abdominal pain and nausea  Genitourinary: Negative for dysuria, frequency, hematuria, pelvic pain, urgency, vaginal bleeding and vaginal discharge  Neurological: Positive for headaches   Negative for dizziness and light-headedness  OB Hx:  OB History    Para Term  AB Living   5 2 2   2 2   SAB IAB Ectopic Multiple Live Births     2     2      # Outcome Date GA Lbr Jose/2nd Weight Sex Delivery Anes PTL Lv   5 Current            4 Term      CS-LTranv      3 Term      CS-LTranv      2 IAB            1 IAB                Past Medical Hx:  Past Medical History:   Diagnosis Date   • Varicella     disease as a child       Past Surgical hx:  Past Surgical History:   Procedure Laterality Date   •  SECTION     • COSMETIC SURGERY      Pt states " [address] [place] [address] "   • LIPOSUCTION         Social Hx:  Alcohol use: no  Tobacco use: no  Other substance use: marijuana use early in pregnancy (stopped using upon learning she was pregnant)  Other: N/A    No Known Allergies    Medications Prior to Admission   Medication   • acetaminophen (TYLENOL) 500 mg tablet   • Prenatal Vit-Fe Fumarate-FA (PRENATAL VITAMINS PO)   • docusate sodium (COLACE) 100 mg capsule   • ferrous sulfate (MISHA-IN-SOL) 75 (15 Fe) mg/mL drops   • ondansetron (ZOFRAN) 4 mg tablet       Objective:  Temp:  [97 5 °F (36 4 °C)] 97 5 °F (36 4 °C)  HR:  [117] 117  Resp:  [18] 18  BP: (103)/(69) 103/69  Body mass index is 29 74 kg/m²  Physical Exam:  Physical Exam  Constitutional:       General: She is not in acute distress  Appearance: Normal appearance  She is not ill-appearing  HENT:      Mouth/Throat:      Mouth: Mucous membranes are moist    Eyes:      Extraocular Movements: Extraocular movements intact  Cardiovascular:      Rate and Rhythm: Normal rate and regular rhythm  Heart sounds: Normal heart sounds  Pulmonary:      Effort: Pulmonary effort is normal       Breath sounds: Normal breath sounds  Abdominal:      General: There is no distension  Palpations: Abdomen is soft  Tenderness: There is no abdominal tenderness  There is no guarding  Musculoskeletal:         General: No swelling        Right lower leg: No edema  Left lower leg: No edema  Neurological:      General: No focal deficit present  Mental Status: She is alert  Skin:     General: Skin is warm and dry  Coloration: Skin is not jaundiced or pale  Psychiatric:         Mood and Affect: Mood normal          Behavior: Behavior normal          Thought Content:  Thought content normal          Judgment: Judgment normal         FHT:  Baseline: 140 bpm  Variability: moderate  Accelerations: present  Decelerations: absent  Category 1    TOCO:   Irregular    Lab Results   Component Value Date    WBC 13 04 (H) 01/06/2023    HGB 11 9 01/06/2023    HCT 36 1 01/06/2023     01/06/2023     Lab Results   Component Value Date    K 3 8 12/31/2022     12/31/2022    CO2 21 12/31/2022    BUN 8 12/31/2022    CREATININE 0 55 (L) 12/31/2022    AST 16 12/31/2022    ALT 11 12/31/2022     Prenatal Labs: Reviewed      Blood type: O positive  Antibody: negative  GBS: negative  HIV: negative  Rubella: Immune  VDRL/RPR: Non reactive  HBsAg: Negative  Chlamydia: Negative  Gonorrhea: Negative  Diabetes 1 hour screen: 94  Platelets: 572  Hgb: 12 6  >2 Midnights  INPATIENT     Signature/Title: Guillermo Gamez MD  Date: 1/6/2023  Time: 2:03 PM

## 2023-01-04 ENCOUNTER — ROUTINE PRENATAL (OUTPATIENT)
Dept: OBGYN CLINIC | Facility: CLINIC | Age: 33
End: 2023-01-04

## 2023-01-04 VITALS
SYSTOLIC BLOOD PRESSURE: 114 MMHG | DIASTOLIC BLOOD PRESSURE: 74 MMHG | WEIGHT: 183.8 LBS | HEART RATE: 92 BPM | HEIGHT: 66 IN | BODY MASS INDEX: 29.54 KG/M2

## 2023-01-04 DIAGNOSIS — Z3A.38 38 WEEKS GESTATION OF PREGNANCY: Primary | ICD-10-CM

## 2023-01-04 DIAGNOSIS — B37.31 VULVOVAGINAL CANDIDIASIS: ICD-10-CM

## 2023-01-04 DIAGNOSIS — Z34.93 PRENATAL CARE IN THIRD TRIMESTER: ICD-10-CM

## 2023-01-04 RX ORDER — FLUCONAZOLE 150 MG/1
150 TABLET ORAL ONCE
Qty: 1 TABLET | Refills: 0 | Status: SHIPPED | OUTPATIENT
Start: 2023-01-04 | End: 2023-01-04

## 2023-01-04 NOTE — PROGRESS NOTES
Rosibel Theodore presents today for routine OB visit at 38w5d  She is a [id number] with a history of two c-sections  She is scheduled for a repeat  in two days  She reports that over the past few days she has had worsening vaginal and vulvar itching and irritation and the vulvar tissue feels raw  Blood Pressure: 114/74  Wt=83 4 kg (183 lb 12 8 oz); Body mass index is 29 67 kg/m² ; TWG=18 1 kg (39 lb 12 8 oz)  Fetal Heart Rate: 145; Fundal Height (cm): 38 cm  SVE today: declines   Abdomen: gravid, soft, non-tender  Labia majora and vaginal opening with erythema  She has clumpy, white discharge adherent to labia minora and around vaginal opening consistent with yeast    Denies uterine contractions  Denies vaginal bleeding or leaking of fluid  Reports adequate fetal movement of at least 10 movements in 2 hours once daily  Discussed vulvar skin care measures  Diflucan dose x1 for yeast infection  Reviewed labor precautions and fetal kick counts as well as pre-eclampsia warning signs  Reviewed perineal massage for decreasing risk of perineal lacerations during delivery  Will present to L&D on 23 for scheduled   Current Outpatient Medications   Medication Instructions   • acetaminophen (TYLENOL) 1,000 mg, Oral, Every 6 hours PRN   • docusate sodium (COLACE) 100 mg, Oral, 2 times daily   • ferrous sulfate (MISHA-IN-SOL) 75 (15 Fe) mg/mL drops 30 mg of iron, Oral, Daily   • fluconazole (DIFLUCAN) 150 mg, Oral, Once   • ondansetron (ZOFRAN) 4 mg, Oral, Every 8 hours PRN   • Prenatal Vit-Fe Fumarate-FA (PRENATAL VITAMINS PO) Oral     Laboratory workup: initial OB labs (normal); 28 week labs (complete); 36 week cultures (GBS negative)    Genetic Screening: NIPS low risk  AFP negative  Vaccinations: influenza (declined); Tdap (received); COVID-19 (recommended)    Postpartum contraception: IUD    Fetal Ultrasounds:  22 22w4d: Anterior placenta   No fetal anomalies are seen on today's survey, and the survey is now complete  No further ultrasounds are recommended at this time      SALLY 23 Problems (from 22 to present)     Problem Noted Resolved    38 weeks gestation of pregnancy 2022 by Gianfranco Dasilva DO No    Overview Addendum 2022  1:09 PM by Arnold Sim MD     Overview:  • Labs  o Pap smear and GC/CT completed  o Prenatal panel completed and wnl other than E   Coli UTI- treated  o 28w labs completed  • Vaccines:  o Flu vaccine: declines  o Covid vaccine: completed  o Tdap vaccine: 10/28/22  • Contraception: post-placental IUD  • Breastfeeding: yes[]  • Birth plan: repeat   • Delivery consent: signed 10/28  • Ultrasounds: Anatomy scan wnl  • Genetic screening low risk, MSAFP normal   • Repeat  section scheduled 23               Prenatal care, third trimester 10/27/2022 by Fani Saunders MD No    Iron deficiency anemia 10/13/2022 by Suzi Duran MD No    Overview Addendum 10/28/2022  2:36 PM by Fani Saunders MD     Hgb trend:  Lab Results   Component Value Date/Time    HGB 10 0 (L) 10/12/2022 11:13 AM    HGB 12 6 2022 08:45 AM     Pt on PO iron; given Colace/MiraLax  Repeat CBC in 1 month; ordered          History of  delivery 2022 by Arnold Sim MD No    Overview Addendum 10/28/2022  2:34 PM by Fani Saunders MD     History of two prior term  sections  Also has history of an liposuction  Plan for delivery via repeat  section         29 weeks gestation of pregnancy 2022 by Arnold Sim MD 2022 by Yolis Pope MD    Overview Addendum 10/28/2022  2:37 PM by Fani Saunders MD     Anatomy US wnl, no further US indicated at this time  Genetic screening low risk  MSAFP normal  PNL wnl except for urine culture -> E coli UTI treated late  week labs within normal limits except for hemoglobin of 10  Tdap 10/28/22  Delivery consent 10/28/22  Rhogam not indicated Contraception plan:  Patient longer desires permanent sterilization; now desires Postplacental IUD     RTC q2 weeks for routine prenatal care         UTI (urinary tract infection) during pregnancy 2022 by Lila Steward MD 10/11/2022 by Admin Ambulatory    Overview Signed 2022  8:03 AM by Lila Steward MD     E   Coli UTI on prenatal panel; Macrobid prescribed         Encounter for sterilization 2022 by Lila Steward MD 2022 by Matt Lauren MD    Overview Addendum 10/28/2022  2:34 PM by Magaly Clay MD     Patient desires tubal ligation at the time of her repeat  section; MA-31 needs to be signed  UDS on admission for delivery

## 2023-01-05 PROBLEM — Z34.93 PRENATAL CARE, THIRD TRIMESTER: Status: RESOLVED | Noted: 2022-10-27 | Resolved: 2023-01-05

## 2023-01-05 PROBLEM — R80.0 ISOLATED PROTEINURIA: Status: ACTIVE | Noted: 2023-01-05

## 2023-01-05 NOTE — PRE-PROCEDURE INSTRUCTIONS
Phone call to patient for pre-operative instructions prior to     Pt was instructed to arrive at 1100 2 hours before her scheduled OR time at 1300      Pt instructed to remain NPO after midnight  *This includes gum, water and hard candy  *If patient takes AM meds (e g hypertensive meds) they may take these meds with a sip of water  *This should not include medications like prenatal vitamins Aspirin or colace  *Type 1 diabetics should stay on their normal insulin regime or as ordered by their doctor  Type 2 on insulin should take 1/2 dose of their overnight insulin or as instructed by their doctor  Pt should brush their teeth as usual     Pt was instructed to buy and use a pre-surgical wash containing chlorhexidine the night before and the morning of her scheduled  and to wear clean clothing after the wash  Pt instructed not to shave operative area prior to surgery  Pt was asked not to wear any jewelry and to leave all of her valuables at home  Pt was asked to leave all of her larger bags and suitcases in the car to be brought in after she is assigned a post partum room  Pt was informed that she may have 1 support person in the OR/PACU area and of the current visiting policies

## 2023-01-06 ENCOUNTER — ANESTHESIA EVENT (INPATIENT)
Dept: LABOR AND DELIVERY | Facility: HOSPITAL | Age: 33
End: 2023-01-06

## 2023-01-06 ENCOUNTER — HOSPITAL ENCOUNTER (INPATIENT)
Facility: HOSPITAL | Age: 33
LOS: 3 days | Discharge: HOME | End: 2023-01-09
Attending: OBSTETRICS & GYNECOLOGY | Admitting: OBSTETRICS & GYNECOLOGY

## 2023-01-06 ENCOUNTER — ANESTHESIA (INPATIENT)
Dept: LABOR AND DELIVERY | Facility: HOSPITAL | Age: 33
End: 2023-01-06

## 2023-01-06 DIAGNOSIS — F12.91 MARIJUANA USE DISORDER IN REMISSION: ICD-10-CM

## 2023-01-06 DIAGNOSIS — Z98.891 STATUS POST REPEAT LOW TRANSVERSE CESAREAN SECTION: ICD-10-CM

## 2023-01-06 DIAGNOSIS — O34.219 PREVIOUS CESAREAN SECTION COMPLICATING PREGNANCY: ICD-10-CM

## 2023-01-06 DIAGNOSIS — Z98.891 HISTORY OF CESAREAN DELIVERY: Primary | ICD-10-CM

## 2023-01-06 LAB
ABO GROUP BLD: NORMAL
AMPHETAMINES SERPL QL SCN: NEGATIVE
BARBITURATES UR QL: NEGATIVE
BASE EXCESS BLDCOA CALC-SCNC: -5.1 MMOL/L (ref 3–11)
BASE EXCESS BLDCOV CALC-SCNC: -4.5 MMOL/L (ref 1–9)
BASOPHILS # BLD MANUAL: 0 THOUSAND/UL (ref 0–0.1)
BASOPHILS NFR MAR MANUAL: 0 % (ref 0–1)
BENZODIAZ UR QL: NEGATIVE
BLD GP AB SCN SERPL QL: NEGATIVE
COCAINE UR QL: NEGATIVE
EOSINOPHIL # BLD MANUAL: 0 THOUSAND/UL (ref 0–0.4)
EOSINOPHIL NFR BLD MANUAL: 0 % (ref 0–6)
ERYTHROCYTE [DISTWIDTH] IN BLOOD BY AUTOMATED COUNT: 17.9 % (ref 11.6–15.1)
HCO3 BLDCOA-SCNC: 20.6 MMOL/L (ref 17.3–27.3)
HCO3 BLDCOV-SCNC: 21.4 MMOL/L (ref 12.2–28.6)
HCT VFR BLD AUTO: 36.1 % (ref 34.8–46.1)
HGB BLD-MCNC: 11.9 G/DL (ref 11.5–15.4)
HOLD SPECIMEN: NORMAL
LYMPHOCYTES # BLD AUTO: 1.04 THOUSAND/UL (ref 0.6–4.47)
LYMPHOCYTES # BLD AUTO: 8 % (ref 14–44)
MCH RBC QN AUTO: 30.2 PG (ref 26.8–34.3)
MCHC RBC AUTO-ENTMCNC: 33 G/DL (ref 31.4–37.4)
MCV RBC AUTO: 92 FL (ref 82–98)
METHADONE UR QL: NEGATIVE
MONOCYTES # BLD AUTO: 0.26 THOUSAND/UL (ref 0–1.22)
MONOCYTES NFR BLD: 2 % (ref 4–12)
NEUTROPHILS # BLD MANUAL: 11.74 THOUSAND/UL (ref 1.85–7.62)
NEUTS BAND NFR BLD MANUAL: 1 % (ref 0–8)
NEUTS SEG NFR BLD AUTO: 89 % (ref 43–75)
O2 CT VFR BLDCOA CALC: 11 ML/DL
OPIATES UR QL SCN: NEGATIVE
OXYCODONE+OXYMORPHONE UR QL SCN: NEGATIVE
OXYHGB MFR BLDCOA: 48.8 %
OXYHGB MFR BLDCOV: 72 %
PCO2 BLDCOA: 40.8 MM[HG] (ref 30–60)
PCO2 BLDCOV: 42.2 MM HG (ref 27–43)
PCP UR QL: NEGATIVE
PH BLDCOA: 7.32 [PH] (ref 7.23–7.43)
PH BLDCOV: 7.32 [PH] (ref 7.19–7.49)
PLATELET # BLD AUTO: 218 THOUSANDS/UL (ref 149–390)
PLATELET BLD QL SMEAR: ADEQUATE
PMV BLD AUTO: 11.1 FL (ref 8.9–12.7)
PO2 BLDCOA: 21.6 MM HG (ref 5–25)
PO2 BLDCOV: 31.7 MM HG (ref 15–45)
RBC # BLD AUTO: 3.94 MILLION/UL (ref 3.81–5.12)
RBC MORPH BLD: NORMAL
RH BLD: POSITIVE
SAO2 % BLDCOV: 15.8 ML/DL
SPECIMEN EXPIRATION DATE: NORMAL
THC UR QL: NEGATIVE
WBC # BLD AUTO: 13.04 THOUSAND/UL (ref 4.31–10.16)

## 2023-01-06 PROCEDURE — 0UH90HZ INSERTION OF CONTRACEPTIVE DEVICE INTO UTERUS, OPEN APPROACH: ICD-10-PCS | Performed by: OBSTETRICS & GYNECOLOGY

## 2023-01-06 PROCEDURE — 4A1HXCZ MONITORING OF PRODUCTS OF CONCEPTION, CARDIAC RATE, EXTERNAL APPROACH: ICD-10-PCS | Performed by: OBSTETRICS & GYNECOLOGY

## 2023-01-06 RX ORDER — HYDROMORPHONE HCL/PF 1 MG/ML
0.5 SYRINGE (ML) INJECTION
Status: DISCONTINUED | OUTPATIENT
Start: 2023-01-06 | End: 2023-01-09 | Stop reason: HOSPADM

## 2023-01-06 RX ORDER — FENTANYL CITRATE/PF 50 MCG/ML
50 SYRINGE (ML) INJECTION
Status: DISCONTINUED | OUTPATIENT
Start: 2023-01-06 | End: 2023-01-09 | Stop reason: HOSPADM

## 2023-01-06 RX ORDER — SODIUM CHLORIDE, SODIUM LACTATE, POTASSIUM CHLORIDE, CALCIUM CHLORIDE 600; 310; 30; 20 MG/100ML; MG/100ML; MG/100ML; MG/100ML
INJECTION, SOLUTION INTRAVENOUS CONTINUOUS PRN
Status: DISCONTINUED | OUTPATIENT
Start: 2023-01-06 | End: 2023-01-06

## 2023-01-06 RX ORDER — OXYCODONE HYDROCHLORIDE 5 MG/1
5 TABLET ORAL EVERY 4 HOURS PRN
Status: DISCONTINUED | OUTPATIENT
Start: 2023-01-07 | End: 2023-01-09 | Stop reason: HOSPADM

## 2023-01-06 RX ORDER — OXYCODONE HYDROCHLORIDE 10 MG/1
10 TABLET ORAL EVERY 4 HOURS PRN
Status: DISCONTINUED | OUTPATIENT
Start: 2023-01-07 | End: 2023-01-09 | Stop reason: HOSPADM

## 2023-01-06 RX ORDER — PROMETHAZINE HYDROCHLORIDE 25 MG/ML
12.5 INJECTION, SOLUTION INTRAMUSCULAR; INTRAVENOUS EVERY 6 HOURS PRN
Status: ACTIVE | OUTPATIENT
Start: 2023-01-06 | End: 2023-01-08

## 2023-01-06 RX ORDER — ALBUTEROL SULFATE 2.5 MG/3ML
2.5 SOLUTION RESPIRATORY (INHALATION) ONCE AS NEEDED
Status: DISCONTINUED | OUTPATIENT
Start: 2023-01-06 | End: 2023-01-09 | Stop reason: HOSPADM

## 2023-01-06 RX ORDER — DOCUSATE SODIUM 100 MG/1
100 CAPSULE, LIQUID FILLED ORAL 2 TIMES DAILY
Status: DISCONTINUED | OUTPATIENT
Start: 2023-01-06 | End: 2023-01-09 | Stop reason: HOSPADM

## 2023-01-06 RX ORDER — HYDROMORPHONE HCL/PF 1 MG/ML
0.5 SYRINGE (ML) INJECTION EVERY 2 HOUR PRN
Status: ACTIVE | OUTPATIENT
Start: 2023-01-06 | End: 2023-01-07

## 2023-01-06 RX ORDER — IBUPROFEN 600 MG/1
600 TABLET ORAL EVERY 6 HOURS
Status: DISCONTINUED | OUTPATIENT
Start: 2023-01-08 | End: 2023-01-09 | Stop reason: HOSPADM

## 2023-01-06 RX ORDER — DIPHENHYDRAMINE HYDROCHLORIDE 50 MG/ML
INJECTION INTRAMUSCULAR; INTRAVENOUS AS NEEDED
Status: DISCONTINUED | OUTPATIENT
Start: 2023-01-06 | End: 2023-01-06

## 2023-01-06 RX ORDER — NALOXONE HYDROCHLORIDE 0.4 MG/ML
0.1 INJECTION, SOLUTION INTRAMUSCULAR; INTRAVENOUS; SUBCUTANEOUS
Status: ACTIVE | OUTPATIENT
Start: 2023-01-06 | End: 2023-01-07

## 2023-01-06 RX ORDER — ONDANSETRON 2 MG/ML
4 INJECTION INTRAMUSCULAR; INTRAVENOUS ONCE AS NEEDED
Status: DISCONTINUED | OUTPATIENT
Start: 2023-01-06 | End: 2023-01-09 | Stop reason: HOSPADM

## 2023-01-06 RX ORDER — ONDANSETRON 2 MG/ML
4 INJECTION INTRAMUSCULAR; INTRAVENOUS EVERY 8 HOURS PRN
Status: DISCONTINUED | OUTPATIENT
Start: 2023-01-06 | End: 2023-01-06

## 2023-01-06 RX ORDER — OXYTOCIN/RINGER'S LACTATE 30/500 ML
62.5 PLASTIC BAG, INJECTION (ML) INTRAVENOUS CONTINUOUS
Status: ACTIVE | OUTPATIENT
Start: 2023-01-06 | End: 2023-01-07

## 2023-01-06 RX ORDER — ACETAMINOPHEN 325 MG/1
650 TABLET ORAL EVERY 6 HOURS SCHEDULED
Status: DISCONTINUED | OUTPATIENT
Start: 2023-01-06 | End: 2023-01-09 | Stop reason: HOSPADM

## 2023-01-06 RX ORDER — METOCLOPRAMIDE HYDROCHLORIDE 5 MG/ML
INJECTION INTRAMUSCULAR; INTRAVENOUS AS NEEDED
Status: DISCONTINUED | OUTPATIENT
Start: 2023-01-06 | End: 2023-01-06

## 2023-01-06 RX ORDER — DIPHENHYDRAMINE HCL 25 MG
25 TABLET ORAL EVERY 6 HOURS PRN
Status: DISCONTINUED | OUTPATIENT
Start: 2023-01-06 | End: 2023-01-09 | Stop reason: HOSPADM

## 2023-01-06 RX ORDER — DEXAMETHASONE SODIUM PHOSPHATE 10 MG/ML
INJECTION, SOLUTION INTRAMUSCULAR; INTRAVENOUS AS NEEDED
Status: DISCONTINUED | OUTPATIENT
Start: 2023-01-06 | End: 2023-01-06

## 2023-01-06 RX ORDER — MORPHINE SULFATE 1 MG/ML
INJECTION, SOLUTION EPIDURAL; INTRATHECAL; INTRAVENOUS AS NEEDED
Status: DISCONTINUED | OUTPATIENT
Start: 2023-01-06 | End: 2023-01-06

## 2023-01-06 RX ORDER — OXYTOCIN/RINGER'S LACTATE 30/500 ML
PLASTIC BAG, INJECTION (ML) INTRAVENOUS AS NEEDED
Status: DISCONTINUED | OUTPATIENT
Start: 2023-01-06 | End: 2023-01-06

## 2023-01-06 RX ORDER — FENTANYL CITRATE 50 UG/ML
INJECTION, SOLUTION INTRAMUSCULAR; INTRAVENOUS AS NEEDED
Status: DISCONTINUED | OUTPATIENT
Start: 2023-01-06 | End: 2023-01-06

## 2023-01-06 RX ORDER — KETOROLAC TROMETHAMINE 30 MG/ML
30 INJECTION, SOLUTION INTRAMUSCULAR; INTRAVENOUS EVERY 6 HOURS
Status: DISCONTINUED | OUTPATIENT
Start: 2023-01-06 | End: 2023-01-06

## 2023-01-06 RX ORDER — CALCIUM CARBONATE 200(500)MG
1000 TABLET,CHEWABLE ORAL DAILY PRN
Status: DISCONTINUED | OUTPATIENT
Start: 2023-01-06 | End: 2023-01-09 | Stop reason: HOSPADM

## 2023-01-06 RX ORDER — KETOROLAC TROMETHAMINE 30 MG/ML
INJECTION, SOLUTION INTRAMUSCULAR; INTRAVENOUS AS NEEDED
Status: DISCONTINUED | OUTPATIENT
Start: 2023-01-06 | End: 2023-01-06

## 2023-01-06 RX ORDER — CEFAZOLIN SODIUM 2 G/50ML
2000 SOLUTION INTRAVENOUS ONCE
Status: COMPLETED | OUTPATIENT
Start: 2023-01-06 | End: 2023-01-06

## 2023-01-06 RX ORDER — ONDANSETRON 2 MG/ML
INJECTION INTRAMUSCULAR; INTRAVENOUS AS NEEDED
Status: DISCONTINUED | OUTPATIENT
Start: 2023-01-06 | End: 2023-01-06

## 2023-01-06 RX ORDER — KETOROLAC TROMETHAMINE 30 MG/ML
30 INJECTION, SOLUTION INTRAMUSCULAR; INTRAVENOUS EVERY 6 HOURS SCHEDULED
Status: COMPLETED | OUTPATIENT
Start: 2023-01-06 | End: 2023-01-08

## 2023-01-06 RX ORDER — SODIUM CHLORIDE, SODIUM LACTATE, POTASSIUM CHLORIDE, CALCIUM CHLORIDE 600; 310; 30; 20 MG/100ML; MG/100ML; MG/100ML; MG/100ML
125 INJECTION, SOLUTION INTRAVENOUS CONTINUOUS
Status: DISCONTINUED | OUTPATIENT
Start: 2023-01-06 | End: 2023-01-09 | Stop reason: HOSPADM

## 2023-01-06 RX ORDER — NALBUPHINE HCL 10 MG/ML
10 AMPUL (ML) INJECTION
Status: DISCONTINUED | OUTPATIENT
Start: 2023-01-06 | End: 2023-01-09 | Stop reason: HOSPADM

## 2023-01-06 RX ORDER — SODIUM CHLORIDE, SODIUM LACTATE, POTASSIUM CHLORIDE, CALCIUM CHLORIDE 600; 310; 30; 20 MG/100ML; MG/100ML; MG/100ML; MG/100ML
125 INJECTION, SOLUTION INTRAVENOUS CONTINUOUS
Status: DISCONTINUED | OUTPATIENT
Start: 2023-01-06 | End: 2023-01-06

## 2023-01-06 RX ORDER — ONDANSETRON 2 MG/ML
4 INJECTION INTRAMUSCULAR; INTRAVENOUS EVERY 8 HOURS PRN
Status: DISCONTINUED | OUTPATIENT
Start: 2023-01-06 | End: 2023-01-09 | Stop reason: HOSPADM

## 2023-01-06 RX ADMIN — ACETAMINOPHEN 650 MG: 325 TABLET, FILM COATED ORAL at 20:14

## 2023-01-06 RX ADMIN — KETOROLAC TROMETHAMINE 30 MG: 30 INJECTION, SOLUTION INTRAMUSCULAR at 16:43

## 2023-01-06 RX ADMIN — KETOROLAC TROMETHAMINE 30 MG: 30 INJECTION, SOLUTION INTRAMUSCULAR; INTRAVENOUS at 20:13

## 2023-01-06 RX ADMIN — SODIUM CHLORIDE, SODIUM LACTATE, POTASSIUM CHLORIDE, AND CALCIUM CHLORIDE: .6; .31; .03; .02 INJECTION, SOLUTION INTRAVENOUS at 15:55

## 2023-01-06 RX ADMIN — DOCUSATE SODIUM 100 MG: 100 CAPSULE, LIQUID FILLED ORAL at 20:14

## 2023-01-06 RX ADMIN — DEXAMETHASONE SODIUM PHOSPHATE 10 MG: 10 INJECTION, SOLUTION INTRAMUSCULAR; INTRAVENOUS at 15:31

## 2023-01-06 RX ADMIN — DIPHENHYDRAMINE HYDROCHLORIDE 25 MG: 50 INJECTION, SOLUTION INTRAMUSCULAR; INTRAVENOUS at 16:17

## 2023-01-06 RX ADMIN — SODIUM CHLORIDE, SODIUM LACTATE, POTASSIUM CHLORIDE, AND CALCIUM CHLORIDE 125 ML/HR: .6; .31; .03; .02 INJECTION, SOLUTION INTRAVENOUS at 12:15

## 2023-01-06 RX ADMIN — ONDANSETRON 4 MG: 2 INJECTION INTRAMUSCULAR; INTRAVENOUS at 15:31

## 2023-01-06 RX ADMIN — METOCLOPRAMIDE 10 MG: 5 INJECTION, SOLUTION INTRAMUSCULAR; INTRAVENOUS at 16:18

## 2023-01-06 RX ADMIN — MORPHINE SULFATE 0.15 MG: 1 INJECTION, SOLUTION EPIDURAL; INTRATHECAL; INTRAVENOUS at 15:28

## 2023-01-06 RX ADMIN — Medication 30 UNITS: at 15:50

## 2023-01-06 RX ADMIN — LEVONORGESTREL 1 INTRA UTERINE DEVICE: 52 INTRAUTERINE DEVICE INTRAUTERINE at 15:23

## 2023-01-06 RX ADMIN — SODIUM CHLORIDE, SODIUM LACTATE, POTASSIUM CHLORIDE, AND CALCIUM CHLORIDE 1000 ML: .6; .31; .03; .02 INJECTION, SOLUTION INTRAVENOUS at 11:46

## 2023-01-06 RX ADMIN — SODIUM CHLORIDE, SODIUM LACTATE, POTASSIUM CHLORIDE, AND CALCIUM CHLORIDE 125 ML/HR: .6; .31; .03; .02 INJECTION, SOLUTION INTRAVENOUS at 20:14

## 2023-01-06 RX ADMIN — SODIUM CHLORIDE, SODIUM LACTATE, POTASSIUM CHLORIDE, AND CALCIUM CHLORIDE: .6; .31; .03; .02 INJECTION, SOLUTION INTRAVENOUS at 15:15

## 2023-01-06 RX ADMIN — FENTANYL CITRATE 10 MCG: 50 INJECTION, SOLUTION INTRAMUSCULAR; INTRAVENOUS at 15:28

## 2023-01-06 RX ADMIN — PHENYLEPHRINE HYDROCHLORIDE 100 MCG/MIN: 50 INJECTION INTRAVENOUS at 15:28

## 2023-01-06 RX ADMIN — CEFAZOLIN SODIUM 2000 MG: 2 SOLUTION INTRAVENOUS at 15:30

## 2023-01-06 RX ADMIN — ONDANSETRON 4 MG: 2 INJECTION INTRAMUSCULAR; INTRAVENOUS at 16:17

## 2023-01-06 NOTE — INTERIM OP NOTE
SECTION () REPEAT, INSERTION OF INTRAUTERINE DEVICE (IUD)  Postoperative Note  PATIENT NAME: Orlando Schumacher  : 1990  MRN: [id number]  [facility] L&D OR ROOM 02    Surgery Date: 2023    Preop Diagnosis:  Previous  section complicating pregnancy [Q45 512]    Post-Op Diagnosis Codes:     * Previous  section complicating pregnancy [J71 895]    Procedure(s) (LRB):   SECTION () REPEAT (N/A)  INSERTION OF INTRAUTERINE DEVICE (IUD) (N/A)    Surgeon(s) and Role:     * Kristina Knight MD - Primary     * Lucas Zaidi MD - Assisting    Specimens:  ID Type Source Tests Collected by Time Destination   A :  Tissue (Placenta on Hold) OB Only Placenta PLACENTA IN STORAGE Lucas Zaidi MD 2023 1556    CORD :  Cord Blood Cord BLOOD GAS, VENOUS, CORD, BLOOD GAS, ARTERIAL, CORD Lucas Zaidi MD 2023 1551        Estimated Blood Loss:   999    Anesthesia Type:   Spinal    Findings:   Viable male  with spontaneous cry, good tone, and good color  Dense adhesions from anterior abdominal wall to uterus  Normal tubes and ovaries  Mirena IUD placed      Complications:   None      SIGNATURE: Kristina Knight MD   DATE: 2023   TIME: 4:36 PM

## 2023-01-06 NOTE — DISCHARGE SUMMARY
Discharge Summary - OB/GYN   Leida Vieyra 28 y o  female MRN: [id number]  Unit/Bed#: LD PACU- Encounter: [id number]      Admission Date: 2023     Discharge Date: 23    Admitting Diagnosis:   1  Pregnancy at 39w0d  2  Hx marijuana use  3  Isolated proteinuria  4  Iron deficiency anemia    Discharge Diagnosis:   Same, delivered      Procedures:  Repeat  section, low transverse incision  Post-placental Mirena IUD insertion    Admitting Attending: Sami Toribio MD    Delivering Attending: Dr Burke Piedra    Discharging Attending: Dr Thomas Roberts Course:   Leida Vieyra is a 28 y o  [id number] at 39w0d wks who was initially admitted for scheduled RLTCS and post-placental Mirena IUD insertion  She delivered a viable male  on 23 at 65  Weight 7lbs 11oz via repeat  section, low transverse incision  Apgars were 9 (1 min) and 9 (5 min)   was transferred to  nursery  Patient tolerated the procedure well and was transferred to recovery in stable condition  Her post-operative course was uncomplicated  Preoperative hemoglobin was 11 9, postoperative was 9 7  Her postoperative pain was well controlled with oral analgesics  On day of discharge, she was ambulating and able to reasonably perform all ADLs  She was voiding and had appropriate bowel function  Pain was well controlled  She was discharged home on post-operative day #3 without complications  Patient was instructed to follow up with her OB as an outpatient and was given appropriate warnings to call provider if she develops signs of infection or uncontrolled pain  Complications: none apparent    Condition at discharge: good     Discharge instructions/Information to patient and family:   See after visit summary for information provided to patient and family        Provisions for Follow-Up Care:  See after visit summary for information related to follow-up care and any pertinent home health orders  Disposition: Home    Planned Readmission: No    Discharge Medications: For a complete list of the patient's medications, please refer to her med rec

## 2023-01-06 NOTE — ASSESSMENT & PLAN NOTE
Used marijuana prior to pregnancy, stopped once pregnant  Admission UDS negative    · CM consult postpartum

## 2023-01-06 NOTE — ANESTHESIA PROCEDURE NOTES
Spinal Block    Patient location during procedure: OB  Start time: 1/6/2023 3:28 PM  Reason for block: procedure for pain and at surgeon's request  Staffing  Performed: CRNA   Anesthesiologist: Niko Spence MD  Resident/CRNA: Celsa Jean Baptiste CRNA  Preanesthetic Checklist  Completed: patient identified, IV checked, site marked, risks and benefits discussed, surgical consent, monitors and equipment checked, pre-op evaluation and timeout performed  Spinal Block  Patient position: sitting  Prep: ChloraPrep  Patient monitoring: cardiac monitor and frequent blood pressure checks  Approach: midline  Location: L3-4  Injection technique: single-shot  Needle  Needle type: pencil-tip   Needle gauge: 25 G  Needle length: 10 cm  Assessment  Sensory level: T4  Events: cerebrospinal fluid  Injection Assessment:  negative aspiration for heme, no paresthesia on injection and positive aspiration for clear CSF    Post-procedure:  adhesive bandage applied, pressure dressing applied, secured with tape, site cleaned and sterile dressing applied

## 2023-01-06 NOTE — ANESTHESIA POSTPROCEDURE EVALUATION
Post-Op Assessment Note    CV Status:  Stable  Pain Score: 0    Pain management: adequate     Mental Status:  Alert   Hydration Status:  Stable   PONV Controlled:  None   Airway Patency:  Patent      Post Op Vitals Reviewed: Yes      Staff: Anesthesiologist, CRNA         No notable events documented      BP   104/61   Temp 98   Pulse 86   Resp 16   SpO2 99 RA

## 2023-01-06 NOTE — ANESTHESIA PREPROCEDURE EVALUATION
Procedure:   SECTION () REPEAT (Uterus)    Relevant Problems   GYN   (+) 38 weeks gestation of pregnancy      HEMATOLOGY   (+) Iron deficiency anemia        Physical Exam    Airway    Mallampati score: II  TM Distance: >3 FB  Neck ROM: full     Dental   No notable dental hx     Cardiovascular  Cardiovascular exam normal    Pulmonary  Pulmonary exam normal     Other Findings        Anesthesia Plan  ASA Score- 2     Anesthesia Type- spinal with ASA Monitors  Additional Monitors:   Airway Plan:           Plan Factors-Exercise tolerance (METS): >4 METS  Chart reviewed  EKG reviewed  Imaging results reviewed  Existing labs reviewed  Patient summary reviewed  Patient is not a current smoker  Patient not instructed to abstain from smoking on day of procedure  Patient did not smoke on day of surgery  Induction-     Postoperative Plan- Plan for postoperative opioid use  Informed Consent- Anesthetic plan and risks discussed with patient  I personally reviewed this patient with the CRNA  Discussed and agreed on the Anesthesia Plan with the CRNA  Cristel Bhatti

## 2023-01-06 NOTE — OP NOTE
OPERATIVE REPORT  PATIENT NAME: Umair Jacobson    :  1990  MRN: [id number]  Pt Location: [facility] L&D OR ROOM 02    SURGERY DATE: 2023    Surgeon(s) and Role:     * Chance Ann MD - Primary     * Amanda Crowe MD - Assisting    Preop Diagnosis:  Previous  section complicating pregnancy [B42 041]    Post-Op Diagnosis Codes:     * Previous  section complicating pregnancy [U81 737]    Procedure(s) (LRB):   SECTION () REPEAT (N/A)  INSERTION OF INTRAUTERINE DEVICE (IUD) (N/A)    Specimen(s):  ID Type Source Tests Collected by Time Destination   A :  Tissue (Placenta on Hold) OB Only Placenta PLACENTA IN STORAGE Amanda Crowe MD 2023 1554    CORD :  Cord Blood Cord BLOOD GAS, VENOUS, CORD, BLOOD GAS, ARTERIAL, CORD Amanda Crowe MD 2023 1551        Quantitative Blood Loss:   920 mL    Drains:  Urethral Catheter Double-lumen;Non-latex 16 Fr  (Active)   Reasons to continue Urinary Catheter  Post-operative urological requirements 23 1650   Goal for Removal Remove POD#1 23 1650   Site Assessment Clean;Skin intact 23 1650   Masters Care Done 23 1530   Collection Container Standard drainage bag 23 1750   Number of days: 0       Anesthesia Type:   Epidural    Operative Indications:  Previous  section complicating pregnancy [Q37 473]    Operative Findings:  1  Viable male  at 65 with APGARs of 9 and 9 at 1 and 5 minutes  Fetus weight: 7lb 11oz  2  Normal intact placenta with eccentrally inserted 3 vessel cord expressed at 1554  3  Normal uterus, bilateral tubes, and ovaries    4  Blood gases:  Recent Results (from the past 3 hour(s))   CORD, Blood gas, venous    Collection Time: 23  3:51 PM   Result Value Ref Range    pH, Cord Keyur 7 323 7 190 - 7 490    pCO2, Cord Keyur 42 2 27 0 - 43 0 mm HG    pO2, Cord Keyur 31 7 15 0 - 45 0 mm HG    HCO3, Cord Keyur 21 4 12 2 - 28 6 mmol/L    Base Exc, Cord Keyur -4 5 (L) 1 0 - 9 0 mmol/L O2 Cont, Cord Keyur 15 8 mL/dL    O2 HGB,VENOUS CORD 72 0 %   CORD, Blood gas, arterial    Collection Time: 01/06/23  3:51 PM   Result Value Ref Range    pH, Cord Art 7 322 7 230 - 7 430    pCO2, Cord Art 40 8 30 0 - 60 0    pO2, Cord Art 21 6 5 0 - 25 0 mm HG    HCO3, Cord Art 20 6 17 3 - 27 3 mmol/L    Base Exc, Cord Art -5 1 (L) 3 0 - 11 0 mmol/L    O2 Content, Cord Art 11 0 ml/dl    O2 Hgb, Arterial Cord 48 8 %       Procedure and technique:  The patient was taken to the operating room  Spinal anesthesia was adequately established and Ancef was given for preoperative prophylaxis  The patient was then placed in the dorsal supine position with a left tilt of the hips  The patient was prepped with chlorhexidine for vaginal prep and chloraprep for abdominal prep and draped in the usual sterile fashion  A time out was performed to confirm correct patient and correct procedure  An incision was made in the skin with a surgical scalpel, and sharp dissection was carried out over subsequent layers of tissue including the fascia, followed by the Bovie electrocautery for hemostasis  The fascia was incised bilaterally of the midline, and the fascial incision was extended bilaterally using the curved Puga scissors  The superior edge of the fascial incision was grasped with Kocher clamps, tented up, and the underlying rectus muscles were dissected off bluntly and sharply using the surgical scalpel  The rectus muscles were then divided at midline, and the peritoneum was identified and then entered and extended superiorly and inferiorly in blunt fashion  The Joshua retractor was inserted, and a transverse incision was made in the lower uterine segment using a new surgical blade  The uterine incision was extended cephalad and caudal using blunt dissection  The amniotic sac was entered and the amniotic fluid was noted to be clear  The surgeon's hand was placed into the uterine cavity   The fetal head was identified and elevated through the uterine incision with the assistance of fundal pressure  With gentle traction, the shoulder was delivered, followed by the rest of the fetal body  There was no nuchal cord noted  On delivery, the cord was doubly clamped and cut after delayed cord clamping  The infant was then passed off the table to the awaiting  staff  The  was noted to cry spontaneously and moved all extremities  Venous and arterial blood gas, cord blood, and portion of cord was obtained for analysis and routine blood testing  The placenta delivery was then sent to storage  Placenta was noted to be intact with a eccentrally inserted three-vessel cord  Oxytocin was administered by IV infusion to enhance uterine contraction  The uterus was kept in situ and cleared of all clots and remaining products of conception  The uterine incision was re-approximated using 0 Monocryl in a running locked fashion, and the Mirena IUD was placed at the uterine fundus when the hysterotomy was half-way re-approximated  A second horizontal imbricating stitch with 0 Monocryl was applied  The uterine incision was examined and noted to be hemostatic with the assistance of irrigation  The posterior cul-de-sac was cleared of all clots and products of conception  The uterine incision was once again re-examined and noted to be hemostatic, and Bea was placed to maintain hemostasis  The fascia was re-approximated using 0 Vicryl in a running nonlocked fashion  The subcutaneous tissue was irrigated and cleared of all clots and debris  Good hemostasis was noted with Bovie electrocautery  The subcutaneous tissue was re-approximated with 2-0 plain gut chromic in interrupted fashion  The skin incision was closed with 4-0 Monocryl in running fashion  Good hemostasis was noted  Patient tolerated the procedure well  All needle, sponge, and instrument counts were noted to be correct x2 at the end of the procedure   Patient was transferred to the recovery room in stable condition  Dr Jolene Robbins was present for the procedure      Complications:   None apparent    Patient Disposition:  PACU         SIGNATURE: Alejandra Miranda MD  DATE: January 6, 2023  TIME: 6:00 PM

## 2023-01-06 NOTE — ASSESSMENT & PLAN NOTE
mL, Hgb 11 9 --> 9 7  DVT ppx (BMI 29): SCDs   Contraception: Post placental Mirena IUD  Encourage breastfeeding  Encourage ambulation

## 2023-01-07 LAB
ERYTHROCYTE [DISTWIDTH] IN BLOOD BY AUTOMATED COUNT: 17.7 % (ref 11.6–15.1)
HCT VFR BLD AUTO: 29.7 % (ref 34.8–46.1)
HGB BLD-MCNC: 9.7 G/DL (ref 11.5–15.4)
MCH RBC QN AUTO: 29.6 PG (ref 26.8–34.3)
MCHC RBC AUTO-ENTMCNC: 32.7 G/DL (ref 31.4–37.4)
MCV RBC AUTO: 91 FL (ref 82–98)
PLATELET # BLD AUTO: 217 THOUSANDS/UL (ref 149–390)
PMV BLD AUTO: 10.8 FL (ref 8.9–12.7)
RBC # BLD AUTO: 3.28 MILLION/UL (ref 3.81–5.12)
WBC # BLD AUTO: 13.21 THOUSAND/UL (ref 4.31–10.16)

## 2023-01-07 RX ADMIN — DOCUSATE SODIUM 100 MG: 100 CAPSULE, LIQUID FILLED ORAL at 20:23

## 2023-01-07 RX ADMIN — ACETAMINOPHEN 650 MG: 325 TABLET, FILM COATED ORAL at 20:22

## 2023-01-07 RX ADMIN — ACETAMINOPHEN 650 MG: 325 TABLET, FILM COATED ORAL at 13:56

## 2023-01-07 RX ADMIN — KETOROLAC TROMETHAMINE 30 MG: 30 INJECTION, SOLUTION INTRAMUSCULAR; INTRAVENOUS at 01:49

## 2023-01-07 RX ADMIN — DOCUSATE SODIUM 100 MG: 100 CAPSULE, LIQUID FILLED ORAL at 08:31

## 2023-01-07 RX ADMIN — KETOROLAC TROMETHAMINE 30 MG: 30 INJECTION, SOLUTION INTRAMUSCULAR; INTRAVENOUS at 14:49

## 2023-01-07 RX ADMIN — ACETAMINOPHEN 650 MG: 325 TABLET, FILM COATED ORAL at 01:48

## 2023-01-07 RX ADMIN — ACETAMINOPHEN 650 MG: 325 TABLET, FILM COATED ORAL at 08:31

## 2023-01-07 RX ADMIN — KETOROLAC TROMETHAMINE 30 MG: 30 INJECTION, SOLUTION INTRAMUSCULAR; INTRAVENOUS at 20:23

## 2023-01-07 RX ADMIN — KETOROLAC TROMETHAMINE 30 MG: 30 INJECTION, SOLUTION INTRAMUSCULAR; INTRAVENOUS at 08:32

## 2023-01-07 NOTE — PLAN OF CARE
Problem: PAIN - ADULT  Goal: Verbalizes/displays adequate comfort level or baseline comfort level  Description: Interventions:  - Encourage patient to monitor pain and request assistance  - Assess pain using appropriate pain scale  - Administer analgesics based on type and severity of pain and evaluate response  - Implement non-pharmacological measures as appropriate and evaluate response  - Consider cultural and social influences on pain and pain management  - Notify physician/advanced practitioner if interventions unsuccessful or patient reports new pain  Outcome: Progressing     Problem: INFECTION - ADULT  Goal: Absence or prevention of progression during hospitalization  Description: INTERVENTIONS:  - Assess and monitor for signs and symptoms of infection  - Monitor lab/diagnostic results  - Monitor all insertion sites, i e  indwelling lines, tubes, and drains  - Monitor endotracheal if appropriate and nasal secretions for changes in amount and color  - [place] appropriate cooling/warming therapies per order  - Administer medications as ordered  - Instruct and encourage patient and family to use good hand hygiene technique  - Identify and instruct in appropriate isolation precautions for identified infection/condition  Outcome: Progressing  Goal: Absence of fever/infection during neutropenic period  Description: INTERVENTIONS:  - Monitor WBC    Outcome: Progressing     Problem: SAFETY ADULT  Goal: Patient will remain free of falls  Description: INTERVENTIONS:  - Educate patient/family on patient safety including physical limitations  - Instruct patient to call for assistance with activity   - Consult OT/PT to assist with strengthening/mobility   - Keep Call bell within reach  - Keep bed low and locked with side rails adjusted as appropriate  - Keep care items and personal belongings within reach  - Initiate and maintain comfort rounds  - Make Fall Risk Sign visible to staff  - Apply yellow socks and bracelet for high fall risk patients  - Consider moving patient to room near nurses station  Outcome: Progressing  Goal: Maintain or return to baseline ADL function  Description: INTERVENTIONS:  -  Assess patient's ability to carry out ADLs; assess patient's baseline for ADL function and identify physical deficits which impact ability to perform ADLs (bathing, care of mouth/teeth, toileting, grooming, dressing, etc )  - Assess/evaluate cause of self-care deficits   - Assess range of motion  - Assess patient's mobility; develop plan if impaired  - Assess patient's need for assistive devices and provide as appropriate  - Encourage maximum independence but intervene and supervise when necessary  - Involve family in performance of ADLs  - Assess for home care needs following discharge   - Consider OT consult to assist with ADL evaluation and planning for discharge  - Provide patient education as appropriate  Outcome: Progressing  Goal: Maintains/Returns to pre admission functional level  Description: INTERVENTIONS:  - Perform BMAT or MOVE assessment daily    - Set and communicate daily mobility goal to care team and patient/family/caregiver  - Collaborate with rehabilitation services on mobility goals if consulted  - Out of bed for toileting  - Record patient progress and toleration of activity level   Outcome: Progressing     Problem: Knowledge Deficit  Goal: Patient/family/caregiver demonstrates understanding of disease process, treatment plan, medications, and discharge instructions  Description: Complete learning assessment and assess knowledge base    Interventions:  - Provide teaching at level of understanding  - Provide teaching via preferred learning methods  Outcome: Progressing     Problem: DISCHARGE PLANNING  Goal: Discharge to home or other facility with appropriate resources  Description: INTERVENTIONS:  - Identify barriers to discharge w/patient and caregiver  - Arrange for needed discharge resources and transportation as appropriate  - Identify discharge learning needs (meds, wound care, etc )  - Arrange for interpretive services to assist at discharge as needed  - Refer to Case Management Department for coordinating discharge planning if the patient needs post-hospital services based on physician/advanced practitioner order or complex needs related to functional status, cognitive ability, or social support system  Outcome: Progressing

## 2023-01-07 NOTE — PLAN OF CARE
Problem: PAIN - ADULT  Goal: Verbalizes/displays adequate comfort level or baseline comfort level  Description: Interventions:  - Encourage patient to monitor pain and request assistance  - Assess pain using appropriate pain scale  - Administer analgesics based on type and severity of pain and evaluate response  - Implement non-pharmacological measures as appropriate and evaluate response  - Consider cultural and social influences on pain and pain management  - Notify physician/advanced practitioner if interventions unsuccessful or patient reports new pain  Outcome: Progressing     Problem: INFECTION - ADULT  Goal: Absence or prevention of progression during hospitalization  Description: INTERVENTIONS:  - Assess and monitor for signs and symptoms of infection  - Monitor lab/diagnostic results  - Monitor all insertion sites, i e  indwelling lines, tubes, and drains  - Monitor endotracheal if appropriate and nasal secretions for changes in amount and color  - [place] appropriate cooling/warming therapies per order  - Administer medications as ordered  - Instruct and encourage patient and family to use good hand hygiene technique  - Identify and instruct in appropriate isolation precautions for identified infection/condition  Outcome: Progressing  Goal: Absence of fever/infection during neutropenic period  Description: INTERVENTIONS:  - Monitor WBC    Outcome: Progressing     Problem: SAFETY ADULT  Goal: Patient will remain free of falls  Description: INTERVENTIONS:  - Educate patient/family on patient safety including physical limitations  - Instruct patient to call for assistance with activity   - Consult OT/PT to assist with strengthening/mobility   - Keep Call bell within reach  - Keep bed low and locked with side rails adjusted as appropriate  - Keep care items and personal belongings within reach  - Initiate and maintain comfort rounds  - Make Fall Risk Sign visible to staff  - Apply yellow socks and bracelet for high fall risk patients  - Consider moving patient to room near nurses station  Outcome: Progressing  Goal: Maintain or return to baseline ADL function  Description: INTERVENTIONS:  -  Assess patient's ability to carry out ADLs; assess patient's baseline for ADL function and identify physical deficits which impact ability to perform ADLs (bathing, care of mouth/teeth, toileting, grooming, dressing, etc )  - Assess/evaluate cause of self-care deficits   - Assess range of motion  - Assess patient's mobility; develop plan if impaired  - Assess patient's need for assistive devices and provide as appropriate  - Encourage maximum independence but intervene and supervise when necessary  - Involve family in performance of ADLs  - Assess for home care needs following discharge   - Consider OT consult to assist with ADL evaluation and planning for discharge  - Provide patient education as appropriate  Outcome: Progressing  Goal: Maintains/Returns to pre admission functional level  Description: INTERVENTIONS:  - Perform BMAT or MOVE assessment daily    - Set and communicate daily mobility goal to care team and patient/family/caregiver  - Collaborate with rehabilitation services on mobility goals if consulted  - Out of bed for toileting  - Record patient progress and toleration of activity level   Outcome: Progressing     Problem: Knowledge Deficit  Goal: Patient/family/caregiver demonstrates understanding of disease process, treatment plan, medications, and discharge instructions  Description: Complete learning assessment and assess knowledge base    Interventions:  - Provide teaching at level of understanding  - Provide teaching via preferred learning methods  Outcome: Progressing     Problem: DISCHARGE PLANNING  Goal: Discharge to home or other facility with appropriate resources  Description: INTERVENTIONS:  - Identify barriers to discharge w/patient and caregiver  - Arrange for needed discharge resources and transportation as appropriate  - Identify discharge learning needs (meds, wound care, etc )  - Arrange for interpretive services to assist at discharge as needed  - Refer to Case Management Department for coordinating discharge planning if the patient needs post-hospital services based on physician/advanced practitioner order or complex needs related to functional status, cognitive ability, or social support system  Outcome: Progressing

## 2023-01-07 NOTE — PLAN OF CARE
Problem: PAIN - ADULT  Goal: Verbalizes/displays adequate comfort level or baseline comfort level  Description: Interventions:  - Encourage patient to monitor pain and request assistance  - Assess pain using appropriate pain scale  - Administer analgesics based on type and severity of pain and evaluate response  - Implement non-pharmacological measures as appropriate and evaluate response  - Consider cultural and social influences on pain and pain management  - Notify physician/advanced practitioner if interventions unsuccessful or patient reports new pain  Outcome: Progressing     Problem: INFECTION - ADULT  Goal: Absence or prevention of progression during hospitalization  Description: INTERVENTIONS:  - Assess and monitor for signs and symptoms of infection  - Monitor lab/diagnostic results  - Monitor all insertion sites, i e  indwelling lines, tubes, and drains  - Monitor endotracheal if appropriate and nasal secretions for changes in amount and color  - [place] appropriate cooling/warming therapies per order  - Administer medications as ordered  - Instruct and encourage patient and family to use good hand hygiene technique  - Identify and instruct in appropriate isolation precautions for identified infection/condition  Outcome: Progressing  Goal: Absence of fever/infection during neutropenic period  Description: INTERVENTIONS:  - Monitor WBC    Outcome: Progressing     Problem: SAFETY ADULT  Goal: Patient will remain free of falls  Description: INTERVENTIONS:  - Educate patient/family on patient safety including physical limitations  - Instruct patient to call for assistance with activity   - Consult OT/PT to assist with strengthening/mobility   - Keep Call bell within reach  - Keep bed low and locked with side rails adjusted as appropriate  - Keep care items and personal belongings within reach  - Initiate and maintain comfort rounds  - Make Fall Risk Sign visible to staff  - Offer Toileting every  Hours, in advance of need  - Initiate/Maintain alarm  - Obtain necessary fall risk management equipment:  - Apply yellow socks and bracelet for high fall risk patients  - Consider moving patient to room near nurses station  Outcome: Progressing  Goal: Maintain or return to baseline ADL function  Description: INTERVENTIONS:  -  Assess patient's ability to carry out ADLs; assess patient's baseline for ADL function and identify physical deficits which impact ability to perform ADLs (bathing, care of mouth/teeth, toileting, grooming, dressing, etc )  - Assess/evaluate cause of self-care deficits   - Assess range of motion  - Assess patient's mobility; develop plan if impaired  - Assess patient's need for assistive devices and provide as appropriate  - Encourage maximum independence but intervene and supervise when necessary  - Involve family in performance of ADLs  - Assess for home care needs following discharge   - Consider OT consult to assist with ADL evaluation and planning for discharge  - Provide patient education as appropriate  Outcome: Progressing  Goal: Maintains/Returns to pre admission functional level  Description: INTERVENTIONS:  - Perform BMAT or MOVE assessment daily    - Set and communicate daily mobility goal to care team and patient/family/caregiver  - Collaborate with rehabilitation services on mobility goals if consulted  - Perform Range of Motion  times a day  - Reposition patient every  hours    - Dangle patient  times a day  - Stand patient  times a day  - Ambulate patient  times a day  - Out of bed to chair  times a day   - Out of bed for meals  times a day  - Out of bed for toileting  - Record patient progress and toleration of activity level   Outcome: Progressing     Problem: Knowledge Deficit  Goal: Patient/family/caregiver demonstrates understanding of disease process, treatment plan, medications, and discharge instructions  Description: Complete learning assessment and assess knowledge base   Interventions:  - Provide teaching at level of understanding  - Provide teaching via preferred learning methods  Outcome: Progressing     Problem: DISCHARGE PLANNING  Goal: Discharge to home or other facility with appropriate resources  Description: INTERVENTIONS:  - Identify barriers to discharge w/patient and caregiver  - Arrange for needed discharge resources and transportation as appropriate  - Identify discharge learning needs (meds, wound care, etc )  - Arrange for interpretive services to assist at discharge as needed  - Refer to Case Management Department for coordinating discharge planning if the patient needs post-hospital services based on physician/advanced practitioner order or complex needs related to functional status, cognitive ability, or social support system  Outcome: Progressing

## 2023-01-07 NOTE — CASE MANAGEMENT
Case Management Discharge Planning Note    Patient name Ashleigh Lakhani  Location /-67 MRN [id number]  : 1990 Date 2023       Current Admission Date: 2023  Current Admission Diagnosis:Status post repeat low transverse  section   Patient Active Problem List    Diagnosis Date Noted   • Isolated proteinuria 2023   • Decreased fetal movement 2022   • BV (bacterial vaginosis) 2022   • Status post repeat low transverse  section 2022   • Iron deficiency anemia 10/13/2022   • History of  delivery 2022   • Marijuana use disorder in remission 2022      LOS (days): 1  Geometric Mean LOS (GMLOS) (days):   Days to GMLOS:     OBJECTIVE:  Risk of Unplanned Readmission Score: 5 51         Current admission status: Inpatient   [facility] Pharmacy:   [facility] [place] [id number]  Phone: [phone] Fax: [address] [facility], [address] [place] [address]  Phone: [phone] Fax: [phone]    Primary Care Provider: Miquel Denver, MD    Primary Insurance: [address] [place] [address]  Secondary Insurance:     DISCHARGE DETAILS:  MOB UDS screen negative  Baby Boy UDS screen negative  MOB cleared to discharge home with Baby Boy  No other CM needs noted

## 2023-01-07 NOTE — PROGRESS NOTES
Progress Note - OB/GYN  Nani Blackmon 28 y o  female MRN: [id number]  Unit/Bed#: -01 Encounter: [id number]    Assessment and Plan     Denisse A Franne Schirmer is a patient of: [address] [place] [address]  She is PPD# 1 s/p RLTCS  Recovering well and is stable       * Status post repeat low transverse  section  Assessment & Plan   mL, Hgb 11 9 --> 9 7  DVT ppx (BMI 29): SCDs   Masters removed for void trial    Isolated proteinuria  Assessment & Plan  No elevated BP this pregnancy  Urine P:C 0 34  Iron deficiency anemia  Assessment & Plan  Continue PO iron, Colace, and MiraLax  Ferritin 6  Marijuana use disorder in remission  Assessment & Plan  Used marijuana prior to pregnancy, stopped once pregnant  Admission UDS negative  · CM consult postpartum      Disposition    - Anticipate discharge home on POD# 3-4      Subjective/Objective     Chief Complaint: Postpartum State     Subjective:    Nani Blackmon is PPD/POD#1 s/p RLTCS  She has no current complaints  Pain is well controlled  Patient is currently voiding  She is ambulating  Patient is currently passing flatus and has had no bowel movement  She is tolerating PO, and denies nausea or vomitting  Patient denies fever, chills, chest pain, shortness of breath, or calf tenderness  Lochia is minimal  She is  Breastfeeding  She is recovering well and is stable  Vitals:   /67 (BP Location: Left arm)   Pulse 78   Temp 97 6 °F (36 4 °C) (Oral)   Resp 20   Ht 5' 6" (1 676 m)   Wt 83 6 kg (184 lb 3 9 oz)   LMP 2022 (Exact Date)   SpO2 96%   Breastfeeding Yes   BMI 29 74 kg/m²       Intake/Output Summary (Last 24 hours) at 2023 0829  Last data filed at 2023 0501  Gross per 24 hour   Intake 3595 83 ml   Output 1970 ml   Net 1625 83 ml       Invasive Devices     Peripheral Intravenous Line  Duration           Peripheral IV 23 Distal;Dorsal (posterior); Left Forearm <1 day                Physical Exam: GEN: Wandy Almaraz appears well, alert and oriented x 3, pleasant and cooperative   CARDIO: RRR, no murmurs or rubs  RESP:  CTAB, no wheezes or rales  ABDOMEN: soft, no tenderness, no distention, fundus @ U-2, Incision C/D/I  EXTREMITIES: SCDs on, non tender, no erythema      Labs:     Hemoglobin   Date Value Ref Range Status   01/07/2023 9 7 (L) 11 5 - 15 4 g/dL Final   01/06/2023 11 9 11 5 - 15 4 g/dL Final     WBC   Date Value Ref Range Status   01/07/2023 13 21 (H) 4 31 - 10 16 Thousand/uL Final   01/06/2023 13 04 (H) 4 31 - 10 16 Thousand/uL Final     Platelets   Date Value Ref Range Status   01/07/2023 217 149 - 390 Thousands/uL Final   01/06/2023 218 149 - 390 Thousands/uL Final     Creatinine   Date Value Ref Range Status   12/31/2022 0 55 (L) 0 60 - 1 30 mg/dL Final     Comment:     Standardized to IDMS reference method   12/30/2022 0 65 0 60 - 1 30 mg/dL Final     Comment:     Standardized to IDMS reference method     AST   Date Value Ref Range Status   12/31/2022 16 13 - 39 U/L Final     Comment:     Specimen collection should occur prior to Sulfasalazine administration due to the potential for falsely depressed results  12/30/2022 16 13 - 39 U/L Final     Comment:     Specimen collection should occur prior to Sulfasalazine administration due to the potential for falsely depressed results  ALT   Date Value Ref Range Status   12/31/2022 11 7 - 52 U/L Final     Comment:     Specimen collection should occur prior to Sulfasalazine administration due to the potential for falsely depressed results  12/30/2022 10 7 - 52 U/L Final     Comment:     Specimen collection should occur prior to Sulfasalazine administration due to the potential for falsely depressed results             Nerissa Benítez MD  1/7/2023  8:29 AM

## 2023-01-08 RX ORDER — SIMETHICONE 80 MG
80 TABLET,CHEWABLE ORAL EVERY 6 HOURS PRN
Status: DISCONTINUED | OUTPATIENT
Start: 2023-01-08 | End: 2023-01-09 | Stop reason: HOSPADM

## 2023-01-08 RX ADMIN — IBUPROFEN 600 MG: 600 TABLET, FILM COATED ORAL at 09:09

## 2023-01-08 RX ADMIN — ACETAMINOPHEN 650 MG: 325 TABLET, FILM COATED ORAL at 15:55

## 2023-01-08 RX ADMIN — DOCUSATE SODIUM 100 MG: 100 CAPSULE, LIQUID FILLED ORAL at 17:56

## 2023-01-08 RX ADMIN — IBUPROFEN 600 MG: 600 TABLET, FILM COATED ORAL at 15:55

## 2023-01-08 RX ADMIN — SIMETHICONE 80 MG: 80 TABLET, CHEWABLE ORAL at 16:02

## 2023-01-08 RX ADMIN — KETOROLAC TROMETHAMINE 30 MG: 30 INJECTION, SOLUTION INTRAMUSCULAR; INTRAVENOUS at 02:11

## 2023-01-08 RX ADMIN — ACETAMINOPHEN 650 MG: 325 TABLET, FILM COATED ORAL at 21:37

## 2023-01-08 RX ADMIN — ACETAMINOPHEN 650 MG: 325 TABLET, FILM COATED ORAL at 09:09

## 2023-01-08 RX ADMIN — ACETAMINOPHEN 650 MG: 325 TABLET, FILM COATED ORAL at 02:11

## 2023-01-08 RX ADMIN — DOCUSATE SODIUM 100 MG: 100 CAPSULE, LIQUID FILLED ORAL at 09:09

## 2023-01-08 RX ADMIN — IBUPROFEN 600 MG: 600 TABLET, FILM COATED ORAL at 21:37

## 2023-01-08 NOTE — LACTATION NOTE
This note was copied from a baby's chart  CONSULT - LACTATION  Baby Boy (Angelina) Nidhi Bull 2 days male MRN: [id number]    [facility] Room / Bed: (N)/(N) Encounter: [id number]    Maternal Information     MOTHER:  Angelina Joaquin  Maternal Age: 28 y o    OB History: # 1 - Date: None, Sex: None, Weight: None, GA: None, Delivery: None, Apgar1: None, Apgar5: None, Living: None, Birth Comments: None    # 2 - Date: None, Sex: None, Weight: None, GA: None, Delivery: None, Apgar1: None, Apgar5: None, Living: None, Birth Comments: None    # 3 - Date: , Sex: None, Weight: None, GA: None, Delivery: , Low Transverse, Apgar1: None, Apgar5: None, Living: None, Birth Comments: None    # 4 - Date: , Sex: None, Weight: None, GA: None, Delivery: , Low Transverse, Apgar1: None, Apgar5: None, Living: None, Birth Comments: None    # 5 - Date: 23, Sex: Male, Weight: 3475 g (7 lb 10 6 oz), GA: 39w0d, Delivery: , Low Transverse, Apgar1: 9, Apgar5: 9, Living: Living, Birth Comments: None   Previouse breast reduction surgery? No    Lactation history:   Has patient previously breast fed: Yes   How long had patient previously breast fed: other 2 children for 1 week   Previous breast feeding complications: Lack of support     Past Surgical History:   Procedure Laterality Date   •  SECTION     • COSMETIC SURGERY      Pt states " [facility] "   • LIPOSUCTION          Birth information:  YOB: 2023   Time of birth: 3:50 PM   Sex: male   Delivery type: , Low Transverse   Birth Weight: 3475 g (7 lb 10 6 oz)   Percent of Weight Change: -4%     Gestational Age: 39w0d   [unfilled]    Assessment     Breast and nipple assessment: normal assessment      Assessment: normal assessment    Feeding assessment: Not Assessed  LATCH:  Latch:      Audible Swallowing:     Type of Nipple:     Comfort (Breast/Nipple):     Hold (Positioning):     LATCH Score:            Feeding recommendations:  breast feed on demand     Met with Angelina and provided her with the Ready Set Baby and the Discharge Breastfeeding Booklets  Angelina's intent is to Breast and Formula Feed  Discussed risks for early supplementation: over feeding, longer digestion times, engorgement for mom, lower milk supply for mom, and nipple confusion  Angelina was started pumping at her request  Encouraged her to place baby to the breast before supplementing and to  pump whenever baby is supplemented with formula, to protect/establish her milk supply  Encouraged her to call for breastfeeding support as needed          Jackie Burgos RN 1/8/2023 7:35 AM

## 2023-01-08 NOTE — PLAN OF CARE
Problem: PAIN - ADULT  Goal: Verbalizes/displays adequate comfort level or baseline comfort level  Description: Interventions:  - Encourage patient to monitor pain and request assistance  - Assess pain using appropriate pain scale  - Administer analgesics based on type and severity of pain and evaluate response  - Implement non-pharmacological measures as appropriate and evaluate response  - Consider cultural and social influences on pain and pain management  - Notify physician/advanced practitioner if interventions unsuccessful or patient reports new pain  Outcome: Progressing     Problem: INFECTION - ADULT  Goal: Absence or prevention of progression during hospitalization  Description: INTERVENTIONS:  - Assess and monitor for signs and symptoms of infection  - Monitor lab/diagnostic results  - Monitor all insertion sites, i e  indwelling lines, tubes, and drains  - Monitor endotracheal if appropriate and nasal secretions for changes in amount and color  - [place] appropriate cooling/warming therapies per order  - Administer medications as ordered  - Instruct and encourage patient and family to use good hand hygiene technique  - Identify and instruct in appropriate isolation precautions for identified infection/condition  Outcome: Progressing  Goal: Absence of fever/infection during neutropenic period  Description: INTERVENTIONS:  - Monitor WBC    Outcome: Progressing     Problem: SAFETY ADULT  Goal: Patient will remain free of falls  Description: INTERVENTIONS:  - Educate patient/family on patient safety including physical limitations  - Instruct patient to call for assistance with activity   - Consult OT/PT to assist with strengthening/mobility   - Keep Call bell within reach  - Keep bed low and locked with side rails adjusted as appropriate  - Keep care items and personal belongings within reach  - Initiate and maintain comfort rounds  - Make Fall Risk Sign visible to staff  - Offer Toileting every  Hours, in advance of need  - Initiate/Maintain alarm  - Obtain necessary fall risk management equipment:   - Apply yellow socks and bracelet for high fall risk patients  - Consider moving patient to room near nurses station  Outcome: Progressing  Goal: Maintain or return to baseline ADL function  Description: INTERVENTIONS:  -  Assess patient's ability to carry out ADLs; assess patient's baseline for ADL function and identify physical deficits which impact ability to perform ADLs (bathing, care of mouth/teeth, toileting, grooming, dressing, etc )  - Assess/evaluate cause of self-care deficits   - Assess range of motion  - Assess patient's mobility; develop plan if impaired  - Assess patient's need for assistive devices and provide as appropriate  - Encourage maximum independence but intervene and supervise when necessary  - Involve family in performance of ADLs  - Assess for home care needs following discharge   - Consider OT consult to assist with ADL evaluation and planning for discharge  - Provide patient education as appropriate  Outcome: Progressing  Goal: Maintains/Returns to pre admission functional level  Description: INTERVENTIONS:  - Perform BMAT or MOVE assessment daily    - Set and communicate daily mobility goal to care team and patient/family/caregiver  - Collaborate with rehabilitation services on mobility goals if consulted  - Perform Range of Motion  times a day  - Reposition patient every  hours    - Dangle patient  times a day  - Stand patient  times a day  - Ambulate patient  times a day  - Out of bed to chair  times a day   - Out of bed for meals  times a day  - Out of bed for toileting  - Record patient progress and toleration of activity level   Outcome: Progressing     Problem: Knowledge Deficit  Goal: Patient/family/caregiver demonstrates understanding of disease process, treatment plan, medications, and discharge instructions  Description: Complete learning assessment and assess knowledge base   Interventions:  - Provide teaching at level of understanding  - Provide teaching via preferred learning methods  Outcome: Progressing     Problem: DISCHARGE PLANNING  Goal: Discharge to home or other facility with appropriate resources  Description: INTERVENTIONS:  - Identify barriers to discharge w/patient and caregiver  - Arrange for needed discharge resources and transportation as appropriate  - Identify discharge learning needs (meds, wound care, etc )  - Arrange for interpretive services to assist at discharge as needed  - Refer to Case Management Department for coordinating discharge planning if the patient needs post-hospital services based on physician/advanced practitioner order or complex needs related to functional status, cognitive ability, or social support system  Outcome: Progressing

## 2023-01-08 NOTE — PLAN OF CARE
Problem: PAIN - ADULT  Goal: Verbalizes/displays adequate comfort level or baseline comfort level  Description: Interventions:  - Encourage patient to monitor pain and request assistance  - Assess pain using appropriate pain scale  - Administer analgesics based on type and severity of pain and evaluate response  - Implement non-pharmacological measures as appropriate and evaluate response  - Consider cultural and social influences on pain and pain management  - Notify physician/advanced practitioner if interventions unsuccessful or patient reports new pain  Outcome: Progressing     Problem: INFECTION - ADULT  Goal: Absence or prevention of progression during hospitalization  Description: INTERVENTIONS:  - Assess and monitor for signs and symptoms of infection  - Monitor lab/diagnostic results  - Monitor all insertion sites, i e  indwelling lines, tubes, and drains  - Monitor endotracheal if appropriate and nasal secretions for changes in amount and color  - [place] appropriate cooling/warming therapies per order  - Administer medications as ordered  - Instruct and encourage patient and family to use good hand hygiene technique  - Identify and instruct in appropriate isolation precautions for identified infection/condition  Outcome: Progressing  Goal: Absence of fever/infection during neutropenic period  Description: INTERVENTIONS:  - Monitor WBC    Outcome: Progressing     Problem: SAFETY ADULT  Goal: Patient will remain free of falls  Description: INTERVENTIONS:  - Educate patient/family on patient safety including physical limitations  - Instruct patient to call for assistance with activity   - Consult OT/PT to assist with strengthening/mobility   - Keep Call bell within reach  - Keep bed low and locked with side rails adjusted as appropriate  - Keep care items and personal belongings within reach  - Initiate and maintain comfort rounds  - Make Fall Risk Sign visible to staff  - Offer Toileting every  Hours, in advance of need  - Initiate/Maintain alarm  - Obtain necessary fall risk management equipment:   - Apply yellow socks and bracelet for high fall risk patients  - Consider moving patient to room near nurses station  Outcome: Progressing  Goal: Maintain or return to baseline ADL function  Description: INTERVENTIONS:  -  Assess patient's ability to carry out ADLs; assess patient's baseline for ADL function and identify physical deficits which impact ability to perform ADLs (bathing, care of mouth/teeth, toileting, grooming, dressing, etc )  - Assess/evaluate cause of self-care deficits   - Assess range of motion  - Assess patient's mobility; develop plan if impaired  - Assess patient's need for assistive devices and provide as appropriate  - Encourage maximum independence but intervene and supervise when necessary  - Involve family in performance of ADLs  - Assess for home care needs following discharge   - Consider OT consult to assist with ADL evaluation and planning for discharge  - Provide patient education as appropriate  Outcome: Progressing  Goal: Maintains/Returns to pre admission functional level  Description: INTERVENTIONS:  - Perform BMAT or MOVE assessment daily    - Set and communicate daily mobility goal to care team and patient/family/caregiver  - Collaborate with rehabilitation services on mobility goals if consulted  - Perform Range of Motion  times a day  - Reposition patient every  hours    - Dangle patient  times a day  - Stand patient  times a day  - Ambulate patient  times a day  - Out of bed to chair  times a day   - Out of bed for joie times a day  - Out of bed for toileting  - Record patient progress and toleration of activity level   Outcome: Progressing     Problem: Knowledge Deficit  Goal: Patient/family/caregiver demonstrates understanding of disease process, treatment plan, medications, and discharge instructions  Description: Complete learning assessment and assess knowledge base   Interventions:  - Provide teaching at level of understanding  - Provide teaching via preferred learning methods  Outcome: Progressing     Problem: DISCHARGE PLANNING  Goal: Discharge to home or other facility with appropriate resources  Description: INTERVENTIONS:  - Identify barriers to discharge w/patient and caregiver  - Arrange for needed discharge resources and transportation as appropriate  - Identify discharge learning needs (meds, wound care, etc )  - Arrange for interpretive services to assist at discharge as needed  - Refer to Case Management Department for coordinating discharge planning if the patient needs post-hospital services based on physician/advanced practitioner order or complex needs related to functional status, cognitive ability, or social support system  Outcome: Progressing

## 2023-01-08 NOTE — PROGRESS NOTES
Progress Note - OB/GYN  Benjie Henderson 28 y o  female MRN: [id number]  Unit/Bed#: -01 Encounter: [id number]    Assessment and Plan     Angelina Shipley is a patient of: [address] [place] [address]  She is PPD# 2 s/p RLTCS  Recovering well and is stable       * Status post repeat low transverse  section  Assessment & Plan   mL, Hgb 11 9 --> 9 7  DVT ppx (BMI 29): SCDs   Contraception: Post placental Mirena IUD    Isolated proteinuria  Assessment & Plan  No elevated BP this pregnancy  Urine P:C 0 34  Iron deficiency anemia  Assessment & Plan  Continue PO iron, Colace, and MiraLax  Ferritin 6  Marijuana use disorder in remission  Assessment & Plan  Used marijuana prior to pregnancy, stopped once pregnant  Admission UDS negative  · CM consult postpartum      Disposition    - Anticipate discharge home on POD# 3-4      Subjective/Objective     Chief Complaint: Postpartum State     Subjective:    Benjie Henderson is PPD/POD#2 s/p RLTCS  She has no current complaints  Pain is well controlled  Patient is currently voiding  She is ambulating  Patient is currently passing flatus and has had no bowel movement  She is tolerating PO, and denies nausea or vomitting  Patient denies fever, chills, chest pain, shortness of breath, or calf tenderness  Lochia is minimal  She is  Breastfeeding  She is recovering well and is stable  Vitals:   BP 93/50   Pulse 69   Temp 98 1 °F (36 7 °C) (Oral)   Resp 18   Ht 5' 6" (1 676 m)   Wt 83 6 kg (184 lb 3 9 oz)   LMP 2022 (Exact Date)   SpO2 97%   Breastfeeding Yes   BMI 29 74 kg/m²       Intake/Output Summary (Last 24 hours) at 2023 0901  Last data filed at 2023 1800  Gross per 24 hour   Intake --   Output 1600 ml   Net -1600 ml       Invasive Devices     Peripheral Intravenous Line  Duration           Peripheral IV 23 Distal;Dorsal (posterior); Left Forearm 1 day                Physical Exam:   GEN: Angelina Shipley appears well, alert and oriented x 3, pleasant and cooperative   CARDIO: RRR, no murmurs or rubs  RESP:  CTAB, no wheezes or rales  ABDOMEN: soft, no tenderness, no distention, fundus @ U-2, Incision C/D/I  EXTREMITIES: SCDs on, non tender, no erythema      Labs:     Hemoglobin   Date Value Ref Range Status   01/07/2023 9 7 (L) 11 5 - 15 4 g/dL Final   01/06/2023 11 9 11 5 - 15 4 g/dL Final     WBC   Date Value Ref Range Status   01/07/2023 13 21 (H) 4 31 - 10 16 Thousand/uL Final   01/06/2023 13 04 (H) 4 31 - 10 16 Thousand/uL Final     Platelets   Date Value Ref Range Status   01/07/2023 217 149 - 390 Thousands/uL Final   01/06/2023 218 149 - 390 Thousands/uL Final     Creatinine   Date Value Ref Range Status   12/31/2022 0 55 (L) 0 60 - 1 30 mg/dL Final     Comment:     Standardized to IDMS reference method   12/30/2022 0 65 0 60 - 1 30 mg/dL Final     Comment:     Standardized to IDMS reference method     AST   Date Value Ref Range Status   12/31/2022 16 13 - 39 U/L Final     Comment:     Specimen collection should occur prior to Sulfasalazine administration due to the potential for falsely depressed results  12/30/2022 16 13 - 39 U/L Final     Comment:     Specimen collection should occur prior to Sulfasalazine administration due to the potential for falsely depressed results  ALT   Date Value Ref Range Status   12/31/2022 11 7 - 52 U/L Final     Comment:     Specimen collection should occur prior to Sulfasalazine administration due to the potential for falsely depressed results  12/30/2022 10 7 - 52 U/L Final     Comment:     Specimen collection should occur prior to Sulfasalazine administration due to the potential for falsely depressed results             Joy Pollard MD  1/8/2023  9:01 AM

## 2023-01-09 VITALS
WEIGHT: 184.24 LBS | SYSTOLIC BLOOD PRESSURE: 116 MMHG | DIASTOLIC BLOOD PRESSURE: 64 MMHG | TEMPERATURE: 98.1 F | BODY MASS INDEX: 29.61 KG/M2 | RESPIRATION RATE: 20 BRPM | HEART RATE: 74 BPM | HEIGHT: 66 IN | OXYGEN SATURATION: 93 %

## 2023-01-09 LAB — RPR SER QL: NORMAL

## 2023-01-09 RX ORDER — IBUPROFEN 600 MG/1
600 TABLET ORAL EVERY 6 HOURS PRN
Qty: 30 TABLET | Refills: 0
Start: 2023-01-09

## 2023-01-09 RX ADMIN — IBUPROFEN 600 MG: 600 TABLET, FILM COATED ORAL at 09:57

## 2023-01-09 RX ADMIN — ACETAMINOPHEN 650 MG: 325 TABLET, FILM COATED ORAL at 09:57

## 2023-01-09 RX ADMIN — DOCUSATE SODIUM 100 MG: 100 CAPSULE, LIQUID FILLED ORAL at 09:06

## 2023-01-09 RX ADMIN — IBUPROFEN 600 MG: 600 TABLET, FILM COATED ORAL at 03:43

## 2023-01-09 RX ADMIN — ACETAMINOPHEN 650 MG: 325 TABLET, FILM COATED ORAL at 03:43

## 2023-01-09 NOTE — PLAN OF CARE
Problem: PAIN - ADULT  Goal: Verbalizes/displays adequate comfort level or baseline comfort level  Description: Interventions:  - Encourage patient to monitor pain and request assistance  - Assess pain using appropriate pain scale  - Administer analgesics based on type and severity of pain and evaluate response  - Implement non-pharmacological measures as appropriate and evaluate response  - Consider cultural and social influences on pain and pain management  - Notify physician/advanced practitioner if interventions unsuccessful or patient reports new pain  Outcome: Progressing     Problem: INFECTION - ADULT  Goal: Absence or prevention of progression during hospitalization  Description: INTERVENTIONS:  - Assess and monitor for signs and symptoms of infection  - Monitor lab/diagnostic results  - Monitor all insertion sites, i e  indwelling lines, tubes, and drains  - Monitor endotracheal if appropriate and nasal secretions for changes in amount and color  - [place] appropriate cooling/warming therapies per order  - Administer medications as ordered  - Instruct and encourage patient and family to use good hand hygiene technique  - Identify and instruct in appropriate isolation precautions for identified infection/condition  Outcome: Progressing  Goal: Absence of fever/infection during neutropenic period  Description: INTERVENTIONS:  - Monitor WBC    Outcome: Progressing     Problem: SAFETY ADULT  Goal: Patient will remain free of falls  Description: INTERVENTIONS:  - Educate patient/family on patient safety including physical limitations  - Instruct patient to call for assistance with activity   - Consult OT/PT to assist with strengthening/mobility   - Keep Call bell within reach  - Keep bed low and locked with side rails adjusted as appropriate  - Keep care items and personal belongings within reach  - Initiate and maintain comfort rounds  - Make Fall Risk Sign visible to staff  - Apply yellow socks and bracelet for high fall risk patients  - Consider moving patient to room near nurses station  Outcome: Progressing  Goal: Maintain or return to baseline ADL function  Description: INTERVENTIONS:  -  Assess patient's ability to carry out ADLs; assess patient's baseline for ADL function and identify physical deficits which impact ability to perform ADLs (bathing, care of mouth/teeth, toileting, grooming, dressing, etc )  - Assess/evaluate cause of self-care deficits   - Assess range of motion  - Assess patient's mobility; develop plan if impaired  - Assess patient's need for assistive devices and provide as appropriate  - Encourage maximum independence but intervene and supervise when necessary  - Involve family in performance of ADLs  - Assess for home care needs following discharge   - Consider OT consult to assist with ADL evaluation and planning for discharge  - Provide patient education as appropriate  Outcome: Progressing  Goal: Maintains/Returns to pre admission functional level  Description: INTERVENTIONS:  - Perform BMAT or MOVE assessment daily    - Set and communicate daily mobility goal to care team and patient/family/caregiver  - Out of bed for toileting  - Record patient progress and toleration of activity level   Outcome: Progressing     Problem: Knowledge Deficit  Goal: Patient/family/caregiver demonstrates understanding of disease process, treatment plan, medications, and discharge instructions  Description: Complete learning assessment and assess knowledge base    Interventions:  - Provide teaching at level of understanding  - Provide teaching via preferred learning methods  Outcome: Progressing     Problem: DISCHARGE PLANNING  Goal: Discharge to home or other facility with appropriate resources  Description: INTERVENTIONS:  - Identify barriers to discharge w/patient and caregiver  - Arrange for needed discharge resources and transportation as appropriate  - Identify discharge learning needs (meds, wound care, etc )  - Arrange for interpretive services to assist at discharge as needed  - Refer to Case Management Department for coordinating discharge planning if the patient needs post-hospital services based on physician/advanced practitioner order or complex needs related to functional status, cognitive ability, or social support system  Outcome: Progressing

## 2023-01-09 NOTE — PROGRESS NOTES
Progress Note - OB/GYN  Wendall Phalen 28 y o  female MRN: [id number]  Unit/Bed#: -01 Encounter: [id number]    Assessment and Plan     Angelinajordan Christianson is a patient of: [address] [place] [address]  She is PPD# 3 s/p  repeat  section, low transverse incision and IUD insertion  Recovering well and is stable       Isolated proteinuria  Assessment & Plan  No elevated BP this pregnancy  Urine P:C 0 34  Iron deficiency anemia  Assessment & Plan  Continue PO iron, Colace, and MiraLax  Ferritin 6  Marijuana use disorder in remission  Assessment & Plan  Used marijuana prior to pregnancy, stopped once pregnant  Admission UDS negative  · CM consult postpartum    * Status post repeat low transverse  section  Assessment & Plan   mL, Hgb 11 9 --> 9 7  DVT ppx (BMI 29): SCDs   Contraception: Post placental Mirena IUD  Encourage breastfeeding  Encourage ambulation          Disposition    - Anticipate discharge home on PPD# 3-4      Subjective/Objective     Chief Complaint: Postpartum State     Subjective:    Wendall Phalen is POD#3 s/p  repeat  section, low transverse incision and IUD insertion  She is reporting numbness on her left lower quadrant  Pain is well controlled  Patient is currently voiding  She is ambulating  Patient is currently passing flatus and has had no bowel movement  She is tolerating PO, and denies nausea or vomitting  Patient denies fever, chills, chest pain, shortness of breath, or calf tenderness  Lochia is normal  She is bottle feeding  She is recovering well and is stable         Vitals:   /59 (BP Location: Left arm)   Pulse 84   Temp 98 1 °F (36 7 °C) (Oral)   Resp 20   Ht 5' 6" (1 676 m)   Wt 83 6 kg (184 lb 3 9 oz)   LMP 2022 (Exact Date)   SpO2 93%   Breastfeeding Yes   BMI 29 74 kg/m²     No intake or output data in the 24 hours ending 23 0617    Invasive Devices     None                 Physical Exam:   GEN: Angelina July Ponce appears well, alert and oriented x 3, pleasant and cooperative   CARDIO: RRR, no murmurs or rubs  RESP:  CTAB, no wheezes or rales  ABDOMEN: soft, no tenderness, no distention, fundus @ -1, Incision C/D/I  EXTREMITIES: SCDs on, non tender, no erythema, b/l Chelo's sign negative      Labs:     Hemoglobin   Date Value Ref Range Status   01/07/2023 9 7 (L) 11 5 - 15 4 g/dL Final   01/06/2023 11 9 11 5 - 15 4 g/dL Final     WBC   Date Value Ref Range Status   01/07/2023 13 21 (H) 4 31 - 10 16 Thousand/uL Final   01/06/2023 13 04 (H) 4 31 - 10 16 Thousand/uL Final     Platelets   Date Value Ref Range Status   01/07/2023 217 149 - 390 Thousands/uL Final   01/06/2023 218 149 - 390 Thousands/uL Final     Creatinine   Date Value Ref Range Status   12/31/2022 0 55 (L) 0 60 - 1 30 mg/dL Final     Comment:     Standardized to IDMS reference method   12/30/2022 0 65 0 60 - 1 30 mg/dL Final     Comment:     Standardized to IDMS reference method     AST   Date Value Ref Range Status   12/31/2022 16 13 - 39 U/L Final     Comment:     Specimen collection should occur prior to Sulfasalazine administration due to the potential for falsely depressed results  12/30/2022 16 13 - 39 U/L Final     Comment:     Specimen collection should occur prior to Sulfasalazine administration due to the potential for falsely depressed results  ALT   Date Value Ref Range Status   12/31/2022 11 7 - 52 U/L Final     Comment:     Specimen collection should occur prior to Sulfasalazine administration due to the potential for falsely depressed results  12/30/2022 10 7 - 52 U/L Final     Comment:     Specimen collection should occur prior to Sulfasalazine administration due to the potential for falsely depressed results             Ally Rojas MD  1/9/2023  6:17 AM

## 2023-01-09 NOTE — LACTATION NOTE
This note was copied from a baby's chart  Discharge Lactation: mom is excl  Pumping  Mom has not pumped since she had suction to high and caused a blister  Education on wet wound care  Ed  On timing of feedings / pumping sessions  Rt breast needs a 21 mm morrow  Left breast is 24 mm flange  Provided to mom with how to size medela pump at home  Ed  On cycle and hands on pumping techniques  Use of hand pump after pumping session to transfer colostrum  Mom expressed most she has ever expressed since baby has been born  Ed  On how to syringe feed  Reviewed paced bottle feeding  Mom wants an appt at baby and me - called, left vm    Handouts: WHO how to prepare formula, increase supply, pumping log    Enc  To call lactation  Pumping:   - When pumping, begin in stimulation mode (high cycle, low vacuum) until milk begins to express  Change pump to expression mode (low cycle, high vacuum)  Use hands on pumping techniques to assist with milk transfer  When milk stops expressing, change back to stimulation mode  When milk begins to flow, change to expression mode  You make cycle pump up to three times in a pumping session  Medela pump: Fit flanges so nipple easily moves through tunnel  Press the on button  Pump will automatically start in stimulation mode  After 2 minutes, pump will cycle to expression mode  Use the + and - buttons to increase & decrease suction  After milk stops expressing from the nipple, press the button with the image of the milk droplets  This will take your pump back to stimulation mode  Allow pump to stimulate the breast for another 2 min  Allow the pump to move into expression mode  Cycle between Stimulation and Expression mode at least 3 times in a 20 min  Pumping session  Education on alternative feeding methods  Demonstration  of (, syringe, )  Encouraged to call lactation for additional assistance with feedings      Education on non-nutritive suck, how the use of pacifier affecting feeds, shallow latch due to pacifier use, and signs of satiation on the breast  Encouraged offering both breasts at least once, up to two times in a feeding session  Feed expressed milk or formula as needed/desired  Paced bottle feeding technique is less stressful for your baby, prevents overfeeding and protects the breastfeeding relationship  You can find a video about paced bottle feeding at www lacted  org      Milk Supply:   - Allow for non-nutritive suck at the breast to stimulate supply   - Allow for skin to skin during and after each breastfeeding session   - Use massage, heat, and hand expression prior to feedings to assist with deep latch   - Increase pumping sessions and pump after every feeding    Provided education on growth spurts, when to introduce bottles; paced bottle feeding, and non-nutritive suck at the breast  Provided education on Signs of satiation  Encouraged to call lactation to observe a latch prior to discharge for reassurance  Encouraged to call baby and me with any questions and closely monitor output  Met with mother to go over discharge breastfeeding booklet including the feeding log  Emphasized 8 or more (12) feedings in a 24 hour period, what to expect for the number of diapers per day of life and the progression of properties of the  stooling pattern  Reviewed breastfeeding and your lifestyle, storage and preparation of breast milk, how to keep you breast pump clean, the employed breastfeeding mother and paced bottle feeding handouts  Booklet included Breastfeeding Resources for after discharge including access to the number for the [address]

## 2023-01-10 NOTE — UTILIZATION REVIEW
NOTIFICATION OF INPATIENT ADMISSION   MATERNITY/DELIVERY AUTHORIZATION REQUEST   SERVICING FACILITY:   [facility] - L&D, , NICU  [address] [facility], [address] [place] [address]  Tax ID: [id number]  NPI: [id number]   ATTENDING PROVIDER:  Attending Name and NPI#: Libia Cox Md [[id number]]  Address: [address] [place] [address], [address] [place] [address]  Phone: [phone]   ADMISSION INFORMATION:  Place of Service: Inpatient [address] [facility]  [address]  Place of Service Code: 21  Inpatient Admission Date/Time: 23 10:58 AM  Discharge Date/Time: 2023  1:25 PM  Admitting Diagnosis Code/Description:  Previous  section complicating pregnancy [V33 648]  Encounter for  delivery without indication [O82]     Mother: Camilo Kraft 1990 Estimated Date of Delivery: 23  Delivering clinician:     OB History        5    Para   3    Term   3            AB   2    Living   3       SAB        IAB   2    Ectopic        Multiple   0    Live Births   3               [place] Name & MRN:   Information for the patient's :  Pricila Elizalde [[id number]]     [place] Delivery Information:  Sex: male  Delivered 2023 3:50 PM by , Low Transverse; Gestational Age: 36w0d    [place] Measurements:  Weight: 7 lb 10 6 oz (3475 g); Height: 19"    APGAR 1 minute 5 minutes 10 minutes   Totals: 9 9       Birth Information: 28 y o  female MRN: [id number] Unit/Bed#: -01   Birthweight: No birth weight on file  Gestational Age: <None> Delivery Type:    APGARS Totals:        UTILIZATION REVIEW CONTACT:  Jose Main Utilization   Network Utilization Review Department  Phone: [phone]  Fax [phone]  Email: [email or web address]  Contact for approvals/pending authorizations, clinical reviews, and discharge       PHYSICIAN ADVISORY SERVICES:  Medical Necessity Denial & [facility] Review  Phone: [phone] Fax: [phone]  Email: [email or web address]

## 2023-01-11 ENCOUNTER — TELEPHONE (OUTPATIENT)
Dept: OBGYN CLINIC | Facility: CLINIC | Age: 33
End: 2023-01-11

## 2023-01-12 LAB — PLACENTA IN STORAGE: NORMAL

## 2023-01-16 ENCOUNTER — OFFICE VISIT (OUTPATIENT)
Dept: OBGYN CLINIC | Facility: CLINIC | Age: 33
End: 2023-01-16

## 2023-01-16 VITALS
SYSTOLIC BLOOD PRESSURE: 118 MMHG | HEIGHT: 66 IN | WEIGHT: 161 LBS | DIASTOLIC BLOOD PRESSURE: 77 MMHG | HEART RATE: 60 BPM | BODY MASS INDEX: 25.88 KG/M2 | RESPIRATION RATE: 18 BRPM

## 2023-01-16 DIAGNOSIS — Z98.891 STATUS POST REPEAT LOW TRANSVERSE CESAREAN SECTION: Primary | ICD-10-CM

## 2023-01-16 NOTE — PROGRESS NOTES
Subjective     Angelinajordan Barnes is a 28 y o  y o  female [id number] who presents for a postpartum visit  She is 1 week postpartum following a low cervical transverse  section on 23  I have fully reviewed the prenatal and intrapartum course  The delivery was at 44 gestational weeks  Outcome: repeat  section, low transverse incision  Anesthesia: spinal  Postpartum course has been uncomplicated  Baby's course has been uncomplicated  Baby is feeding by both breast and bottle - Similac 360  Bleeding thin lochia  Bowel function is normal  Bladder function is normal  Patient is not sexually active  Contraception method is Mirena IUD  Trena Garre The following portions of the patient's history were reviewed and updated as appropriate: current medications, past medical history and past surgical history  Review of Systems  Pertinent items are noted in HPI       Objective   Vitals:    23 1453   BP: 118/77   Pulse: 60   Resp: 18       Physical Exam  Constitutional:       Appearance: Normal appearance  Cardiovascular:      Rate and Rhythm: Normal rate  Pulmonary:      Effort: Pulmonary effort is normal    Musculoskeletal:         General: No swelling or tenderness  Neurological:      Mental Status: She is alert  Skin:     General: Skin is warm and dry  Comments: Incision: C/D/I   Vitals reviewed  Assessment/Plan     Normal postpartum exam     1  Contraception: IUD  3   Follow up in: 3 weeks

## 2023-02-06 ENCOUNTER — POSTPARTUM VISIT (OUTPATIENT)
Dept: OBGYN CLINIC | Facility: CLINIC | Age: 33
End: 2023-02-06

## 2023-02-06 VITALS
TEMPERATURE: 98.3 F | HEART RATE: 61 BPM | DIASTOLIC BLOOD PRESSURE: 82 MMHG | SYSTOLIC BLOOD PRESSURE: 117 MMHG | HEIGHT: 66 IN | BODY MASS INDEX: 25.01 KG/M2 | WEIGHT: 155.6 LBS

## 2023-02-06 DIAGNOSIS — Z98.891 STATUS POST REPEAT LOW TRANSVERSE CESAREAN SECTION: ICD-10-CM

## 2023-02-06 PROBLEM — O36.8190 DECREASED FETAL MOVEMENT: Status: RESOLVED | Noted: 2022-12-25 | Resolved: 2023-02-06

## 2023-02-06 NOTE — PROGRESS NOTES
POSTPARTUM VISIT    Sabi Romano is a 28year old   She presents today for postpartum visit  She had a repeat  delivery on 82 without complications  She is breast and bottle feeding her infant and reports no issues with such  She received a post placental Mirena IUD for contraception  She was provided with Rosalio Vernon Depression Screening tool and her score was 3  Review of Systems:   -Constitutional: denies issues, denies pain   -Breasts: denies tenderness   -Gynecologic: lochia scant   -Urinary: denies issues urinating   -GI: stools WNL, denies issues  She did have some issues with hemorrhoids which are improving with time     Physical Exam:   -Vitals:   Vitals:    23 1523   BP: 117/82   BP Location: Left arm   Patient Position: Sitting   Cuff Size: Adult   Pulse: 61   Temp: 98 3 °F (36 8 °C)   TempSrc: Oral   Weight: 70 6 kg (155 lb 9 6 oz)   Height: 5' 6" (1 676 m)      -General: A&Ox3, no acute distress noted   -Abdomen: soft, non-tender, incision appears clean/dry/intact and well-healed   -Extremities: nontender, no edema noted   -Breasts:    Deferred    -Pelvic exam:    Deferred     Assessment/Plan:  1  Normal postpartum exam   2  Depression screening negative  3  Last pap smear was done 2022 and result was NILM, HR-HPV negative  Advise return for next annual GYN exam in   Contraception: Mirena IUD  Declines string check today  Will return for string check

## 2023-02-15 ENCOUNTER — OFFICE VISIT (OUTPATIENT)
Dept: OBGYN CLINIC | Facility: CLINIC | Age: 33
End: 2023-02-15

## 2023-02-15 VITALS
WEIGHT: 155 LBS | SYSTOLIC BLOOD PRESSURE: 133 MMHG | DIASTOLIC BLOOD PRESSURE: 80 MMHG | BODY MASS INDEX: 24.91 KG/M2 | HEART RATE: 66 BPM | HEIGHT: 66 IN

## 2023-02-15 DIAGNOSIS — T83.32XA INTRAUTERINE CONTRACEPTIVE DEVICE THREADS LOST, INITIAL ENCOUNTER: ICD-10-CM

## 2023-02-15 DIAGNOSIS — Z30.431 IUD CHECK UP: Primary | ICD-10-CM

## 2023-02-15 NOTE — PROGRESS NOTES
PROBLEM GYNECOLOGICAL VISIT    Joel Prado is a 28 y o  female who presents today for an IUD check  Her general medical history has been reviewed and she reports it as follows:    Past Medical History:   Diagnosis Date   • Varicella     disease as a child     Past Surgical History:   Procedure Laterality Date   •  SECTION     • COSMETIC SURGERY      Pt states " [facility]"   • LIPOSUCTION     • CA  DELIVERY ONLY N/A 2023    Procedure:  SECTION () REPEAT;  Surgeon: Zunilda Mcclellan MD;  [facility]: [facility];  Service: Obstetrics   • CA INSERTION INTRAUTERINE DEVICE IUD N/A 2023    Procedure: INSERTION OF INTRAUTERINE DEVICE (IUD);   Surgeon: Zunilda Mcclellan MD;  Location: [facility] ;  Service: Obstetrics     OB History        5    Para   3    Term   3            AB   2    Living   3       SAB        IAB   2    Ectopic        Multiple   0    Live Births   3               Social History     Tobacco Use   • Smoking status: Former     Types: Cigarettes     Quit date:      Years since quittin 1   • Smokeless tobacco: Never   • Tobacco comments:     Former smoker - As per [facility]   • Vaping Use: Never used   Substance Use Topics   • Alcohol use: Never   • Drug use: Not Currently     Types: Marijuana     Social History     Substance and Sexual Activity   Sexual Activity Not Currently   • Partners: Male   • Birth control/protection: None       Current Outpatient Medications   Medication Instructions   • acetaminophen (TYLENOL) 1,000 mg, Every 6 hours PRN   • docusate sodium (COLACE) 100 mg, Oral, 2 times daily   • ferrous sulfate (MISHA-IN-SOL) 75 (15 Fe) mg/mL drops 30 mg of iron, Oral, Daily   • ibuprofen (MOTRIN) 600 mg, Oral, Every 6 hours PRN   • ondansetron (ZOFRAN) 4 mg, Oral, Every 8 hours PRN   • Prenatal Vit-Fe Fumarate-FA (PRENATAL VITAMINS PO) Take by mouth       History of Present Illness:   Agnelina presents today for an IUD check  She had a Mirena IUD placed during her repeat  on 23  She has not yet had the strings checked in the office since delivery  She reports that she tried to check the strings at home this week and was unable to locate them     Review of Systems:  Review of Systems   Constitutional: Negative  Gastrointestinal: Negative  Genitourinary: Negative  Physical Exam:  /80 (BP Location: Right arm, Patient Position: Sitting, Cuff Size: Adult)   Pulse 66   Ht 5' 6" (1 676 m)   Wt 70 3 kg (155 lb)   LMP 2022 (Exact Date)   Breastfeeding No   BMI 25 02 kg/m²   Physical Exam  Constitutional:       Appearance: Normal appearance  Genitourinary:      Vulva normal       No lesions in the vagina  Right Adnexa: not tender, not full and no mass present  Left Adnexa: not tender, not full and no mass present  No cervical motion tenderness  No IUD strings visualized  Neurological:      Mental Status: She is alert  Skin:     General: Skin is warm and dry  Psychiatric:         Mood and Affect: Mood normal          Behavior: Behavior normal    Vitals reviewed  Assessment:   1  IUD check   2  Intrauterine contraceptive device threads lost, initial encounter    Plan:   1  IUD strings not visualized  Unable to locate strings with cytobrush  2  Orders placed for pelvic US to assess for IUD placement  3  Will call patient with imaging results to determine further plan of care  Reviewed with patient that test results are available in MyChart immediately, but that they will not necessarily be reviewed by me immediately  Explained that I will review results at my earliest opportunity and contact patient appropriately

## 2023-02-17 ENCOUNTER — HOSPITAL ENCOUNTER (OUTPATIENT)
Dept: RADIOLOGY | Age: 33
Discharge: HOME | End: 2023-02-17

## 2023-02-17 DIAGNOSIS — T83.32XA INTRAUTERINE CONTRACEPTIVE DEVICE THREADS LOST, INITIAL ENCOUNTER: ICD-10-CM

## 2023-03-01 ENCOUNTER — PROCEDURE VISIT (OUTPATIENT)
Dept: OBGYN CLINIC | Facility: CLINIC | Age: 33
End: 2023-03-01

## 2023-03-01 VITALS
HEIGHT: 66 IN | SYSTOLIC BLOOD PRESSURE: 146 MMHG | DIASTOLIC BLOOD PRESSURE: 90 MMHG | HEART RATE: 76 BPM | WEIGHT: 158.8 LBS | BODY MASS INDEX: 25.52 KG/M2

## 2023-03-01 DIAGNOSIS — Z53.8 UNSUCCESSFUL ATTEMPT TO REMOVE INTRAUTERINE DEVICE (IUD): Primary | ICD-10-CM

## 2023-03-01 DIAGNOSIS — Z97.5 UNSUCCESSFUL ATTEMPT TO REMOVE INTRAUTERINE DEVICE (IUD): Primary | ICD-10-CM

## 2023-03-01 LAB — SL AMB POCT URINE HCG: NEGATIVE

## 2023-03-01 NOTE — PROGRESS NOTES
Iud removal    Date/Time: 3/1/2023 11:16 AM  Performed by: LANCE Montes  Authorized by: LANCE Montes   Universal Protocol:  Consent: Verbal consent obtained  Written consent obtained  Risks and benefits: risks, benefits and alternatives were discussed  Consent given by: patient  Time out: Immediately prior to procedure a "time out" was called to verify the correct patient, procedure, equipment, support staff and site/side marked as required  Patient understanding: patient states understanding of the procedure being performed  Patient consent: the patient's understanding of the procedure matches consent given  Procedure consent: procedure consent matches procedure scheduled  Relevant documents: relevant documents present and verified  Test results: test results available and properly labeled  Patient identity confirmed: verbally with patient      Procedure:     Removed with no complications: no      Other reason for removal:  Malpositioned IUD   Comments:      Patient presented today for an attempted IUD removal  She had a post placenta Mirena IUD placed 1/6/23  She had an ultrasound to confirm placement on 2/17/23 after IUD strings were unable to be visualized  US reported "Definite position of the IUD is difficult to determine though at least one arm (left) appears to extend into the endometrium  The right arm is not well visualized  The string appears coiled in the cervix"  IUD strings were not able to be grasped with forceps or IUD hook  Procedure was discontinued  Discussed with patient likely need for removal in OR  Discussed contraception  After IUD removal she would like to use condoms and declines other contraceptive methods  Recommended condom use if sexually active now due to malpositioning of IUD   Follow up visit scheduled with physician to further discuss removal

## 2023-03-09 ENCOUNTER — TELEPHONE (OUTPATIENT)
Dept: OBGYN CLINIC | Facility: CLINIC | Age: 33
End: 2023-03-09

## 2023-03-09 ENCOUNTER — OFFICE VISIT (OUTPATIENT)
Dept: OBGYN CLINIC | Facility: CLINIC | Age: 33
End: 2023-03-09

## 2023-03-09 VITALS
SYSTOLIC BLOOD PRESSURE: 111 MMHG | BODY MASS INDEX: 26.07 KG/M2 | HEART RATE: 73 BPM | WEIGHT: 162.2 LBS | HEIGHT: 66 IN | RESPIRATION RATE: 18 BRPM | DIASTOLIC BLOOD PRESSURE: 76 MMHG

## 2023-03-09 DIAGNOSIS — Z30.432 ENCOUNTER FOR IUD REMOVAL: ICD-10-CM

## 2023-03-09 DIAGNOSIS — T83.32XD INTRAUTERINE CONTRACEPTIVE DEVICE THREADS LOST, SUBSEQUENT ENCOUNTER: Primary | ICD-10-CM

## 2023-03-09 NOTE — PROGRESS NOTES
Iud removal    Date/Time: 3/9/2023 10:51 AM  Performed by: Rhoda Jensen MD  Authorized by: Rhoda Jensen MD   Universal Protocol:  Procedure performed by: Freddy Clarke MD)  Risks and benefits: risks, benefits and alternatives were discussed  Consent given by: patient  Time out: Immediately prior to procedure a "time out" was called to verify the correct patient, procedure, equipment, support staff and site/side marked as required  Patient understanding: patient states understanding of the procedure being performed  Patient consent: the patient's understanding of the procedure matches consent given  Procedure consent: procedure consent matches procedure scheduled  Relevant documents: relevant documents present and verified  Required items: required blood products, implants, devices, and special equipment available  Patient identity confirmed: verbally with patient      Procedure:     Removed with no complications: yes    Comments:      First attempt with ant clamps unsuccessful  Second attempt with IUD hook successful    Mirena IUD removed intact  Plans to use condoms for contraception  Immediate use advised  Return of menses discussed  Follow up in June 2023 for annual well woman exam     Dutch Gallardo MD  PGY-III, OB/GYN  3/9/2023, 10:53 AM

## 2023-03-09 NOTE — LETTER
March 10, 2023     Patient: Shadi Montez  YOB: 1990  Date of Visit: 3/9/2023      To Whom it May Concern:    Dorota Patmatthew is under my professional care  Angelina was seen in my office on 3/9/2023  Angelina may return to work on 3/13/2023 with time allowed for breastfeeding and pumping if she desires       If you have any questions or concerns, please don't hesitate to call           Sincerely,          Jonah Carrera MD

## 2023-03-09 NOTE — TELEPHONE ENCOUNTER
Good Afternoon Dr Tyrese Currie,    Patient called requesting a back to work letter for Monday  She works from home and does not need her maternity leave extended  Would you be able to provide the letter? Please advise, Thank you!

## 2023-03-09 NOTE — LETTER
March 14, 2023     Patient: Ramona Sandoval  YOB: 1990  Date of Visit: 3/9/2023      To Whom it May Concern:    Priscila Curiel is under my professional care  Angelina was seen in my office on 3/9/2023  Angelina may return to work on 3/31/23 with breaks as needed for breastfeeding or pumping  If you have any questions or concerns, please don't hesitate to call           Sincerely,          Shiva Fishman MD        CC:   No Recipients

## 2023-03-10 NOTE — TELEPHONE ENCOUNTER
Good Morning Dr Frank Leonard,    I apologize for messaging you again  I cannot log into [employer] to obtain the letter for Angelina if you do not mind are you able to fax it to our regular fax machine  The number is [phone]  Thank you!

## 2023-05-30 ENCOUNTER — APPOINTMENT (OUTPATIENT)
Dept: LAB | Facility: CLINIC | Age: 33
End: 2023-05-30

## 2023-05-30 DIAGNOSIS — Z00.00 ROUTINE GENERAL MEDICAL EXAMINATION AT A HEALTH CARE FACILITY: ICD-10-CM

## 2023-05-30 DIAGNOSIS — Z11.3 ROUTINE SCREENING FOR STI (SEXUALLY TRANSMITTED INFECTION): ICD-10-CM

## 2023-05-30 DIAGNOSIS — N91.2 AMENORRHEA: ICD-10-CM

## 2023-05-30 LAB
25(OH)D3 SERPL-MCNC: 27.7 NG/ML (ref 30–100)
ALBUMIN SERPL BCP-MCNC: 4 G/DL (ref 3.5–5)
ALP SERPL-CCNC: 83 U/L (ref 46–116)
ALT SERPL W P-5'-P-CCNC: 23 U/L (ref 12–78)
ANION GAP SERPL CALCULATED.3IONS-SCNC: 0 MMOL/L (ref 4–13)
AST SERPL W P-5'-P-CCNC: 20 U/L (ref 5–45)
B-HCG SERPL-ACNC: 1 MIU/ML (ref 0–5)
BASOPHILS # BLD AUTO: 0.09 THOUSANDS/ÂΜL (ref 0–0.1)
BASOPHILS NFR BLD AUTO: 1 % (ref 0–1)
BILIRUB SERPL-MCNC: 0.65 MG/DL (ref 0.2–1)
BUN SERPL-MCNC: 15 MG/DL (ref 5–25)
CALCIUM SERPL-MCNC: 9 MG/DL (ref 8.3–10.1)
CHLORIDE SERPL-SCNC: 110 MMOL/L (ref 96–108)
CHOLEST SERPL-MCNC: 160 MG/DL
CO2 SERPL-SCNC: 26 MMOL/L (ref 21–32)
CREAT SERPL-MCNC: 0.93 MG/DL (ref 0.6–1.3)
EOSINOPHIL # BLD AUTO: 0.24 THOUSAND/ÂΜL (ref 0–0.61)
EOSINOPHIL NFR BLD AUTO: 4 % (ref 0–6)
ERYTHROCYTE [DISTWIDTH] IN BLOOD BY AUTOMATED COUNT: 14 % (ref 11.6–15.1)
GFR SERPL CREATININE-BSD FRML MDRD: 81 ML/MIN/1.73SQ M
GLUCOSE P FAST SERPL-MCNC: 95 MG/DL (ref 65–99)
HCT VFR BLD AUTO: 40.4 % (ref 34.8–46.1)
HDLC SERPL-MCNC: 34 MG/DL
HGB BLD-MCNC: 13.7 G/DL (ref 11.5–15.4)
HIV 1+2 AB+HIV1 P24 AG SERPL QL IA: NORMAL
HIV 2 AB SERPL QL IA: NORMAL
HIV1 AB SERPL QL IA: NORMAL
HIV1 P24 AG SERPL QL IA: NORMAL
IMM GRANULOCYTES # BLD AUTO: 0.02 THOUSAND/UL (ref 0–0.2)
IMM GRANULOCYTES NFR BLD AUTO: 0 % (ref 0–2)
LDLC SERPL CALC-MCNC: 107 MG/DL (ref 0–100)
LDLC SERPL DIRECT ASSAY-MCNC: 94 MG/DL (ref 0–100)
LYMPHOCYTES # BLD AUTO: 1.71 THOUSANDS/ÂΜL (ref 0.6–4.47)
LYMPHOCYTES NFR BLD AUTO: 25 % (ref 14–44)
MCH RBC QN AUTO: 29.3 PG (ref 26.8–34.3)
MCHC RBC AUTO-ENTMCNC: 33.9 G/DL (ref 31.4–37.4)
MCV RBC AUTO: 86 FL (ref 82–98)
MONOCYTES # BLD AUTO: 0.5 THOUSAND/ÂΜL (ref 0.17–1.22)
MONOCYTES NFR BLD AUTO: 7 % (ref 4–12)
NEUTROPHILS # BLD AUTO: 4.17 THOUSANDS/ÂΜL (ref 1.85–7.62)
NEUTS SEG NFR BLD AUTO: 63 % (ref 43–75)
NONHDLC SERPL-MCNC: 126 MG/DL
NRBC BLD AUTO-RTO: 0 /100 WBCS
PLATELET # BLD AUTO: 336 THOUSANDS/UL (ref 149–390)
PMV BLD AUTO: 10.8 FL (ref 8.9–12.7)
POTASSIUM SERPL-SCNC: 3.7 MMOL/L (ref 3.5–5.3)
PROT SERPL-MCNC: 7.3 G/DL (ref 6.4–8.4)
RBC # BLD AUTO: 4.68 MILLION/UL (ref 3.81–5.12)
SODIUM SERPL-SCNC: 136 MMOL/L (ref 135–147)
TREPONEMA PALLIDUM IGG+IGM AB [PRESENCE] IN SERUM OR PLASMA BY IMMUNOASSAY: NORMAL
TRIGL SERPL-MCNC: 93 MG/DL
TSH SERPL DL<=0.05 MIU/L-ACNC: 1.25 UIU/ML (ref 0.45–4.5)
WBC # BLD AUTO: 6.73 THOUSAND/UL (ref 4.31–10.16)

## 2023-05-31 LAB
C TRACH DNA SPEC QL NAA+PROBE: NEGATIVE
HSV1 IGG SER IA-ACNC: 27 INDEX (ref 0–0.9)
HSV2 IGG SER IA-ACNC: 4.38 INDEX (ref 0–0.9)
HSV2 IGG SERPL QL IA: NORMAL
N GONORRHOEA DNA SPEC QL NAA+PROBE: NEGATIVE

## 2023-06-05 LAB — MISCELLANEOUS LAB TEST RESULT: NORMAL

## 2023-06-21 ENCOUNTER — ANNUAL EXAM (OUTPATIENT)
Dept: OBGYN CLINIC | Facility: CLINIC | Age: 33
End: 2023-06-21

## 2023-06-21 VITALS
WEIGHT: 154 LBS | HEIGHT: 66 IN | BODY MASS INDEX: 24.75 KG/M2 | DIASTOLIC BLOOD PRESSURE: 87 MMHG | SYSTOLIC BLOOD PRESSURE: 124 MMHG

## 2023-06-21 DIAGNOSIS — R10.2 PELVIC PAIN: ICD-10-CM

## 2023-06-21 DIAGNOSIS — Z23 IMMUNIZATION DUE: ICD-10-CM

## 2023-06-21 DIAGNOSIS — Z01.419 WOMEN'S ANNUAL ROUTINE GYNECOLOGICAL EXAMINATION: Primary | ICD-10-CM

## 2023-06-21 DIAGNOSIS — Z12.4 CERVICAL CANCER SCREENING: ICD-10-CM

## 2023-06-21 DIAGNOSIS — Z12.39 ENCOUNTER FOR BREAST CANCER SCREENING USING NON-MAMMOGRAM MODALITY: ICD-10-CM

## 2023-06-21 PROCEDURE — 90651 9VHPV VACCINE 2/3 DOSE IM: CPT | Performed by: NURSE PRACTITIONER

## 2023-06-21 PROCEDURE — 99395 PREV VISIT EST AGE 18-39: CPT | Performed by: NURSE PRACTITIONER

## 2023-06-21 PROCEDURE — 90471 IMMUNIZATION ADMIN: CPT | Performed by: NURSE PRACTITIONER

## 2023-11-27 ENCOUNTER — APPOINTMENT (OUTPATIENT)
Dept: LAB | Facility: CLINIC | Age: 33
End: 2023-11-27
Payer: COMMERCIAL

## 2023-11-27 ENCOUNTER — OFFICE VISIT (OUTPATIENT)
Dept: OBGYN CLINIC | Facility: CLINIC | Age: 33
End: 2023-11-27
Payer: COMMERCIAL

## 2023-11-27 VITALS
WEIGHT: 147.6 LBS | HEIGHT: 66 IN | BODY MASS INDEX: 23.72 KG/M2 | SYSTOLIC BLOOD PRESSURE: 118 MMHG | DIASTOLIC BLOOD PRESSURE: 76 MMHG

## 2023-11-27 DIAGNOSIS — N91.2 AMENORRHEA: Primary | ICD-10-CM

## 2023-11-27 DIAGNOSIS — N91.2 AMENORRHEA: ICD-10-CM

## 2023-11-27 LAB — B-HCG SERPL-ACNC: 1 MIU/ML (ref 0–5)

## 2023-11-27 PROCEDURE — 36415 COLL VENOUS BLD VENIPUNCTURE: CPT

## 2023-11-27 PROCEDURE — 84702 CHORIONIC GONADOTROPIN TEST: CPT

## 2023-11-27 PROCEDURE — 99214 OFFICE O/P EST MOD 30 MIN: CPT | Performed by: OBSTETRICS & GYNECOLOGY

## 2023-11-27 NOTE — PROGRESS NOTES
This is a 61-year-old female, she is a  5 para 3 with 3  sections. Her last  section was in January of this year. She had the IUD inserted post placental.  This was removed in March of this year secondary to malposed the IUD the patient was. She had a VIP performed in October [place]. She states the procedure went without complications she is not aware how far along she was. Since that time she has not gotten a period. She is concerned about the possibility of pregnancy. She is also like to have another IUD inserted. Speculum examination shows to be vagina to be clean the cervix is closed consistent with 3  section. Uterus is normal size. Impression the patient is concerned why she is not getting a period. She is requesting a serum hCG level. We will order that. I informed her that is not unusual at the termination of pregnancy that your period is delayed. She would also like to have another IUD inserted. I think probably a complication last time with that was done post  section. She will need Cytotec for cervical ripening prior to insertion. We await the results of her next hCG level to be drawn today. If that is negative we will wait for the onset of her menstrual cycle. At that time she will call to make an appointment for IUD insertion. She will need Cytotec for cervical ripening. If her hCG level is elevated we will repeat another value in 48 hours.

## 2023-11-27 NOTE — PATIENT INSTRUCTIONS
The patient is concerned while she has not gotten her menstrual cycle after her VIP done last month. We will order a quantitative beta-hCG today. If that is negative we will wait for onset of menses to insert IUD. She will need Cytotec prior to insertion. If the value was elevated we will repeat another in 48 hours.

## 2023-12-04 DIAGNOSIS — Z30.430 ENCOUNTER FOR IUD INSERTION: Primary | ICD-10-CM

## 2023-12-04 RX ORDER — MISOPROSTOL 200 UG/1
TABLET ORAL
Qty: 3 TABLET | Refills: 0 | Status: SHIPPED | OUTPATIENT
Start: 2023-12-06 | End: 2023-12-07

## 2023-12-07 ENCOUNTER — PROCEDURE VISIT (OUTPATIENT)
Dept: OBGYN CLINIC | Facility: CLINIC | Age: 33
End: 2023-12-07
Payer: COMMERCIAL

## 2023-12-07 VITALS
WEIGHT: 149.6 LBS | DIASTOLIC BLOOD PRESSURE: 70 MMHG | SYSTOLIC BLOOD PRESSURE: 108 MMHG | HEIGHT: 66 IN | BODY MASS INDEX: 24.04 KG/M2

## 2023-12-07 DIAGNOSIS — Z30.430 ENCOUNTER FOR IUD INSERTION: Primary | ICD-10-CM

## 2023-12-07 PROCEDURE — 58300 INSERT INTRAUTERINE DEVICE: CPT | Performed by: OBSTETRICS & GYNECOLOGY

## 2023-12-07 NOTE — PROGRESS NOTES
Iud insertions    Date/Time: 12/7/2023 7:15 AM    Performed by: Court Siu MD  Authorized by: Court Siu MD    Verbal consent obtained?: Yes    Risks and benefits: Risks, benefits and alternatives were discussed    Consent given by:  Patient  Time Out:     Time out performed at:  12/7/2023 7:25 AM  Patient states understanding of procedure being performed: Yes    Patient's understanding of procedure matches consent: Yes    Procedure consent matches procedure scheduled: Yes    Relevant documents present and verified: Yes    Site marked: No    Radiology Images displayed and confirmed. If images not available, report reviewed: No    Patient identity confirmed:  Verbally with patient  Procedure:     Pelvic exam performed: yes      Negative GC/chlamydia test: Low risk. Negative urine pregnancy test: Low risk. Negative serum pregnancy test: hCG level normal on 11/27/2023. Cervix cleaned and prepped: yes      Speculum placed in vagina: yes      Allis applied to cervix: no      Uterus sounded: yes      Uterus sound depth (cm):  8    IUD inserted with no complications: yes      IUD type:  Eliud Press    Strings trimmed: yes    Post-procedure:     Patient tolerated procedure well: yes      Patient will follow up after next period: yes    Comments:      Abdominal ultrasound performed close procedure showed the IUD was in the endometrial cavity. There was no evidence of perforation. The patient Toller procedure well. Booklet was given. Return my office in 5 weeks for follow-up.

## 2023-12-07 NOTE — PATIENT INSTRUCTIONS
Patient tolerated the insertion of the [facility] IUD without complication. Transabdominal ultrasound postprocedure show proper placement without evidence of perforation. She will return to my office in 5 weeks for reevaluation. Instruction booklet was given.

## 2024-02-08 ENCOUNTER — OFFICE VISIT (OUTPATIENT)
Dept: OBGYN CLINIC | Facility: CLINIC | Age: 34
End: 2024-02-08
Payer: COMMERCIAL

## 2024-02-08 VITALS
SYSTOLIC BLOOD PRESSURE: 112 MMHG | WEIGHT: 142 LBS | BODY MASS INDEX: 22.82 KG/M2 | HEIGHT: 66 IN | DIASTOLIC BLOOD PRESSURE: 70 MMHG

## 2024-02-08 DIAGNOSIS — Z30.011 ENCOUNTER FOR INITIAL PRESCRIPTION OF CONTRACEPTIVE PILLS: ICD-10-CM

## 2024-02-08 DIAGNOSIS — Z30.432 ENCOUNTER FOR IUD REMOVAL: Primary | ICD-10-CM

## 2024-02-08 PROCEDURE — 58301 REMOVE INTRAUTERINE DEVICE: CPT | Performed by: OBSTETRICS & GYNECOLOGY

## 2024-02-08 PROCEDURE — 99214 OFFICE O/P EST MOD 30 MIN: CPT | Performed by: OBSTETRICS & GYNECOLOGY

## 2024-02-08 RX ORDER — FOLIC ACID 1 MG/1
TABLET ORAL DAILY
COMMUNITY

## 2024-02-08 RX ORDER — NORGESTIMATE AND ETHINYL ESTRADIOL 0.25-0.035
1 KIT ORAL DAILY
Qty: 84 TABLET | Refills: 1 | Status: SHIPPED | OUTPATIENT
Start: 2024-02-08

## 2024-02-08 NOTE — PATIENT INSTRUCTIONS
This patient was not happy with her IUD because of some GI side effects.  She is requesting removal of the IUD.  She would now like to hold on the birth control pill.  She is on a birth control pill in the past.  She was reminded to start the birth control pill the first Sunday after her next menstrual cycle.  She was also informed that she engages in sexual contact prior to the first month she should use condoms or withdrawal.  She will keep informed of progress.  She will keep her regular scheduled exam.  If the GI side effects do not go away particular the reflux she will see her primary care physician.

## 2024-02-08 NOTE — PROGRESS NOTES
Iud removal    Date/Time: 2/8/2024 8:45 AM    Performed by: Yariel Yun MD  Authorized by: Yariel Yun MD  Universal Protocol:  Timeout called at: 2/8/2024 8:50 AM.  Patient understanding: patient states understanding of the procedure being performed  Patient consent: the patient's understanding of the procedure matches consent given  Procedure consent: procedure consent matches procedure scheduled  Relevant documents: relevant documents present and verified  Site marked: the operative site was not marked  Radiology Images displayed and confirmed. If images not available, report reviewed: imaging studies not available  Patient identity confirmed: verbally with patient    Procedure:     Removed with no complications: yes      Other reason for removal:  Patient request.  She states she is feels nausea and abdominal pains with IUD insertion requesting it be removed.  Comments:      The patient states she is feels unhappy with the IUD.  Currently she is not in a sexual relationship.  She is complaining of increasing nausea vomiting.  She is taken Tums over-the-counter.  I talked about maybe using Prilosec OTC.  The IUD was removed without complications.  Was shown to the patient prior to disposal.  She will now start birth control pill.  She is aware she must use a backup method the first month.  She will keep me informed of her progress.

## 2024-05-07 ENCOUNTER — APPOINTMENT (EMERGENCY)
Dept: CT IMAGING | Facility: HOSPITAL | Age: 34
End: 2024-05-07
Payer: COMMERCIAL

## 2024-05-07 ENCOUNTER — HOSPITAL ENCOUNTER (EMERGENCY)
Facility: HOSPITAL | Age: 34
Discharge: HOME | End: 2024-05-07
Attending: EMERGENCY MEDICINE
Payer: COMMERCIAL

## 2024-05-07 VITALS
DIASTOLIC BLOOD PRESSURE: 78 MMHG | HEART RATE: 79 BPM | TEMPERATURE: 98.1 F | OXYGEN SATURATION: 99 % | RESPIRATION RATE: 18 BRPM | SYSTOLIC BLOOD PRESSURE: 137 MMHG

## 2024-05-07 DIAGNOSIS — R10.9 ABDOMINAL PAIN: ICD-10-CM

## 2024-05-07 DIAGNOSIS — K52.9 ENTERITIS: Primary | ICD-10-CM

## 2024-05-07 DIAGNOSIS — R11.0 NAUSEA: ICD-10-CM

## 2024-05-07 LAB
ALBUMIN SERPL BCP-MCNC: 4.2 G/DL (ref 3.5–5)
ALP SERPL-CCNC: 63 U/L (ref 34–104)
ALT SERPL W P-5'-P-CCNC: 10 U/L (ref 7–52)
ANION GAP SERPL CALCULATED.3IONS-SCNC: 3 MMOL/L (ref 4–13)
AST SERPL W P-5'-P-CCNC: 13 U/L (ref 13–39)
BASOPHILS # BLD AUTO: 0.09 THOUSANDS/ÂΜL (ref 0–0.1)
BASOPHILS NFR BLD AUTO: 1 % (ref 0–1)
BILIRUB SERPL-MCNC: 0.72 MG/DL (ref 0.2–1)
BILIRUB UR QL STRIP: NEGATIVE
BUN SERPL-MCNC: 16 MG/DL (ref 5–25)
CALCIUM SERPL-MCNC: 8.9 MG/DL (ref 8.4–10.2)
CHLORIDE SERPL-SCNC: 106 MMOL/L (ref 96–108)
CLARITY UR: CLEAR
CO2 SERPL-SCNC: 28 MMOL/L (ref 21–32)
COLOR UR: NORMAL
CREAT SERPL-MCNC: 0.83 MG/DL (ref 0.6–1.3)
EOSINOPHIL # BLD AUTO: 0.26 THOUSAND/ÂΜL (ref 0–0.61)
EOSINOPHIL NFR BLD AUTO: 4 % (ref 0–6)
ERYTHROCYTE [DISTWIDTH] IN BLOOD BY AUTOMATED COUNT: 13.6 % (ref 11.6–15.1)
EXT PREGNANCY TEST URINE: NEGATIVE
EXT. CONTROL: NORMAL
GFR SERPL CREATININE-BSD FRML MDRD: 92 ML/MIN/1.73SQ M
GLUCOSE SERPL-MCNC: 103 MG/DL (ref 65–140)
GLUCOSE UR STRIP-MCNC: NEGATIVE MG/DL
HCT VFR BLD AUTO: 39 % (ref 34.8–46.1)
HGB BLD-MCNC: 12.7 G/DL (ref 11.5–15.4)
HGB UR QL STRIP.AUTO: NEGATIVE
HOLD SPECIMEN: NORMAL
IMM GRANULOCYTES # BLD AUTO: 0.04 THOUSAND/UL (ref 0–0.2)
IMM GRANULOCYTES NFR BLD AUTO: 1 % (ref 0–2)
KETONES UR STRIP-MCNC: NEGATIVE MG/DL
LEUKOCYTE ESTERASE UR QL STRIP: NEGATIVE
LIPASE SERPL-CCNC: 21 U/L (ref 11–82)
LYMPHOCYTES # BLD AUTO: 2.13 THOUSANDS/ÂΜL (ref 0.6–4.47)
LYMPHOCYTES NFR BLD AUTO: 29 % (ref 14–44)
MCH RBC QN AUTO: 28.5 PG (ref 26.8–34.3)
MCHC RBC AUTO-ENTMCNC: 32.6 G/DL (ref 31.4–37.4)
MCV RBC AUTO: 88 FL (ref 82–98)
MONOCYTES # BLD AUTO: 0.47 THOUSAND/ÂΜL (ref 0.17–1.22)
MONOCYTES NFR BLD AUTO: 6 % (ref 4–12)
NEUTROPHILS # BLD AUTO: 4.43 THOUSANDS/ÂΜL (ref 1.85–7.62)
NEUTS SEG NFR BLD AUTO: 59 % (ref 43–75)
NITRITE UR QL STRIP: NEGATIVE
NRBC BLD AUTO-RTO: 0 /100 WBCS
PH UR STRIP.AUTO: 5.5 [PH]
PLATELET # BLD AUTO: 305 THOUSANDS/UL (ref 149–390)
PMV BLD AUTO: 10.2 FL (ref 8.9–12.7)
POTASSIUM SERPL-SCNC: 3.8 MMOL/L (ref 3.5–5.3)
PROT SERPL-MCNC: 6.9 G/DL (ref 6.4–8.4)
PROT UR STRIP-MCNC: NEGATIVE MG/DL
RBC # BLD AUTO: 4.45 MILLION/UL (ref 3.81–5.12)
SODIUM SERPL-SCNC: 137 MMOL/L (ref 135–147)
SP GR UR STRIP.AUTO: 1.02 (ref 1–1.03)
UROBILINOGEN UR STRIP-ACNC: <2 MG/DL
WBC # BLD AUTO: 7.42 THOUSAND/UL (ref 4.31–10.16)

## 2024-05-07 PROCEDURE — 36415 COLL VENOUS BLD VENIPUNCTURE: CPT

## 2024-05-07 PROCEDURE — 93005 ELECTROCARDIOGRAM TRACING: CPT

## 2024-05-07 PROCEDURE — 83690 ASSAY OF LIPASE: CPT | Performed by: EMERGENCY MEDICINE

## 2024-05-07 PROCEDURE — 81025 URINE PREGNANCY TEST: CPT

## 2024-05-07 PROCEDURE — 96374 THER/PROPH/DIAG INJ IV PUSH: CPT

## 2024-05-07 PROCEDURE — 81003 URINALYSIS AUTO W/O SCOPE: CPT

## 2024-05-07 PROCEDURE — 80053 COMPREHEN METABOLIC PANEL: CPT | Performed by: EMERGENCY MEDICINE

## 2024-05-07 PROCEDURE — 99285 EMERGENCY DEPT VISIT HI MDM: CPT | Performed by: EMERGENCY MEDICINE

## 2024-05-07 PROCEDURE — 96375 TX/PRO/DX INJ NEW DRUG ADDON: CPT

## 2024-05-07 PROCEDURE — 85025 COMPLETE CBC W/AUTO DIFF WBC: CPT | Performed by: EMERGENCY MEDICINE

## 2024-05-07 PROCEDURE — 74177 CT ABD & PELVIS W/CONTRAST: CPT

## 2024-05-07 PROCEDURE — 99284 EMERGENCY DEPT VISIT MOD MDM: CPT

## 2024-05-07 RX ORDER — FAMOTIDINE 20 MG/1
20 TABLET, FILM COATED ORAL DAILY
Qty: 30 TABLET | Refills: 0 | Status: SHIPPED | OUTPATIENT
Start: 2024-05-07 | End: 2024-06-06

## 2024-05-07 RX ORDER — MAGNESIUM HYDROXIDE/ALUMINUM HYDROXICE/SIMETHICONE 120; 1200; 1200 MG/30ML; MG/30ML; MG/30ML
30 SUSPENSION ORAL ONCE
Status: COMPLETED | OUTPATIENT
Start: 2024-05-07 | End: 2024-05-07

## 2024-05-07 RX ORDER — ONDANSETRON 2 MG/ML
4 INJECTION INTRAMUSCULAR; INTRAVENOUS ONCE
Status: COMPLETED | OUTPATIENT
Start: 2024-05-07 | End: 2024-05-07

## 2024-05-07 RX ORDER — FAMOTIDINE 10 MG/ML
20 INJECTION, SOLUTION INTRAVENOUS ONCE
Status: COMPLETED | OUTPATIENT
Start: 2024-05-07 | End: 2024-05-07

## 2024-05-07 RX ORDER — ONDANSETRON 4 MG/1
4 TABLET, FILM COATED ORAL EVERY 8 HOURS PRN
Qty: 15 TABLET | Refills: 0 | Status: SHIPPED | OUTPATIENT
Start: 2024-05-07 | End: 2024-05-14

## 2024-05-07 RX ADMIN — ONDANSETRON 4 MG: 2 INJECTION INTRAMUSCULAR; INTRAVENOUS at 09:14

## 2024-05-07 RX ADMIN — FAMOTIDINE 20 MG: 10 INJECTION INTRAVENOUS at 09:14

## 2024-05-07 RX ADMIN — ALUMINUM HYDROXIDE, MAGNESIUM HYDROXIDE, DIMETHICONE 30 ML: 200; 200; 20 LIQUID ORAL at 09:13

## 2024-05-07 RX ADMIN — IOHEXOL 80 ML: 350 INJECTION, SOLUTION INTRAVENOUS at 10:05

## 2024-05-07 NOTE — DISCHARGE INSTRUCTIONS
For any nausea or vomiting, can take Zofran 4 mg every 8 hours as needed  For any burning abdominal pain, can take Pepcid 20 mg  Return to ED if any worsening symptoms  Follow-up with your primary care physician this week

## 2024-05-07 NOTE — ED PROVIDER NOTES
History  Chief Complaint   Patient presents with    Abdominal Pain     Pt presents with right sided abdominal pain that travels into her pelvic area that began x2 weeks ago but has gotten significantly worse since last night. States she also has CP/burning. +nausea +SOB.     34-year-old female presenting the ED for evaluation of abdominal pain, nausea.  Symptoms have been ongoing for the past 2 weeks.  Abdominal pain initially started in the left lower quadrant.  Since yesterday, has had right upper quadrant abdominal pain which is radiating into her chest.  Pain is described as a burning sensation, worse after eating, worse with menstrual periods, is constant.  Associated nausea. LMP 1.5 weeks ago.  History of liposuction and .  Last bowel movement was yesterday.  Passing flatus normally.  Felt constipated yesterday.  No vomiting.  Not sexually active.  No vaginal bleeding or discharge.  No fevers.  No chills.  No urinary symptoms.  No history of kidney stones.  No history of gallstones.  No history of ovarian cyst.  No history of STI.        Prior to Admission Medications   Prescriptions Last Dose Informant Patient Reported? Taking?   Prenatal Vit-Fe Fumarate-FA (PRENATAL VITAMINS PO)  Self Yes No   Sig: Take by mouth   Patient not taking: Reported on 2023   acetaminophen (TYLENOL) 500 mg tablet  Self Yes No   Sig: Take 1,000 mg by mouth every 6 (six) hours as needed for mild pain   docusate sodium (COLACE) 100 mg capsule  Self No No   Sig: Take 1 capsule (100 mg total) by mouth 2 (two) times a day   Patient not taking: Reported on 10/28/2022   ferrous sulfate (MISHA-IN-SOL) 75 (15 Fe) mg/mL drops  Self No No   Sig: Take 2 mL (30 mg of iron total) by mouth daily   folic acid (FOLVITE) 1 mg tablet   Yes No   Sig: Take by mouth daily   ibuprofen (MOTRIN) 600 mg tablet  Self No No   Sig: Take 1 tablet (600 mg total) by mouth every 6 (six) hours as needed for mild pain   miSOPROStol (Cytotec) 200 mcg  "tablet   No No   Sig: These take 3 tablets on the evening of the  in preparation for encounter for IUD insertion on the seventh Do not start before 2023.   Patient not taking: Reported on 2023   norgestimate-ethinyl estradiol (Sprintec 28) 0.25-35 MG-MCG per tablet   No No   Sig: Take 1 tablet by mouth daily   ondansetron (ZOFRAN) 4 mg tablet  Self No No   Sig: Take 1 tablet (4 mg total) by mouth every 8 (eight) hours as needed for nausea or vomiting   Patient not taking: Reported on 2024      Facility-Administered Medications: None       Past Medical History:   Diagnosis Date    Varicella     disease as a child       Past Surgical History:   Procedure Laterality Date     SECTION      COSMETIC SURGERY      Pt states \" Brazilian Butt Lift\"    LIPOSUCTION      OK  DELIVERY ONLY N/A 2023    Procedure:  SECTION () REPEAT;  Surgeon: Renan Garcia MD;  Location: [facility];  Service: Obstetrics    OK INSERTION INTRAUTERINE DEVICE IUD N/A 2023    Procedure: INSERTION OF INTRAUTERINE DEVICE (IUD);  Surgeon: Renan Garcia MD;  Location: [facility] ;  Service: Obstetrics       Family History   Problem Relation Age of Onset    Ovarian cysts Mother     No Known Problems Father     No Known Problems Sister     No Known Problems Sister     No Known Problems Brother     No Known Problems Brother     No Known Problems Brother     No Known Problems Brother     No Known Problems Son     No Known Problems Son     Breast cancer Neg Hx     Colon cancer Neg Hx     Ovarian cancer Neg Hx      I have reviewed and agree with the history as documented.    E-Cigarette/Vaping    E-Cigarette Use Never User      E-Cigarette/Vaping Substances    Nicotine No     THC No     CBD No     Flavoring No     Other No      Social History     Tobacco Use    Smoking status: Former     Current packs/day: 0.00     Types: Cigarettes     Quit date:      Years since quittin.3    " Smokeless tobacco: Never    Tobacco comments:     Former smoker - As per [place]    Vaping Use    Vaping status: Never Used   Substance Use Topics    Alcohol use: Never    Drug use: Not Currently     Types: Marijuana        Review of Systems   Constitutional:  Negative for chills and fever.   HENT:  Negative for ear pain and sore throat.    Eyes:  Negative for pain and visual disturbance.   Respiratory:  Negative for cough and shortness of breath.    Cardiovascular:  Negative for chest pain and palpitations.   Gastrointestinal:  Positive for abdominal pain, constipation and nausea. Negative for vomiting.   Genitourinary:  Negative for dysuria and hematuria.   Musculoskeletal:  Negative for arthralgias and back pain.   Skin:  Negative for color change and rash.   Neurological:  Negative for seizures and syncope.   All other systems reviewed and are negative.      Physical Exam  ED Triage Vitals [05/07/24 0819]   Temperature Pulse Respirations Blood Pressure SpO2   98.1 °F (36.7 °C) 79 18 137/78 99 %      Temp Source Heart Rate Source Patient Position - Orthostatic VS BP Location FiO2 (%)   Oral Monitor Sitting Right arm --      Pain Score       5             Orthostatic Vital Signs  Vitals:    05/07/24 0819   BP: 137/78   Pulse: 79   Patient Position - Orthostatic VS: Sitting       Physical Exam  Vitals and nursing note reviewed.   Constitutional:       General: She is not in acute distress.     Appearance: She is well-developed.   HENT:      Head: Normocephalic and atraumatic.   Eyes:      Conjunctiva/sclera: Conjunctivae normal.   Cardiovascular:      Rate and Rhythm: Normal rate and regular rhythm.      Pulses:           Radial pulses are 2+ on the right side and 2+ on the left side.      Heart sounds: Normal heart sounds and S2 normal. No murmur heard.  Pulmonary:      Effort: Pulmonary effort is normal. No respiratory distress.      Breath sounds: Normal breath sounds.   Abdominal:      Palpations: Abdomen is  soft.      Tenderness: There is abdominal tenderness in the right upper quadrant, suprapubic area and left lower quadrant.      Comments: Diffuse abdominal tenderness to palpation, worse in RUQ LLQ and suprapubic region.  Nonperitoneal.  No guarding or rebound.  No CVA tenderness bilaterally   Musculoskeletal:         General: No swelling.      Cervical back: Neck supple.      Right lower leg: No edema.      Left lower leg: No edema.   Skin:     General: Skin is warm and dry.      Capillary Refill: Capillary refill takes less than 2 seconds.   Neurological:      Mental Status: She is alert.   Psychiatric:         Mood and Affect: Mood normal.         ED Medications  Medications   ondansetron (ZOFRAN) injection 4 mg (4 mg Intravenous Given 5/7/24 0914)   Famotidine (PF) (PEPCID) injection 20 mg (20 mg Intravenous Given 5/7/24 0914)   aluminum-magnesium hydroxide-simethicone (MAALOX) oral suspension 30 mL (30 mL Oral Given 5/7/24 0913)   iohexol (OMNIPAQUE) 350 MG/ML injection (MULTI-DOSE) 80 mL (80 mL Intravenous Given 5/7/24 1005)       Diagnostic Studies  Results Reviewed       Procedure Component Value Units Date/Time    Trauma tubes on hold [[id number]] Collected: 05/07/24 0832    Lab Status: Final result Specimen: Blood from Arm, Right Updated: 05/07/24 1001    Narrative:      The following orders were created for panel order Trauma tubes on hold.  Procedure                               Abnormality         Status                     ---------                               -----------         ------                     Light Blue Top on hold[[id number]]                           Final result               Green / Black tube on hold[[id number]]                       Final result                 Please view results for these tests on the individual orders.    POCT pregnancy, urine [[id number]]  (Normal) Resulted: 05/07/24 0937    Lab Status: Final result Updated: 05/07/24 0937     EXT Preg Test, Ur Negative      Control Valid    UA w Reflex to Microscopic w Reflex to Culture [[id number]] Collected: 05/07/24 0912    Lab Status: Final result Specimen: Urine, Clean Catch Updated: 05/07/24 0929     Color, UA Light Yellow     Clarity, UA Clear     Specific Gravity, UA 1.021     pH, UA 5.5     Leukocytes, UA Negative     Nitrite, UA Negative     Protein, UA Negative mg/dl      Glucose, UA Negative mg/dl      Ketones, UA Negative mg/dl      Urobilinogen, UA <2.0 mg/dl      Bilirubin, UA Negative     Occult Blood, UA Negative    Comprehensive metabolic panel [[id number]]  (Abnormal) Collected: 05/07/24 0832    Lab Status: Final result Specimen: Blood from Arm, Right Updated: 05/07/24 0859     Sodium 137 mmol/L      Potassium 3.8 mmol/L      Chloride 106 mmol/L      CO2 28 mmol/L      ANION GAP 3 mmol/L      BUN 16 mg/dL      Creatinine 0.83 mg/dL      Glucose 103 mg/dL      Calcium 8.9 mg/dL      AST 13 U/L      ALT 10 U/L      Alkaline Phosphatase 63 U/L      Total Protein 6.9 g/dL      Albumin 4.2 g/dL      Total Bilirubin 0.72 mg/dL      eGFR 92 ml/min/1.73sq m     Narrative:      National Kid[employer] guidelines for Chronic Kidney Disease (CKD):     Stage 1 with normal or high GFR (GFR > 90 mL/min/1.73 square meters)    Stage 2 Mild CKD (GFR = 60-89 mL/min/1.73 square meters)    Stage 3A Moderate CKD (GFR = 45-59 mL/min/1.73 square meters)    Stage 3B Moderate CKD (GFR = 30-44 mL/min/1.73 square meters)    Stage 4 Severe CKD (GFR = 15-29 mL/min/1.73 square meters)    Stage 5 End Stage CKD (GFR <15 mL/min/1.73 square meters)  Note: GFR calculation is accurate only with a steady state creatinine    Lipase [[id number]]  (Normal) Collected: 05/07/24 0832    Lab Status: Final result Specimen: Blood from Arm, Right Updated: 05/07/24 0859     Lipase 21 u/L     CBC and differential [[id number]] Collected: 05/07/24 0832    Lab Status: Final result Specimen: Blood from Arm, Right Updated: 05/07/24 0850     WBC 7.42 Thousand/uL       RBC 4.45 Million/uL      Hemoglobin 12.7 g/dL      Hematocrit 39.0 %      MCV 88 fL      MCH 28.5 pg      MCHC 32.6 g/dL      RDW 13.6 %      MPV 10.2 fL      Platelets 305 Thousands/uL      nRBC 0 /100 WBCs      Segmented % 59 %      Immature Grans % 1 %      Lymphocytes % 29 %      Monocytes % 6 %      Eosinophils Relative 4 %      Basophils Relative 1 %      Absolute Neutrophils 4.43 Thousands/µL      Absolute Immature Grans 0.04 Thousand/uL      Absolute Lymphocytes 2.13 Thousands/µL      Absolute Monocytes 0.47 Thousand/µL      Eosinophils Absolute 0.26 Thousand/µL      Basophils Absolute 0.09 Thousands/µL                    CT abdomen pelvis with contrast   Final Result by Shamar Hodgson MD (05/07 1050)      1. Mildly distended small bowel loops in the left hemiabdomen with scattered air-fluid levels.  No abrupt transition point. Appearance is nonspecific but could be related to enteritis. Otherwise no acute findings in the abdomen/pelvis.         Workstation performed: [id number]               Procedures  ECG 12 Lead Documentation Only    Date/Time: 5/7/2024 8:51 AM    Performed by: Briseyda Agarwal MD  Authorized by: Briseyda Agarwal MD    Patient location:  ED  Previous ECG:     Previous ECG:  Compared to current    Comparison ECG info:  12/30/2022    Similarity:  Changes noted (PACs no longer present)  Interpretation:     Interpretation: normal    Rate:     ECG rate:  69    ECG rate assessment: normal    Rhythm:     Rhythm: sinus rhythm    Ectopy:     Ectopy: none    QRS:     QRS axis:  Normal    QRS intervals:  Normal  Conduction:     Conduction: normal    ST segments:     ST segments:  Normal  T waves:     T waves: normal          ED Course  ED Course as of 05/07/24 1705   Tue May 07, 2024   1102 Patient reevaluation: Currently denies all symptoms, feels significantly improved after the above medications.  Was updated about all labs and imaging.  Feels comfortable going home.  Will p.o. challenge  prior to discharge.   1138 Tolerating p.o. without difficulty.  Will discharge.                                       Medical Decision Making  34-year-old female presented ED for evaluation of abdominal pain and nausea for the past 2 weeks.    Differentials including but not limited to: GERD, gastritis, cholecystitis, gallstones, cystitis, pyelonephritis, kidney stones, diverticulitis, constipation, pregnancy, ectopic, diverticulosis, colitis, gastroenteritis, ACS, arrhythmia. Doubt torsion given duration of sx's and constant pain    Not sexually active, no vaginal discharge; doubt STI.    Will obtain labs, ECG, CT abdomen, treat sx's with Pepcid, Mylanta, Zofran.    All labs normal and reassuring.  CT abdomen with enteritis without obstruction.  Patient symptoms felt improved with the above medications. Tolerating PO without issues by time of discharge.    Patient was discharged home with outpatient follow, strict return precautions.  Prescription for Zofran and Pepcid sent to pharmacy.      Amount and/or Complexity of Data Reviewed  Labs: ordered.  Radiology: ordered.    Risk  OTC drugs.  Prescription drug management.          Disposition  Final diagnoses:   Abdominal pain   Nausea   Enteritis     Time reflects when diagnosis was documented in both MDM as applicable and the Disposition within this note       Time User Action Codes Description Comment    5/7/2024 11:38 AM Rendano, Briseyda Add [K52.9] Colitis     5/7/2024 11:38 AM Rendano, Briseyda Add [R10.9] Abdominal pain     5/7/2024 11:38 AM Rendano, Briseyda Add [R11.0] Nausea     5/7/2024  5:02 PM Rendano, Briseyda Add [K52.9] Enteritis     5/7/2024  5:02 PM Rendano, Briseyda Modify [R10.9] Abdominal pain     5/7/2024  5:02 PM Rendano, Briseyda Remove [K52.9] Colitis     5/7/2024  5:02 PM Rendano, Briseyda Modify [R10.9] Abdominal pain     5/7/2024  5:02 PM Rendano, Briseyda Modify [K52.9] Enteritis           ED Disposition       ED Disposition   Discharge    Condition   Stable     Date/Time   Tue May 7, 2024 11:38 AM    Comment   Angelina Joaquin discharge to home/self care.                   Follow-up Information       Follow up With Specialties Details Why Contact Info    Ari Bailey MD Family Medicine Schedule an appointment as soon as possible for a visit today for [facility] [address] [facility]  [address]  [place] [id number] [phone]              Discharge Medication List as of 5/7/2024 11:43 AM        START taking these medications    Details   famotidine (PEPCID) 20 mg tablet Take 1 tablet (20 mg total) by mouth daily, Starting Tue 5/7/2024, Until Thu 6/6/2024, Normal      !! ondansetron (Zofran) 4 mg tablet Take 1 tablet (4 mg total) by mouth every 8 (eight) hours as needed for nausea or vomiting for up to 7 days, Starting Tue 5/7/2024, Until Tue 5/14/2024 at 2359, Normal       !! - Potential duplicate medications found. Please discuss with provider.        CONTINUE these medications which have NOT CHANGED    Details   acetaminophen (TYLENOL) 500 mg tablet Take 1,000 mg by mouth every 6 (six) hours as needed for mild pain, Historical Med      docusate sodium (COLACE) 100 mg capsule Take 1 capsule (100 mg total) by mouth 2 (two) times a day, Starting Thu 10/13/2022, Normal      ferrous sulfate (MISHA-IN-SOL) 75 (15 Fe) mg/mL drops Take 2 mL (30 mg of iron total) by mouth daily, Starting Fri 11/11/2022, Normal      folic acid (FOLVITE) 1 mg tablet Take by mouth daily, Historical Med      ibuprofen (MOTRIN) 600 mg tablet Take 1 tablet (600 mg total) by mouth every 6 (six) hours as needed for mild pain, Starting Mon 1/9/2023, No Print      miSOPROStol (Cytotec) 200 mcg tablet These take 3 tablets on the evening of the December 6 in preparation for encounter for IUD insertion on the seventh Do not start before December 6, 2023., Normal      norgestimate-ethinyl estradiol (Sprintec 28) 0.25-35 MG-MCG per tablet Take 1 tablet by mouth daily, Starting Thu 2/8/2024, Normal      !!  ondansetron (ZOFRAN) 4 mg tablet Take 1 tablet (4 mg total) by mouth every 8 (eight) hours as needed for nausea or vomiting, Starting Wed 12/28/2022, Normal      Prenatal Vit-Fe Fumarate-FA (PRENATAL VITAMINS PO) Take by mouth, Historical Med       !! - Potential duplicate medications found. Please discuss with provider.        No discharge procedures on file.    PDMP Review       None             ED Provider  Attending physically available and evaluated Angelina Joaquin. I managed the patient along with the ED Attending.    Electronically Signed by           Briseyda Agarwal MD  05/07/24 9849

## 2024-05-07 NOTE — Clinical Note
Angelina Joaquin was seen and treated in our emergency department on 5/7/2024.                Diagnosis:     Angelina  .    She may return on this date: 05/09/2024    Can return sooner if feeling okay     If you have any questions or concerns, please don't hesitate to call.      Briseyda Agarwal MD    ______________________________           _______________          _______________  Hospital Representative                              Date                                Time

## 2024-05-07 NOTE — ED ATTENDING ATTESTATION
5/7/2024  I, Catie Christensen MD, saw and evaluated the patient. I have discussed the patient with the resident/non-physician practitioner and agree with the resident's/non-physician practitioner's findings, Plan of Care, and MDM as documented in the resident's/non-physician practitioner's note, except where noted. All available labs and Radiology studies were reviewed.  I was present for key portions of any procedure(s) performed by the resident/non-physician practitioner and I was immediately available to provide assistance.       At this point I agree with the current assessment done in the Emergency Department.  I have conducted an independent evaluation of this patient a history and physical is as follows: LLQ abdominal pain x 2 weeks. Intermittent. Yesterday started with RUQ abdominal pain. Some burning in chest. Some relief with TUMS. LMP 1.5 weeks ago. Heart RRR, lungs CTA, abdomen tender in LLQ and RUQ. No rebound or guarding.  Plan: Labs, CT abdomen.    ED Course         Critical Care Time  Procedures

## 2024-05-08 LAB
ATRIAL RATE: 69 BPM
P AXIS: 59 DEGREES
PR INTERVAL: 154 MS
QRS AXIS: 72 DEGREES
QRSD INTERVAL: 70 MS
QT INTERVAL: 400 MS
QTC INTERVAL: 428 MS
T WAVE AXIS: 57 DEGREES
VENTRICULAR RATE: 69 BPM

## 2024-05-08 PROCEDURE — 93010 ELECTROCARDIOGRAM REPORT: CPT | Performed by: INTERNAL MEDICINE

## 2024-05-13 ENCOUNTER — TELEPHONE (OUTPATIENT)
Dept: GASTROENTEROLOGY | Facility: MEDICAL CENTER | Age: 34
End: 2024-05-13

## 2024-05-13 ENCOUNTER — OFFICE VISIT (OUTPATIENT)
Dept: GASTROENTEROLOGY | Facility: MEDICAL CENTER | Age: 34
End: 2024-05-13
Payer: COMMERCIAL

## 2024-05-13 VITALS
DIASTOLIC BLOOD PRESSURE: 69 MMHG | TEMPERATURE: 98.4 F | HEART RATE: 75 BPM | WEIGHT: 149.6 LBS | SYSTOLIC BLOOD PRESSURE: 103 MMHG | HEIGHT: 66 IN | BODY MASS INDEX: 24.04 KG/M2 | OXYGEN SATURATION: 98 %

## 2024-05-13 DIAGNOSIS — R10.13 EPIGASTRIC PAIN: Primary | ICD-10-CM

## 2024-05-13 DIAGNOSIS — R11.0 NAUSEA: ICD-10-CM

## 2024-05-13 DIAGNOSIS — R93.5 ABNORMAL CT OF THE ABDOMEN: ICD-10-CM

## 2024-05-13 PROCEDURE — 99204 OFFICE O/P NEW MOD 45 MIN: CPT | Performed by: NURSE PRACTITIONER

## 2024-05-13 RX ORDER — OMEPRAZOLE 20 MG/1
20 CAPSULE, DELAYED RELEASE ORAL DAILY
Qty: 30 CAPSULE | Refills: 3 | Status: SHIPPED | OUTPATIENT
Start: 2024-05-13

## 2024-05-13 RX ORDER — CALCIUM CARBONATE 500 MG/1
1 TABLET, CHEWABLE ORAL AS NEEDED
COMMUNITY

## 2024-05-13 RX ORDER — POLYETHYLENE GLYCOL 3350, SODIUM SULFATE ANHYDROUS, SODIUM BICARBONATE, SODIUM CHLORIDE, POTASSIUM CHLORIDE 236; 22.74; 6.74; 5.86; 2.97 G/4L; G/4L; G/4L; G/4L; G/4L
4000 POWDER, FOR SOLUTION ORAL ONCE
Qty: 4000 ML | Refills: 0 | Status: SHIPPED | OUTPATIENT
Start: 2024-05-13 | End: 2024-05-13

## 2024-05-13 NOTE — PROGRESS NOTES
[facility] - Outpatient Consultation  Angelina Joaquin 34 y.o. female MRN: [id number]  Encounter: [id number]          ASSESSMENT AND PLAN:    Angelina Joaquin is a 34 y.o. female who presents with complaint of abdominal pain and nausea.    1.  Nausea  2.  Epigastric pain  3.  Abnormal CT abdomen  4.  Lower abdominal pain    Started approximately 3 weeks ago with upper and lower abdominal cramping associated with nausea.  Went to the ER.  Labs were normal as well as urinalysis normal.  CT noted mildly distended small bowel loops in the left Blane abdomen with scattered air-fluid levels.  Appearance is nonspecific could be related to enteritis.  No recent travel, sick contacts or new medications.  Reports the lower abdominal cramping worsened when she had her IUD inserted.  Recent labs in ER were normal.  Tried famotidine with no help.  Is reporting chronic heartburn/reflux for several weeks now.  Drinks 2 to 3 cups of caffeine per day.  Rare NSAID and alcohol use.  Rare spicy foods.  Never had any EGD or colonoscopy.  Will rule out PUD, gastritis/esophagitis, H. pylori, celiac, IBD and neoplasm.    -Stool for fecal calprotectin, stool for H. pylori, celiac panel  -Start omeprazole 20 mg daily  -EGD and colonoscopy with GoLytely/Dulcolax prep  -Follow-up in office after testing    I obtained informed consent from the patient. The risks/benefits/alternatives of the procedure were discussed with the patient. Risks included, but not limited to, infection, bleeding, perforation, injury to organs in the abdomen, missed lesion and incomplete procedure were discussed. Patient was agreeable and electronic signature was obtained.     ______________________________________________________________________    HPI:    Angelina Joaquin is a 34 y.o. female who presents with complaint of abdominal pain and nausea. She has a past medical history of iron deficiency anemia.    Presented to the ER 5/7/2024  evaluation of abdominal pain and nausea.  This has been ongoing for approximately 2 weeks.  The pain initially started in the left lower quadrant and then radiated to the right upper quadrant and to her chest.  It is described as a burning sensation, worse after eating, worse with menstrual cycle.  CT in the ER noted Mildly distended small bowel loops in the left hemiabdomen with scattered air-fluid levels. No abrupt transition point. Appearance is nonspecific but could be related to enteritis. Otherwise no acute findings in the abdomen/pelvis.  Labs noted CMP normal, CBC normal, urinalysis normal and strep negative.  No recent travel, sick contacts or new medications.  She has started a protein shake before this all started.  Reports increased reflux over the past several weeks.  She tried famotidine for several days with no help.  She does drink 2 to 3 cups of caffeine per day.  No NSAID use.  Occasional spicy food use.  Rare alcohol use.  No vomiting.  Rare marijuana use.    BMs are brown and formed 1-2 times per day.  Denies any melena or hematochezia.  No weight loss.  Does get some lower abdominal cramping along with her upper abdominal cramping that started approximately 3 weeks ago.  Cannot pinpoint triggers but occasionally worse after eating.  No fever or chills.      REVIEW OF SYSTEMS:    CONSTITUTIONAL: Denies any fever, chills, rigors, and weight loss.  HEENT: No earache or tinnitus. Denies hearing loss or visual disturbances.  CARDIOVASCULAR: No chest pain or palpitations.   RESPIRATORY: Denies any cough, hemoptysis, shortness of breath or dyspnea on exertion.  GASTROINTESTINAL: As noted in the History of Present Illness.   GENITOURINARY: No problems with urination. Denies any hematuria or dysuria.  NEUROLOGIC: No dizziness or vertigo, denies headaches.   MUSCULOSKELETAL: Denies any muscle or joint pain.   SKIN: Denies skin rashes or itching.   ENDOCRINE: Denies excessive thirst. Denies intolerance to  "heat or cold.  PSYCHOSOCIAL: Denies depression or anxiety. Denies any recent memory loss.       Historical Information   Past Medical History:   Diagnosis Date   • Varicella     disease as a child     Past Surgical History:   Procedure Laterality Date   •  SECTION     • COSMETIC SURGERY      Pt states \" [place] Butt Lift\"   • LIPOSUCTION     • KS  DELIVERY ONLY N/A 2023    Procedure:  SECTION () REPEAT;  Surgeon: Renan Garcia MD;  Location: [facility] ;  Service: Obstetrics   • KS INSERTION INTRAUTERINE DEVICE IUD N/A 2023    Procedure: INSERTION OF INTRAUTERINE DEVICE (IUD);  Surgeon: Renan Garcia MD;  Location: [facility] ;  Service: Obstetrics     Social History   Social History     Substance and Sexual Activity   Alcohol Use Never     Social History     Substance and Sexual Activity   Drug Use Yes   • Types: Marijuana    Comment: Sometimes     Social History     Tobacco Use   Smoking Status Former   • Current packs/day: 0.00   • Types: Cigarettes   • Quit date:    • Years since quittin.3   Smokeless Tobacco Never   Tobacco Comments    Former smoker - As per [place]      Family History   Problem Relation Age of Onset   • Ovarian cysts Mother    • No Known Problems Father    • No Known Problems Sister    • No Known Problems Sister    • No Known Problems Brother    • No Known Problems Brother    • No Known Problems Brother    • No Known Problems Brother    • No Known Problems Son    • No Known Problems Son    • Breast cancer Neg Hx    • Colon cancer Neg Hx    • Ovarian cancer Neg Hx        Meds/Allergies       Current Outpatient Medications:   •  calcium carbonate (Tums) 500 mg chewable tablet  •  famotidine (PEPCID) 20 mg tablet  •  ibuprofen (MOTRIN) 600 mg tablet  •  omeprazole (PriLOSEC) 20 mg delayed release capsule  •  ondansetron (Zofran) 4 mg tablet  •  polyethylene glycol (Golytely) 4000 mL solution  •  acetaminophen (TYLENOL) 500 mg tablet  •  docusate " "sodium (COLACE) 100 mg capsule  •  ferrous sulfate (MISHA-IN-SOL) 75 (15 Fe) mg/mL drops  •  folic acid (FOLVITE) 1 mg tablet  •  miSOPROStol (Cytotec) 200 mcg tablet  •  norgestimate-ethinyl estradiol (Sprintec 28) 0.25-35 MG-MCG per tablet  •  ondansetron (ZOFRAN) 4 mg tablet  •  Prenatal Vit-Fe Fumarate-FA (PRENATAL VITAMINS PO)    No Known Allergies        Objective     Blood pressure 103/69, pulse 75, temperature 98.4 °F (36.9 °C), temperature source Tympanic, height 5' 6\" (1.676 m), weight 67.9 kg (149 lb 9.6 oz), last menstrual period 04/30/2024, SpO2 98%, not currently breastfeeding. Body mass index is 24.15 kg/m².        PHYSICAL EXAM:      General Appearance:   Alert, cooperative, no distress   HEENT:   Normocephalic, atraumatic, anicteric.     Neck:  Supple, symmetrical, trachea midline   Lungs:   Clear to auscultation bilaterally; no rales, rhonchi or wheezing; respirations unlabored    Heart::   Regular rate and rhythm; no murmur, rub, or gallop.   Abdomen:   Soft, non-tender, non-distended; normal bowel sounds; no masses, no organomegaly    Genitalia:   Deferred    Rectal:   Deferred    Extremities:  No cyanosis, clubbing or edema    Pulses:  2+ and symmetric    Skin:  No jaundice, rashes, or lesions    Lymph nodes:  No palpable cervical lymphadenopathy        Lab Results:   No visits with results within 1 Day(s) from this visit.   Latest known visit with results is:   Admission on 05/07/2024, Discharged on 05/07/2024   Component Date Value   • Ventricular Rate 05/07/2024 69    • Atrial Rate 05/07/2024 69    • OH Interval 05/07/2024 154    • QRSD Interval 05/07/2024 70    • QT Interval 05/07/2024 400    • QTC Interval 05/07/2024 428    • P Axis 05/07/2024 59    • QRS Axis 05/07/2024 72    • T Wave Axis 05/07/2024 57    • WBC 05/07/2024 7.42    • RBC 05/07/2024 4.45    • Hemoglobin 05/07/2024 12.7    • Hematocrit 05/07/2024 39.0    • MCV 05/07/2024 88    • MCH 05/07/2024 28.5    • MCHC 05/07/2024 32.6  "   • RDW 05/07/2024 13.6    • MPV 05/07/2024 10.2    • Platelets 05/07/2024 305    • nRBC 05/07/2024 0    • Segmented % 05/07/2024 59    • Immature Grans % 05/07/2024 1    • Lymphocytes % 05/07/2024 29    • Monocytes % 05/07/2024 6    • Eosinophils Relative 05/07/2024 4    • Basophils Relative 05/07/2024 1    • Absolute Neutrophils 05/07/2024 4.43    • Absolute Immature Grans 05/07/2024 0.04    • Absolute Lymphocytes 05/07/2024 2.13    • Absolute Monocytes 05/07/2024 0.47    • Eosinophils Absolute 05/07/2024 0.26    • Basophils Absolute 05/07/2024 0.09    • Sodium 05/07/2024 137    • Potassium 05/07/2024 3.8    • Chloride 05/07/2024 106    • CO2 05/07/2024 28    • ANION GAP 05/07/2024 3 (L)    • BUN 05/07/2024 16    • Creatinine 05/07/2024 0.83    • Glucose 05/07/2024 103    • Calcium 05/07/2024 8.9    • AST 05/07/2024 13    • ALT 05/07/2024 10    • Alkaline Phosphatase 05/07/2024 63    • Total Protein 05/07/2024 6.9    • Albumin 05/07/2024 4.2    • Total Bilirubin 05/07/2024 0.72    • eGFR 05/07/2024 92    • Lipase 05/07/2024 21    • Extra Tube 05/07/2024 Hold for add-ons.    • EXT Preg Test, Ur 05/07/2024 Negative    • Control 05/07/2024 Valid    • Color, UA 05/07/2024 Light Yellow    • Clarity, UA 05/07/2024 Clear    • Specific Gravity, UA 05/07/2024 1.021    • pH, UA 05/07/2024 5.5    • Leukocytes, UA 05/07/2024 Negative    • Nitrite, UA 05/07/2024 Negative    • Protein, UA 05/07/2024 Negative    • Glucose, UA 05/07/2024 Negative    • Ketones, UA 05/07/2024 Negative    • Urobilinogen, UA 05/07/2024 <2.0    • Bilirubin, UA 05/07/2024 Negative    • Occult Blood, UA 05/07/2024 Negative        Lab Results   Component Value Date    WBC 7.42 05/07/2024    HGB 12.7 05/07/2024    HCT 39.0 05/07/2024    MCV 88 05/07/2024     05/07/2024       Lab Results   Component Value Date    SODIUM 137 05/07/2024    K 3.8 05/07/2024     05/07/2024    CO2 28 05/07/2024    AGAP 3 (L) 05/07/2024    BUN 16 05/07/2024     "CREATININE 0.83 05/07/2024    GLUC 103 05/07/2024    GLUF 95 05/30/2023    CALCIUM 8.9 05/07/2024    AST 13 05/07/2024    ALT 10 05/07/2024    ALKPHOS 63 05/07/2024    TP 6.9 05/07/2024    TBILI 0.72 05/07/2024    EGFR 92 05/07/2024       No results found for: \"CRP\"    Lab Results   Component Value Date    ILX2VIJHIMWT 1.250 05/30/2023    TSH 1.85 02/12/2024       Lab Results   Component Value Date    FERRITIN 6 (L) 10/12/2022       Radiology Results:   CT abdomen pelvis with contrast    Result Date: 5/7/2024  Narrative: CT ABDOMEN AND PELVIS WITH IV CONTRAST INDICATION: LLQ ab pain, coming and going for the past 2 weeks.  Right upper quadrant abdominal pain since yesterday.  Associated nausea.  Rule out cyst, torsion, cholecystitis, kidney stones, intra-abdominal pathology. COMPARISON: Pelvic ultrasound 2/17/2023. TECHNIQUE: CT examination of the abdomen and pelvis was performed. Multiplanar 2D reformatted images were created from the source data. This examination, like all CT scans performed in the [facility], was performed utilizing techniques to minimize radiation dose exposure, including the use of iterative reconstruction and automated exposure control. Radiation dose length product (DLP) for this visit: 455 mGy-cm IV Contrast: 80 mL of iohexol (OMNIPAQUE) Enteric Contrast: Not administered. FINDINGS: ABDOMEN LOWER CHEST: No clinically significant abnormality in the visualized lower chest. LIVER/BILIARY TREE: Subcentimeter hypoattenuating lesion(s), too small to characterize but statistically likely benign, which do not require follow-up (ACR White Paper 2017). No suspicious mass. Normal hepatic contours. No biliary dilation. GALLBLADDER: No calcified gallstones. No pericholecystic inflammatory change. SPLEEN: Unremarkable. PANCREAS: Unremarkable. ADRENAL GLANDS: Unremarkable. KIDNEYS/URETERS: Unremarkable. No hydronephrosis. STOMACH AND BOWEL: Mildly distended small bowel loops in the left " hemiabdomen with scattered air-fluid levels. No abrupt transition point. Stomach is unremarkable. Mild fecal retention in the colon. APPENDIX: No findings to suggest appendicitis. ABDOMINOPELVIC CAVITY: No ascites. No pneumoperitoneum. No lymphadenopathy. VESSELS: Unremarkable for patient's age. PELVIS REPRODUCTIVE ORGANS: Nonspecific uterine heterogeneity with slightly lobular contour. Otherwise unremarkable. URINARY BLADDER: Unremarkable. ABDOMINAL WALL/INGUINAL REGIONS: Unremarkable. BONES: No acute fracture or suspicious osseous lesion.     Impression: 1. Mildly distended small bowel loops in the left hemiabdomen with scattered air-fluid levels.  No abrupt transition point. Appearance is nonspecific but could be related to enteritis. Otherwise no acute findings in the abdomen/pelvis. Workstation performed: [id number]

## 2024-05-13 NOTE — TELEPHONE ENCOUNTER
Procedure: Coloscopy/EGD  Date: 05/21/2024  Physician performing: Dr. Masters  Location of procedure:  [place]  Instructions given to patient: Golytely  Diabetic: N/A  Clearances: N/A

## 2024-06-09 ENCOUNTER — ANESTHESIA (OUTPATIENT)
Dept: ANESTHESIOLOGY | Facility: HOSPITAL | Age: 34
End: 2024-06-09

## 2024-06-09 ENCOUNTER — ANESTHESIA EVENT (OUTPATIENT)
Dept: ANESTHESIOLOGY | Facility: HOSPITAL | Age: 34
End: 2024-06-09

## 2024-07-26 DIAGNOSIS — Z30.011 ENCOUNTER FOR INITIAL PRESCRIPTION OF CONTRACEPTIVE PILLS: ICD-10-CM

## 2024-07-26 RX ORDER — NORGESTIMATE AND ETHINYL ESTRADIOL 0.25-0.035
1 KIT ORAL DAILY
Qty: 84 TABLET | Refills: 1 | Status: SHIPPED | OUTPATIENT
Start: 2024-07-26

## 2024-08-07 ENCOUNTER — NURSE TRIAGE (OUTPATIENT)
Age: 34
End: 2024-08-07

## 2024-08-07 NOTE — TELEPHONE ENCOUNTER
"Pt called in with vaginal irritation, burning, itching that started after using a new lubricant with intercourse a few days ago. Denies discharge, odor, abdominal pain. LMP 7/22/24. Denies known STD exposure but would like to be tested. Pt is moving tomorrow but would be available for appointment this week still. Call to clerical and spoke with Lili, pt scheduled for 8/9/24 and is thankful. Pt aware to call back with any worsening symptoms.       Reason for Disposition   Patient is worried they have a sexually transmitted infection (STI)    Answer Assessment - Initial Assessment Questions  1. SYMPTOM: \"What's the main symptom you're concerned about?\" (e.g., pain, itching, dryness)      Vaginal irritation, burning, itching  2. LOCATION: \"Where is the  symptoms located?\" (e.g., inside/outside, left/right)      vagina  3. ONSET: \"When did the  symptoms  start?\"      A few days ago  4. PAIN: \"Is there any pain?\" If Yes, ask: \"How bad is it?\" (Scale: 1-10; mild, moderate, severe)      irritation  5. ITCHING: \"Is there any itching?\" If Yes, ask: \"How bad is it?\" (Scale: 1-10; mild, moderate, severe)      mild  6. CAUSE: \"What do you think is causing the discharge?\" \"Have you had the same problem before? What happened then?\"      Denies discharge  7. OTHER SYMPTOMS: \"Do you have any other symptoms?\" (e.g., fever, itching, vaginal bleeding, pain with urination, injury to genital area, vaginal foreign body)      denies  8. PREGNANCY: \"Is there any chance you are pregnant?\" \"When was your last menstrual period?\"      LMP 7/22/24    Protocols used: Vaginal Symptoms-ADULT-[place]    "

## 2024-08-07 NOTE — TELEPHONE ENCOUNTER
Regarding: std testing  ----- Message from Gwen SPARKS sent at 8/7/2024  8:39 AM EDT -----  Attempted warm transfer.      I have no appointment to give for std testing until 8/14. I asked if she was exposed or just wanted to do routine. She really didn't want to tell me it seemed.      Patient moves tomorrow. She may go to clinic.

## 2024-08-07 NOTE — TELEPHONE ENCOUNTER
Pt called back to return VM to nurse April. Sent April a message regarding pt calling. Tried to transfer to CTS none available.

## 2025-04-25 ENCOUNTER — ANNUAL EXAM (OUTPATIENT)
Dept: OBGYN CLINIC | Facility: CLINIC | Age: 35
End: 2025-04-25

## 2025-04-25 VITALS
BODY MASS INDEX: 24.59 KG/M2 | HEART RATE: 72 BPM | HEIGHT: 66 IN | SYSTOLIC BLOOD PRESSURE: 115 MMHG | WEIGHT: 153 LBS | DIASTOLIC BLOOD PRESSURE: 76 MMHG

## 2025-04-25 DIAGNOSIS — Z59.9 FINANCIAL DIFFICULTIES: ICD-10-CM

## 2025-04-25 DIAGNOSIS — Z23 ENCOUNTER FOR ADMINISTRATION OF VACCINE: ICD-10-CM

## 2025-04-25 DIAGNOSIS — Z11.3 ROUTINE SCREENING FOR STI (SEXUALLY TRANSMITTED INFECTION): ICD-10-CM

## 2025-04-25 DIAGNOSIS — Z01.419 WELL WOMAN EXAM: Primary | ICD-10-CM

## 2025-04-25 PROCEDURE — 90471 IMMUNIZATION ADMIN: CPT | Performed by: OBSTETRICS & GYNECOLOGY

## 2025-04-25 PROCEDURE — 99395 PREV VISIT EST AGE 18-39: CPT | Performed by: OBSTETRICS & GYNECOLOGY

## 2025-04-25 PROCEDURE — 87591 N.GONORRHOEAE DNA AMP PROB: CPT

## 2025-04-25 PROCEDURE — 90651 9VHPV VACCINE 2/3 DOSE IM: CPT | Performed by: OBSTETRICS & GYNECOLOGY

## 2025-04-25 PROCEDURE — 87491 CHLMYD TRACH DNA AMP PROBE: CPT

## 2025-04-25 SDOH — ECONOMIC STABILITY - INCOME SECURITY: PROBLEM RELATED TO HOUSING AND ECONOMIC CIRCUMSTANCES, UNSPECIFIED: Z59.9

## 2025-04-25 NOTE — PROGRESS NOTES
OB/GYN ANNUAL H&P  25    Subjective    Angelina Joaquin is a 35 y.o. female who presents for annual well woman exam.  Last Pap smear normal in 2022.  Periods are regular every 28-30 days, lasting 7 days. No intermenstrual bleeding, spotting, or discharge.  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    Current contraception: none, interested in IUD  Patient is sexually active with 1 partner.  Patient requests STI testing today: yes - ordered  History of abnormal Pap smear: no  Family history of breast, endometrial, ovarian cancer: no  Gardasil vaccine: 1/3, 2nd dose ordered today   Cigarette smoking: no  EtOH: none  Recreational drug use: THC smoke    Menstrual History:  OB History          5    Para   3    Term   3            AB   2    Living   3         SAB        IAB   2    Ectopic        Multiple   0    Live Births   3                Patient's last menstrual period was 2025 (approximate).       The following portions of the patient's history were reviewed and updated as appropriate: allergies, current medications, past family history, past medical history, past social history, past surgical history, and problem list.  Pregnancies were uncomplicated, all delivered at term, c-sections    PHQ-2/9 Depression Screening           Today, no complaints or concerns.     Past Medical History:   Diagnosis Date    Migraine Carolynn 15 2022    Varicella     disease as a child     Review of Systems  Review of Systems   Constitutional:  Negative for fatigue and fever.   HENT:  Negative for rhinorrhea and sore throat.    Respiratory:  Negative for shortness of breath.    Cardiovascular:  Negative for chest pain, palpitations and leg swelling.   Gastrointestinal:  Negative for abdominal pain, constipation, diarrhea, nausea and vomiting.   Genitourinary:  Negative for dysuria.   Musculoskeletal:  Negative for arthralgias, back pain and myalgias.   Skin:  Negative  "for rash.   Neurological:  Positive for headaches. Negative for dizziness, seizures and light-headedness.        +migraine   Psychiatric/Behavioral:  Negative for dysphoric mood. The patient is not nervous/anxious.         Objective    Ht 5' 6\" (1.676 m)   Wt 69.4 kg (153 lb)   LMP 04/09/2025 (Approximate)   BMI 24.69 kg/m²     Physical Exam  Constitutional:       General: She is not in acute distress.     Appearance: Normal appearance.   Genitourinary:      Right Labia: No rash, tenderness or lesions.     Left Labia: No tenderness, lesions or rash.     No labial fusion noted.        Right Adnexa: not tender, not full and no mass present.     Left Adnexa: not tender, not full and no mass present.     Uterus exam comments: Normal size on bimanual.   Breasts:     Breasts are symmetrical.      Right: Normal. No inverted nipple, mass or nipple discharge.      Left: Normal. No inverted nipple, mass or nipple discharge.   HENT:      Head: Normocephalic and atraumatic.      Nose: Nose normal.      Mouth/Throat:      Mouth: Mucous membranes are moist.   Eyes:      Pupils: Pupils are equal, round, and reactive to light.   Cardiovascular:      Rate and Rhythm: Normal rate.      Pulses: Normal pulses.   Pulmonary:      Effort: Pulmonary effort is normal. No respiratory distress.   Abdominal:      General: There is no distension.      Palpations: Abdomen is soft.      Tenderness: There is no abdominal tenderness.   Musculoskeletal:      Right lower leg: No edema.      Left lower leg: No edema.   Neurological:      General: No focal deficit present.      Mental Status: She is alert.   Skin:     General: Skin is warm.      Capillary Refill: Capillary refill takes less than 2 seconds.   Psychiatric:         Mood and Affect: Mood normal.         Behavior: Behavior normal.   Vitals and nursing note reviewed.        Assessment/Plan:  1.  Well Woman Exam: Routine well woman exam done today.  2.  Cervical Cancer Screening: Pap and " HPV:Pap with HPV was not done today.  Current ASCCP Guidelines reviewed. Due for next pap in 2027.  3.  STI Screening: The patient accepted STD testing.  GC testing performed. HIV, syphilis, HBV, HCV ordered. Safe sex practices have been discussed.  4. Contraceptive Counseling: The patient is sexually active. Contraception - desires contraception, considering IUD versus Nexplanon. Will make appointment to place Mirena.  5. Vaccines: s/p 1 dose of HPV vaccine, 2nd dose offered today.   6. Referred to PCP as she has not seen one in years for help with mood and headaches.       Tobacco and Toxic Substance Assessment and Intervention:     Tobacco use screening performed    Alcohol and drug use screening performed      Other interventions: Low risk. Advised to limit THC when possible and to monitor for other substances in drugs.     Therapeutic Lifestyle Change Visit:     One-on-one comprehensive counseling, coaching, and health behavior change visit completed      D/w Dr. Steve Medina MD   PGY-2  4/25/2025  1:00 PM

## 2025-04-26 LAB
C TRACH DNA SPEC QL NAA+PROBE: NEGATIVE
N GONORRHOEA DNA SPEC QL NAA+PROBE: NEGATIVE

## 2025-04-28 ENCOUNTER — RESULTS FOLLOW-UP (OUTPATIENT)
Age: 35
End: 2025-04-28

## 2025-04-28 NOTE — RESULT ENCOUNTER NOTE
Please call patient, gonorrhea and chlamydia negative.     Azalia Medina MD  4/28/2025, 12:59 PM  PGY-2 [facility]

## 2025-04-28 NOTE — TELEPHONE ENCOUNTER
----- Message from Azalia Medina MD sent at 4/28/2025 12:59 PM EDT -----  Please call patient, gonorrhea and chlamydia negative.     Azalia Medina MD  4/28/2025, 12:59 PM  PGY-2 [facility]

## 2025-05-05 ENCOUNTER — PATIENT OUTREACH (OUTPATIENT)
Dept: OBGYN CLINIC | Facility: CLINIC | Age: 35
End: 2025-05-05

## 2025-05-05 NOTE — PROGRESS NOTES
VIRGILIO SALINAS contacted pt to address needs. Pt is a 36 yo Female primary language is English.     Pt denies financial difficulty, food insecurity, transpiration concern, FAHEEM. Pt reports that she is interested in therapy services for depression. VIRGILIO SALINAS educated pt about resources that are accepted by her insurance. VIRGILIO SALINAS received pt email and emailed pt a list of providers for BH. VIRGILIO SALINAS will f/u with pt next week regarding potential intake scheduled.

## 2025-05-08 ENCOUNTER — TELEPHONE (OUTPATIENT)
Dept: OBGYN CLINIC | Facility: CLINIC | Age: 35
End: 2025-05-08

## 2025-05-08 NOTE — TELEPHONE ENCOUNTER
[facility] attempted multiple times to reach patient. I left message on pt's voicemail w/ specialty pharmacy number if pt is still interested in IUD.

## 2025-05-13 ENCOUNTER — PATIENT OUTREACH (OUTPATIENT)
Dept: OBGYN CLINIC | Facility: CLINIC | Age: 35
End: 2025-05-13

## 2025-05-13 NOTE — PROGRESS NOTES
VIRGILIO SALINAS attempted to contact pt to f/u. Pt did not answer. VIRGILIO SALINAS left a voicemail for a return call and will try again at another time.

## 2025-05-19 ENCOUNTER — PATIENT OUTREACH (OUTPATIENT)
Dept: OBGYN CLINIC | Facility: CLINIC | Age: 35
End: 2025-05-19

## 2025-05-19 NOTE — PROGRESS NOTES
VIRGILIO SALINAS attempted to contact pt by phone x2. Pt did not answer. VIRGILIO SALINAS left a voicemail for a return call regarding if pt was able to schedule intake appointment for therapy. Pt did not answer. VIRGILIO SALINAS left a voicemail for a return call and sent unable to reach letter to pt my chart. VIRGILIO SALINAS will otherwise close this referral at this time.

## 2025-05-19 NOTE — LETTER
[address] [facility] [id number] [phone]    Re: Therapy scheduling   5/19/2025       Dear Angelina,    We tried to reach you by phone on 5/19/25 and was unfortunately unable to reach you.  If you would like additional support please call the Care Coordinator at [phone]     Sincerely,         Amalia Burns

## 2025-05-20 ENCOUNTER — OFFICE VISIT (OUTPATIENT)
Dept: FAMILY MEDICINE CLINIC | Facility: CLINIC | Age: 35
End: 2025-05-20

## 2025-05-20 ENCOUNTER — APPOINTMENT (OUTPATIENT)
Dept: LAB | Facility: HOSPITAL | Age: 35
End: 2025-05-20
Payer: COMMERCIAL

## 2025-05-20 VITALS
HEIGHT: 65 IN | BODY MASS INDEX: 24.83 KG/M2 | OXYGEN SATURATION: 98 % | WEIGHT: 149 LBS | DIASTOLIC BLOOD PRESSURE: 79 MMHG | SYSTOLIC BLOOD PRESSURE: 117 MMHG | HEART RATE: 69 BPM | TEMPERATURE: 98 F

## 2025-05-20 DIAGNOSIS — Z13.6 ENCOUNTER FOR LIPID SCREENING FOR CARDIOVASCULAR DISEASE: ICD-10-CM

## 2025-05-20 DIAGNOSIS — F32.A DEPRESSION, UNSPECIFIED DEPRESSION TYPE: ICD-10-CM

## 2025-05-20 DIAGNOSIS — D50.9 IRON DEFICIENCY ANEMIA, UNSPECIFIED IRON DEFICIENCY ANEMIA TYPE: ICD-10-CM

## 2025-05-20 DIAGNOSIS — Z13.1 SCREENING FOR DIABETES MELLITUS: ICD-10-CM

## 2025-05-20 DIAGNOSIS — F41.9 ANXIETY: ICD-10-CM

## 2025-05-20 DIAGNOSIS — R53.83 OTHER FATIGUE: ICD-10-CM

## 2025-05-20 DIAGNOSIS — F41.9 ANXIETY: Primary | ICD-10-CM

## 2025-05-20 DIAGNOSIS — Z13.220 ENCOUNTER FOR LIPID SCREENING FOR CARDIOVASCULAR DISEASE: ICD-10-CM

## 2025-05-20 DIAGNOSIS — Z11.3 ROUTINE SCREENING FOR STI (SEXUALLY TRANSMITTED INFECTION): ICD-10-CM

## 2025-05-20 DIAGNOSIS — K21.9 GASTROESOPHAGEAL REFLUX DISEASE WITHOUT ESOPHAGITIS: ICD-10-CM

## 2025-05-20 LAB
25(OH)D3 SERPL-MCNC: 22.5 NG/ML (ref 30–100)
ALBUMIN SERPL BCG-MCNC: 5.1 G/DL (ref 3.5–5)
ALP SERPL-CCNC: 80 U/L (ref 34–104)
ALT SERPL W P-5'-P-CCNC: 11 U/L (ref 7–52)
ANION GAP SERPL CALCULATED.3IONS-SCNC: 9 MMOL/L (ref 4–13)
AST SERPL W P-5'-P-CCNC: 13 U/L (ref 13–39)
B-HCG SERPL-ACNC: <0.6 MIU/ML (ref 0–5)
BASOPHILS # BLD AUTO: 0.1 THOUSANDS/ÂΜL (ref 0–0.1)
BASOPHILS NFR BLD AUTO: 1 % (ref 0–1)
BILIRUB SERPL-MCNC: 0.81 MG/DL (ref 0.2–1)
BUN SERPL-MCNC: 9 MG/DL (ref 5–25)
CALCIUM SERPL-MCNC: 9.9 MG/DL (ref 8.4–10.2)
CHLORIDE SERPL-SCNC: 99 MMOL/L (ref 96–108)
CO2 SERPL-SCNC: 30 MMOL/L (ref 21–32)
CREAT SERPL-MCNC: 0.87 MG/DL (ref 0.6–1.3)
EOSINOPHIL # BLD AUTO: 0.2 THOUSAND/ÂΜL (ref 0–0.61)
EOSINOPHIL NFR BLD AUTO: 3 % (ref 0–6)
ERYTHROCYTE [DISTWIDTH] IN BLOOD BY AUTOMATED COUNT: 13.3 % (ref 11.6–15.1)
FERRITIN SERPL-MCNC: 13 NG/ML (ref 30–307)
GFR SERPL CREATININE-BSD FRML MDRD: 86 ML/MIN/1.73SQ M
GLUCOSE SERPL-MCNC: 75 MG/DL (ref 65–140)
HCT VFR BLD AUTO: 43 % (ref 34.8–46.1)
HGB BLD-MCNC: 14.1 G/DL (ref 11.5–15.4)
IMM GRANULOCYTES # BLD AUTO: 0.03 THOUSAND/UL (ref 0–0.2)
IMM GRANULOCYTES NFR BLD AUTO: 0 % (ref 0–2)
IRON SATN MFR SERPL: 31 % (ref 15–50)
IRON SERPL-MCNC: 97 UG/DL (ref 50–212)
LYMPHOCYTES # BLD AUTO: 2.89 THOUSANDS/ÂΜL (ref 0.6–4.47)
LYMPHOCYTES NFR BLD AUTO: 36 % (ref 14–44)
MCH RBC QN AUTO: 28.3 PG (ref 26.8–34.3)
MCHC RBC AUTO-ENTMCNC: 32.8 G/DL (ref 31.4–37.4)
MCV RBC AUTO: 86 FL (ref 82–98)
MONOCYTES # BLD AUTO: 0.56 THOUSAND/ÂΜL (ref 0.17–1.22)
MONOCYTES NFR BLD AUTO: 7 % (ref 4–12)
NEUTROPHILS # BLD AUTO: 4.18 THOUSANDS/ÂΜL (ref 1.85–7.62)
NEUTS SEG NFR BLD AUTO: 53 % (ref 43–75)
NRBC BLD AUTO-RTO: 0 /100 WBCS
PLATELET # BLD AUTO: 301 THOUSANDS/UL (ref 149–390)
PMV BLD AUTO: 10.3 FL (ref 8.9–12.7)
POTASSIUM SERPL-SCNC: 3.7 MMOL/L (ref 3.5–5.3)
PROT SERPL-MCNC: 7.9 G/DL (ref 6.4–8.4)
RBC # BLD AUTO: 4.99 MILLION/UL (ref 3.81–5.12)
SODIUM SERPL-SCNC: 138 MMOL/L (ref 135–147)
TIBC SERPL-MCNC: 317.8 UG/DL (ref 250–450)
TRANSFERRIN SERPL-MCNC: 227 MG/DL (ref 203–362)
TSH SERPL DL<=0.05 MIU/L-ACNC: 4.04 UIU/ML (ref 0.45–4.5)
UIBC SERPL-MCNC: 221 UG/DL (ref 155–355)
WBC # BLD AUTO: 7.96 THOUSAND/UL (ref 4.31–10.16)

## 2025-05-20 PROCEDURE — 83540 ASSAY OF IRON: CPT

## 2025-05-20 PROCEDURE — 82306 VITAMIN D 25 HYDROXY: CPT

## 2025-05-20 PROCEDURE — 87340 HEPATITIS B SURFACE AG IA: CPT

## 2025-05-20 PROCEDURE — 80053 COMPREHEN METABOLIC PANEL: CPT

## 2025-05-20 PROCEDURE — 36415 COLL VENOUS BLD VENIPUNCTURE: CPT

## 2025-05-20 PROCEDURE — 84702 CHORIONIC GONADOTROPIN TEST: CPT

## 2025-05-20 PROCEDURE — 86803 HEPATITIS C AB TEST: CPT

## 2025-05-20 PROCEDURE — 85025 COMPLETE CBC W/AUTO DIFF WBC: CPT

## 2025-05-20 PROCEDURE — 87389 HIV-1 AG W/HIV-1&-2 AB AG IA: CPT

## 2025-05-20 PROCEDURE — 83550 IRON BINDING TEST: CPT

## 2025-05-20 PROCEDURE — 84443 ASSAY THYROID STIM HORMONE: CPT

## 2025-05-20 PROCEDURE — 86780 TREPONEMA PALLIDUM: CPT

## 2025-05-20 PROCEDURE — 82728 ASSAY OF FERRITIN: CPT

## 2025-05-20 RX ORDER — HYDROXYZINE HYDROCHLORIDE 10 MG/1
10 TABLET, FILM COATED ORAL EVERY 6 HOURS PRN
Qty: 30 TABLET | Refills: 0 | Status: SHIPPED | OUTPATIENT
Start: 2025-05-20

## 2025-05-20 RX ORDER — PANTOPRAZOLE SODIUM 40 MG/1
40 TABLET, DELAYED RELEASE ORAL DAILY
Qty: 30 TABLET | Refills: 0 | Status: SHIPPED | OUTPATIENT
Start: 2025-05-20 | End: 2025-06-19

## 2025-05-20 NOTE — PROGRESS NOTES
Name: Angelina Joaquin      : 1990      MRN: [id number]  Encounter Provider: Selina Antoine DO  Encounter Date: 2025   [facility]    :  Assessment & Plan  Anxiety  Other fatigue  Depression, unspecified depression type  Anxiety/Depression not at goal, most triggers related to providing good support for 3yo son. Reports many panic attack symptoms and mood instability symptoms. Discussed SSRI medications. Patient is not interested in SSRI at this time. Is interested in as needed medication like atarax. Interested in counseling. No SI or HI.    Plan:  Reviewed [email or web address]  Start atarax (hydroxyzine) 10mg every 6 hours as needed for anxiety.   Get fasting blood work  Reviewed Crisis hotline: 800  Behavioral health referral   Provided info on SSRI  F/u in 4 weeks for med check        Orders:  •  hCG, quantitative; Future  •  CBC and differential; Future  •  Iron Panel (Includes Ferritin, Iron Sat%, Iron, and TIBC); Future  •  Vitamin D 25 hydroxy; Future  •  hydrOXYzine HCL (ATARAX) 10 mg tablet; Take 1 tablet (10 mg total) by mouth every 6 (six) hours as needed for anxiety  •  TSH, 3rd generation with Free T4 reflex; Future  •  Ambulatory referral to Psych Services; Future    Gastroesophageal reflux disease without esophagitis  Reports heartburn symptoms that may be triggered from anxiety/depression  Start Protonix 40mg daily   Orders:  •  pantoprazole (PROTONIX) 40 mg tablet; Take 1 tablet (40 mg total) by mouth daily    Iron deficiency anemia, unspecified iron deficiency anemia type  History of ANTIONE, f/u Iron panel and CBC, especially given anxiety/depression symptoms to r/o causes         Encounter for lipid screening for cardiovascular disease    Orders:  •  Lipid panel; Future    Screening for diabetes mellitus    Orders:  •  Comprehensive metabolic panel; Future        Preventive Screenings:  - Diabetes Screening: orders  "placed and risks/benefits discussed  - Cholesterol Screening: risks/benefits discussed and orders placed   - Hepatitis C screening: screening up-to-date   - HIV screening: screening up-to-date   - Cervical cancer screening: screening up-to-date   - Colon cancer screening: screening not indicated   - Lung cancer screening: screening not indicated            Emotional and Mental Well-being, Sleep, Connectedness Assessment and Intervention:    Sleep/stress assessment performed    Depression and anxiety screening performed and reviewed    Behavorial Health referral given      Other interventions: Reviewed deep breathing  Referred to behavioral health and [email or web address]    Tobacco and Toxic Substance Assessment and Intervention:     Tobacco use screening performed    Alcohol and drug use screening performed      History of Present Illness   {?Quick Links Encounters * My Last Note * Last Note in Specialty * Snapshot * Since Last Visit * History :27500}  36 yo female with pmhx including ANTIONE, history of BV, history of marijuana use presents to the office for establishment of care:    Concerns:  Anxiety/Depression:  Reports anxiety worse after having her 2 year old baby. Reports symptoms including feeling hot, heart racing, constant worrying, poor sleep, wondering minds, forgets what she was doing, difficulty staying on task. Eating ok, 3 meals per day. No sleepless night. Reports acting \"violently\", quick reaction. Will swing. No SI or HI. Smokes marijuana. Smoked 1 cigarette per day for 2 years. No vapes. No alcohols. No other medications or other recreational drugs.   Tried moving closer to family in [place], was living with brother but was kicked out and moved back to [place]. Living in the shelter with her 3 yo son.     She has (Surinder) 3 yo boy, and  20 year and 17 year old (both live with grandma and dad, copartent very well).  2 year old (father is inconsistent- not in the picture)    No ED or " "hospitalizations.  No previous therapy or medications.   Working from home-[employer]- free financial credit cards.   She is interested in pregnancy test.         Review of Systems   Constitutional:  Negative for chills, diaphoresis and fever.   Eyes:  Negative for visual disturbance.   Respiratory:  Negative for shortness of breath and wheezing.    Cardiovascular:  Positive for palpitations. Negative for chest pain and leg swelling.   Gastrointestinal:  Negative for abdominal pain, nausea and vomiting.   Neurological:  Negative for dizziness, light-headedness and headaches.   Psychiatric/Behavioral:  Positive for decreased concentration, dysphoric mood and sleep disturbance. Negative for confusion, hallucinations, self-injury and suicidal ideas. The patient is nervous/anxious.      Past Medical History   Past Medical History[1]  Past Surgical History[2]  Family History[3]   reports that she quit smoking about 6 years ago. Her smoking use included cigarettes. She has never used smokeless tobacco. She reports that she does not currently use alcohol. She reports current drug use. Drug: Marijuana.  Current Outpatient Medications   Medication Instructions   • Cholecalciferol (VITAMIN D3) 1,000 Units, Oral, Daily   • ferrous sulfate 325 mg, Oral, Daily with breakfast   • hydrOXYzine HCL (ATARAX) 10 mg, Oral, Every 6 hours PRN   • pantoprazole (PROTONIX) 40 mg, Oral, Daily   Allergies[4]   Medications Ordered Prior to Encounter[5]   Social History[6]    Objective {?Quick Links Trend Vitals * Enter New Vitals * Results Review * Timeline (Adult) * Labs * Imaging * Cardiology * Procedures * Lung Cancer Screening * Surgical eConsent :35887}  /79 (BP Location: Left arm, Patient Position: Sitting, Cuff Size: Standard)   Pulse 69   Temp 98 °F (36.7 °C) (Temporal)   Ht 5' 5.35\" (1.66 m)   Wt 67.6 kg (149 lb)   LMP 04/09/2025 (Approximate)   SpO2 98%   BMI 24.53 kg/m²     Physical Exam  Vitals and nursing note reviewed. " "  Constitutional:       General: She is not in acute distress.     Appearance: She is well-developed.   HENT:      Head: Normocephalic and atraumatic.     Eyes:      Conjunctiva/sclera: Conjunctivae normal.       Cardiovascular:      Rate and Rhythm: Normal rate and regular rhythm.      Heart sounds: No murmur heard.  Pulmonary:      Effort: Pulmonary effort is normal. No respiratory distress.      Breath sounds: Normal breath sounds. No stridor. No wheezing, rhonchi or rales.   Chest:      Chest wall: No tenderness.   Abdominal:      General: There is no distension.      Palpations: Abdomen is soft. There is no mass.      Tenderness: There is no abdominal tenderness. There is no guarding or rebound.      Hernia: No hernia is present.     Musculoskeletal:         General: No swelling.      Cervical back: Neck supple.      Right lower leg: No edema.      Left lower leg: No edema.     Skin:     General: Skin is warm and dry.      Capillary Refill: Capillary refill takes less than 2 seconds.     Neurological:      Mental Status: She is alert.     Psychiatric:         Mood and Affect: Mood is anxious. Affect is tearful.              [1]  Past Medical History:  Diagnosis Date   • Migraine Carolynn 15 2022   • Varicella     disease as a child   [2]  Past Surgical History:  Procedure Laterality Date   •  SECTION     • COSMETIC SURGERY      Pt states \" [place] Butt Lift\"   • LIPOSUCTION     • WI  DELIVERY ONLY N/A 2023    Procedure:  SECTION () REPEAT;  Surgeon: Renan Garcia MD;  Location: [facility] ;  Service: Obstetrics   • WI INSERTION INTRAUTERINE DEVICE IUD N/A 2023    Procedure: INSERTION OF INTRAUTERINE DEVICE (IUD);  Surgeon: Renan Garcia MD;  Location: [facility] ;  Service: Obstetrics   [3]  Family History  Problem Relation Name Age of Onset   • Ovarian cysts Mother     • No Known Problems Father     • No Known Problems Sister     • No Known Problems Sister     • No " Known Problems Brother     • No Known Problems Brother     • No Known Problems Brother     • No Known Problems Brother     • No Known Problems Son Bg    • No Known Problems Son Rahul    • Cancer Paternal Cousin     • Breast cancer Neg Hx     • Colon cancer Neg Hx     • Ovarian cancer Neg Hx     [4]  No Known Allergies[5]  No current outpatient medications on file prior to visit.     No current facility-administered medications on file prior to visit.   [6]  Social History  Tobacco Use   • Smoking status: Former     Current packs/day: 0.00     Types: Cigarettes     Quit date:      Years since quittin.4   • Smokeless tobacco: Never   • Tobacco comments:     Former smoker - As per [place]    Vaping Use   • Vaping status: Every Day   • Substances: THC   Substance and Sexual Activity   • Alcohol use: Not Currently   • Drug use: Yes     Types: Marijuana     Comment: Sometimes   • Sexual activity: Yes     Partners: Male     Birth control/protection: None

## 2025-05-20 NOTE — ASSESSMENT & PLAN NOTE
History of ANTIONE, f/u Iron panel and CBC, especially given anxiety/depression symptoms to r/o causes

## 2025-05-20 NOTE — PROGRESS NOTES
Name: Angelina Joaquin      : 1990      MRN: [id number]  Encounter Provider: Selina Antoine DO  Encounter Date: 2025   Encounter department: [facility]  :  Assessment & Plan  Encounter for lipid screening for cardiovascular disease         Screening for diabetes mellitus         Other fatigue         Iron deficiency anemia, unspecified iron deficiency anemia type  {For anemia, please use [facility] Hematology Work-Up SmartSet to perform initial work-up. If there are still concerns regarding management, an E-Consult to Hematology should be ordered. Click here to go the therapy plan activity if you need to order iron infusions. Search for 'PCP Venofer' therapy plan.  :83435}                  History of Present Illness {?Quick Links Encounters * My Last Note * Last Note in Specialty * Snapshot * Since Last Visit * History :39806}  HPI  Pt presenting today to establish care with a new PCP.      Acute Concerns:  ***      PMHx:  ***      Medications:  ***       Allergies:  ***      Hospitalizations:  ***      Surgeries:  ***      Psych Hx:  ***        Social Hx:  Caffeine use? ***  Smokes? - ***  What do you smoke? (Cigarettes, cigars, vape, etc.)***  How many years smoking - ***  How many ppd - ***  Pack years - ***    Drinks? ***  What type of alcohol?***  How many drinks per episode?***  How many drinks per week?***  How many years?***    Illicit drug use?***  Which drug(s)?***  How many years?***    Occupation - ***    Living situation (type of dwelling and with whom) - ***    Drives? Or has access to a car? ***        Sexual Hx:  Sexually active? - ***  Partners - men, women, or both?***  STD Hx***  Contraceptive use***        Menstrual Hx:  Menarche***  LMP***  Menstrual cycle length (28-32 days on average)***  Bleeding days***  G***P***        Family Hx:  Cancer? ***  Stroke***  MI***  HTN ***  HLD ***  Diabetes ***      Screenings:  HIV?***  Hep C?***  Pap  "smear?***  Mammo?***  Colonoscopy?***  Lung CT Screen?***  DEXA scan?***        Review of Systems    Objective {?Quick Links Trend Vitals * Enter New Vitals * Results Review * Timeline (Adult) * Labs * Imaging * Cardiology * Procedures * Lung Cancer Screening * Surgical eConsent :29800}  /79 (BP Location: Left arm, Patient Position: Sitting, Cuff Size: Standard)   Pulse 69   Temp 98 °F (36.7 °C) (Temporal)   Ht 5' 5.35\" (1.66 m)   Wt 67.6 kg (149 lb)   LMP 04/09/2025 (Approximate)   SpO2 98%   BMI 24.53 kg/m²      Physical Exam  {Administrative / Billing Section (Optional):65222}  "

## 2025-05-20 NOTE — PATIENT INSTRUCTIONS
[email or web address]  Take atarax (hydroxyzine) 10mg every 6 hours as needed for anxiety.   Get fasting blood work  Call [phone]       Escitalopram (es sye CAROLYN oh pram)   Brand Names: US Lexapro   Brand Names: Janeth ACH-Escitalopram; ACT Escitalopram ODT; AG-Escitalopram; APO-Escitalopram; Auro-Escitalopram; BIO-Escitalopram; Cipralex; JAMP-Escitalopram; CARY-Escitalopram; M-Escitalopram; Mar-Escitalopram; MINT-Escitalopram; MYLAN-Escitalopram; JOLEEN-Escitalopram; NRA-Escitalopram; PMS-Escitalopram; PMSC-Escitalopram; ROD-Escitalopram; SANDOZ Escitalopram; TARO-Escitalopram; TEVA-Escitalopram   Warning   For all patients taking this drug:   Drugs like this one have raised the chance of suicidal thoughts or actions in children and young adults. The risk may be greater in people who have had these thoughts or actions in the past. All people who take this drug need to be watched closely. Call the doctor right away if signs like depression, nervousness, restlessness, grouchiness, panic attacks, or changes in mood or actions are new or worse. Call the doctor right away if any thoughts or actions of suicide occur.  Children:   This drug is not approved for use in all children. Talk with the doctor to be sure that this drug is right for your child.  What is this drug used for?   It is used to treat depression.  It is used to treat anxiety.  It may be given to you for other reasons. Talk with the doctor.  What do I need to tell my doctor BEFORE I take this drug?   If you are allergic to this drug; any part of this drug; or any other drugs, foods, or substances. Tell your doctor about the allergy and what signs you had.  If you are taking any of these drugs: Linezolid or methylene blue.  If you are taking any of these drugs: Citalopram or pimozide.  If you have taken certain drugs for depression or Parkinson's disease in the last 14 days. This includes isocarboxazid, phenelzine, tranylcypromine, selegiline, or  rasagiline. Very high blood pressure may happen.  This is not a list of all drugs or health problems that interact with this drug.  Tell your doctor and pharmacist about all of your drugs (prescription or OTC, natural products, vitamins) and health problems. You must check to make sure that it is safe for you to take this drug with all of your drugs and health problems. Do not start, stop, or change the dose of any drug without checking with your doctor.  What are some things I need to know or do while I take this drug?   Tell all of your health care providers that you take this drug. This includes your doctors, nurses, pharmacists, and dentists.  Avoid driving and doing other tasks or actions that call for you to be alert until you see how this drug affects you.  Do not stop taking this drug all of a sudden without calling your doctor. You may have a greater risk of side effects. If you need to stop this drug, you will want to slowly stop it as ordered by your doctor.  Avoid drinking alcohol while taking this drug.  Talk with your doctor before you use marijuana, other forms of cannabis, or prescription or OTC drugs that may slow your actions.  In depression, sleep and appetite may get better soon after starting this drug. Other depression signs may take up to 4 weeks to get better.  This drug may raise the chance of bleeding. Sometimes, bleeding can be life-threatening. Talk with the doctor.  This drug can cause low sodium levels. Very low sodium levels can be life-threatening, leading to seizures, passing out, trouble breathing, or death.  If you are 65 or older, use this drug with care. You could have more side effects.  This drug may affect growth in children and teens in some cases. They may need regular growth checks. Talk with the doctor.  Tell your doctor if you are pregnant, may be pregnant, or plan to get pregnant while taking this drug. You will need to talk about the benefits and risks to you and the  baby. Taking this drug in the third trimester of pregnancy may raise your risk of bleeding after delivery and may lead to some health problems in the .  Tell your doctor if you are breast-feeding. You will need to talk about any risks to your baby.  What are some side effects that I need to call my doctor about right away?   WARNING/CAUTION: Even though it may be rare, some people may have very bad and sometimes deadly side effects when taking a drug. Tell your doctor or get medical help right away if you have any of the following signs or symptoms that may be related to a very bad side effect:  Signs of an allergic reaction, like rash; hives; itching; red, swollen, blistered, or peeling skin with or without fever; wheezing; tightness in the chest or throat; trouble breathing, swallowing, or talking; unusual hoarseness; or swelling of the mouth, face, lips, tongue, or throat.  Signs of low sodium levels like headache, trouble focusing, memory problems, feeling confused, weakness, seizures, or change in balance.  Signs of bleeding like throwing up or coughing up blood; vomit that looks like coffee grounds; blood in the urine; black, red, or tarry stools; bleeding from the gums; abnormal vaginal bleeding; bruises without a cause or that get bigger; or bleeding you cannot stop.  Seizures.  Fever or chills.  Painful erection (hard penis) or an erection that lasts for longer than 4 hours.  Sex problems have happened with drugs like this one. This includes lowered interest in sex, trouble having an orgasm, ejaculation problems, or trouble getting or keeping an erection. If you have any questions, talk with your doctor.  Some people may have a higher chance of eye problems with this drug. Your doctor may want you to have an eye exam to see if you have a higher chance of these eye problems. Call your doctor right away if you have eye pain, change in eyesight, or swelling or redness in or around the eye.  A severe and  sometimes deadly problem called serotonin syndrome may happen. The risk may be greater if you also take certain other drugs. Call your doctor right away if you have agitation; change in balance; confusion; hallucinations; fever; fast or abnormal heartbeat; flushing; muscle twitching or stiffness; seizures; shivering or shaking; sweating a lot; severe diarrhea, upset stomach, or throwing up; or very bad headache.  What are some other side effects of this drug?   All drugs may cause side effects. However, many people have no side effects or only have minor side effects. Call your doctor or get medical help if any of these side effects or any other side effects bother you or do not go away:  Feeling dizzy, sleepy, tired, or weak.  Upset stomach.  Diarrhea or constipation.  Dry mouth.  Trouble sleeping.  Sweating a lot.  Flu-like signs.  Runny nose.  Headache.  Yawning.  These are not all of the side effects that may occur. If you have questions about side effects, call your doctor. Call your doctor for medical advice about side effects.  You may report side effects to your national health agency.  You may report side effects to the FDA at 1-189.751.9736. You may also report side effects at https://www.fda.gov/medwatch.  How is this drug best taken?   Use this drug as ordered by your doctor. Read all information given to you. Follow all instructions closely.  All products:   Take with or without food.  Keep taking this drug as you have been told by your doctor or other health care provider, even if you feel well.  Oral solution:   Measure liquid doses carefully. Use the measuring device that comes with this drug. If there is none, ask the pharmacist for a device to measure this drug.  What do I do if I miss a dose?   Take a missed dose as soon as you think about it.  If it is close to the time for your next dose, skip the missed dose and go back to your normal time.  Do not take 2 doses at the same time or extra  doses.  How do I store and/or throw out this drug?   Store at room temperature in a dry place. Do not store in a bathroom.  Keep all drugs in a safe place. Keep all drugs out of the reach of children and pets.  Throw away unused or  drugs. Do not flush down a toilet or pour down a drain unless you are told to do so. Check with your pharmacist if you have questions about the best way to throw out drugs. There may be drug take-back programs in your area.  General drug facts   If your symptoms or health problems do not get better or if they become worse, call your doctor.  Do not share your drugs with others and do not take anyone else's drugs.  Some drugs may have another patient information leaflet. If you have any questions about this drug, please talk with your doctor, nurse, pharmacist, or other health care provider.  This drug comes with an extra patient fact sheet called a Medication Guide. Read it with care. Read it again each time this drug is refilled. If you have any questions about this drug, please talk with the doctor, pharmacist, or other health care provider.  If you think there has been an overdose, call your poison control center or get medical care right away. Be ready to tell or show what was taken, how much, and when it happened.  Consumer Information Use and Disclaimer   This generalized information is a limited summary of diagnosis, treatment, and/or medication information. It is not meant to be comprehensive and should be used as a tool to help the user understand and/or assess potential diagnostic and treatment options. It does NOT include all information about conditions, treatments, medications, side effects, or risks that may apply to a specific patient. It is not intended to be medical advice or a substitute for the medical advice, diagnosis, or treatment of a health care provider based on the health care provider's examination and assessment of a patient's specific and unique  circumstances. Patients must speak with a health care provider for complete information about their health, medical questions, and treatment options, including any risks or benefits regarding use of medications. This information does not endorse any treatments or medications as safe, effective, or approved for treating a specific patient. Up[employer] Inc[employer] and its affiliates disclaim any warranty or liability relating to this information or the use thereof. The use of this information is governed by the Terms of Use, available at https://www.[employer].com/en/know/clinical-effectiveness-terms.  Last Reviewed Date   2023-09-05  Copyright   © 2024 Up[employer]Date, Inc. and its affiliates and/or licensors. All rights reserved.    Patient Education     Hydroxyzine (roldan DROKS i zeen)   Brand Names: US Vistaril   Brand Names: Janeth Atarax; SOLOMON-Hydroxyzin; PMS-HydrOXYzine HCl [DSC]   What is this drug used for?   It is used to treat itching.  It is used to treat anxiety.  It is used to put you to sleep for surgery.  It is used to treat mood problems.  It is used to treat upset stomach and throwing up.  It may be given to you for other reasons. Talk with the doctor.  What do I need to tell my doctor BEFORE I take this drug?   If you are allergic to this drug; any part of this drug; or any other drugs, foods, or substances. Tell your doctor about the allergy and what signs you had.  If you have ever had a long QT on ECG.  If you are in early pregnancy. Do not take this drug during early pregnancy.  If you are breast-feeding. Do not breast-feed while you take this drug.  This is not a list of all drugs or health problems that interact with this drug.  Tell your doctor and pharmacist about all of your drugs (prescription or OTC, natural products, vitamins) and health problems. You must check to make sure that it is safe for you to take this drug with all of your drugs and health problems. Do not start, stop, or change the dose of  any drug without checking with your doctor.  What are some things I need to know or do while I take this drug?   All products:   Tell all of your health care providers that you take this drug. This includes your doctors, nurses, pharmacists, and dentists.  Avoid driving and doing other tasks or actions that call for you to be alert until you see how this drug affects you.  Talk with your doctor before you use alcohol, marijuana or other forms of cannabis, or prescription or OTC drugs that may slow your actions.  An unsafe heartbeat that is not normal (long QT on ECG) has happened with this drug. This may raise the chance of sudden death. Talk with the doctor.  If you are 65 or older, use this drug with care. You could have more side effects.  Tell your doctor if you are pregnant or plan on getting pregnant. You will need to talk about the benefits and risks of using this drug while you are pregnant.  Injection:   Unsafe heart problems and sometimes death have rarely happened when this drug was given with alcohol or certain other drugs that may slow your actions. Talk with your doctor.  What are some side effects that I need to call my doctor about right away?   WARNING/CAUTION: Even though it may be rare, some people may have very bad and sometimes deadly side effects when taking a drug. Tell your doctor or get medical help right away if you have any of the following signs or symptoms that may be related to a very bad side effect:  All products:   Signs of an allergic reaction, like rash; hives; itching; red, swollen, blistered, or peeling skin with or without fever; wheezing; tightness in the chest or throat; trouble breathing, swallowing, or talking; unusual hoarseness; or swelling of the mouth, face, lips, tongue, or throat.  Fast or abnormal heartbeat.  Very bad dizziness or passing out.  Trouble controlling body movements.  Feeling confused.  Rarely, a very bad skin reaction has happened with this drug. Signs  include fever and many small skin spots within large areas of redness and swelling. Call your doctor right away if you have a rash or any of these signs.  Injection:   Tissue damage has happened with this drug. Sometimes, this has led to surgery. Tell your nurse if you have any burning, color changes, pain, skin breakdown, or swelling where the shot was given.  What are some other side effects of this drug?   All drugs may cause side effects. However, many people have no side effects or only have minor side effects. Call your doctor or get medical help if any of these side effects or any other side effects bother you or do not go away:  Dry mouth.  Feeling sleepy.  These are not all of the side effects that may occur. If you have questions about side effects, call your doctor. Call your doctor for medical advice about side effects.  You may report side effects to your national health agency.  You may report side effects to the FDA at 1-315.831.8460. You may also report side effects at https://www.fda.gov/medwatch.  How is this drug best taken?   Use this drug as ordered by your doctor. Read all information given to you. Follow all instructions closely.  All oral products:   Take with or without food. Take with food if it causes an upset stomach.  Liquid:   Measure liquid doses carefully. Use the measuring device that comes with this drug. If there is none, ask the pharmacist for a device to measure this drug.  Injection:   It is given as a shot into a muscle.  What do I do if I miss a dose?   All oral products:   If you take this drug on a regular basis, take a missed dose as soon as you think about it.  If it is close to the time for your next dose, skip the missed dose and go back to your normal time.  Do not take 2 doses at the same time or extra doses.  Many times this drug is taken on an as needed basis. Do not take more often than told by the doctor.  Injection:   Call your doctor to find out what to do.  How  do I store and/or throw out this drug?   All oral products:   Store at room temperature protected from light. Store in a dry place. Do not store in a bathroom.  Injection:   If you need to store this drug at home, talk with your doctor, nurse, or pharmacist about how to store it.  All products:   Keep all drugs in a safe place. Keep all drugs out of the reach of children and pets.  Throw away unused or  drugs. Do not flush down a toilet or pour down a drain unless you are told to do so. Check with your pharmacist if you have questions about the best way to throw out drugs. There may be drug take-back programs in your area.  General drug facts   If your symptoms or health problems do not get better or if they become worse, call your doctor.  Do not share your drugs with others and do not take anyone else's drugs.  Some drugs may have another patient information leaflet. If you have any questions about this drug, please talk with your doctor, nurse, pharmacist, or other health care provider.  Some drugs may have another patient information leaflet. Check with your pharmacist. If you have any questions about this drug, please talk with your doctor, nurse, pharmacist, or other health care provider.  If you think there has been an overdose, call your poison control center or get medical care right away. Be ready to tell or show what was taken, how much, and when it happened.  Consumer Information Use and Disclaimer   This generalized information is a limited summary of diagnosis, treatment, and/or medication information. It is not meant to be comprehensive and should be used as a tool to help the user understand and/or assess potential diagnostic and treatment options. It does NOT include all information about conditions, treatments, medications, side effects, or risks that may apply to a specific patient. It is not intended to be medical advice or a substitute for the medical advice, diagnosis, or treatment of a  "health care provider based on the health care provider's examination and assessment of a patient's specific and unique circumstances. Patients must speak with a health care provider for complete information about their health, medical questions, and treatment options, including any risks or benefits regarding use of medications. This information does not endorse any treatments or medications as safe, effective, or approved for treating a specific patient. UpToDate, Inc. and its affiliates disclaim any warranty or liability relating to this information or the use thereof. The use of this information is governed by the Terms of Use, available at https://www.[employer].com/en/know/clinical-effectiveness-terms.  Last Reviewed Date   2023-05-31  Copyright   © 2024 UpToDate, Inc. and its affiliates and/or licensors. All rights reserved.              Patient Education     Routine physical for adults   The Basics   Written by the doctors and editors at [facility]   What is a physical? -- A physical is a routine visit, or \"check-up,\" with your doctor. You might also hear it called a \"wellness visit\" or \"preventive visit.\"  During each visit, the doctor will:   Ask about your physical and mental health   Ask about your habits, behaviors, and lifestyle   Do an exam   Give you vaccines if needed   Talk to you about any medicines you take   Give advice about your health   Answer your questions  Getting regular check-ups is an important part of taking care of your health. It can help your doctor find and treat any problems you have. But it's also important for preventing health problems.  A routine physical is different from a \"sick visit.\" A sick visit is when you see a doctor because of a health concern or problem. Since physicals are scheduled ahead of time, you can think about what you want to ask the doctor.  How often should I get a physical? -- It depends on your age and health. In general, for people age 21 years and " "older:   If you are younger than 50 years, you might be able to get a physical every 3 years.   If you are 50 years or older, your doctor might recommend a physical every year.  If you have an ongoing health condition, like diabetes or high blood pressure, your doctor will probably want to see you more often.  What happens during a physical? -- In general, each visit will include:   Physical exam - The doctor or nurse will check your height, weight, heart rate, and blood pressure. They will also look at your eyes and ears. They will ask about how you are feeling and whether you have any symptoms that bother you.   Medicines - It's a good idea to bring a list of all the medicines you take to each doctor visit. Your doctor will talk to you about your medicines and answer any questions. Tell them if you are having any side effects that bother you. You should also tell them if you are having trouble paying for any of your medicines.   Habits and behaviors - This includes:   Your diet   Your exercise habits   Whether you smoke, drink alcohol, or use drugs   Whether you are sexually active   Whether you feel safe at home  Your doctor will talk to you about things you can do to improve your health and lower your risk of health problems. They will also offer help and support. For example, if you want to quit smoking, they can give you advice and might prescribe medicines. If you want to improve your diet or get more physical activity, they can help you with this, too.   Lab tests, if needed - The tests you get will depend on your age and situation. For example, your doctor might want to check your:   Cholesterol   Blood sugar   Iron level   Vaccines - The recommended vaccines will depend on your age, health, and what vaccines you already had. Vaccines are very important because they can prevent certain serious or deadly infections.   Discussion of screening - \"Screening\" means checking for diseases or other health problems " before they cause symptoms. Your doctor can recommend screening based on your age, risk, and preferences. This might include tests to check for:   Cancer, such as breast, prostate, cervical, ovarian, colorectal, prostate, lung, or skin cancer   Sexually transmitted infections, such as chlamydia and gonorrhea   Mental health conditions like depression and anxiety  Your doctor will talk to you about the different types of screening tests. They can help you decide which screenings to have. They can also explain what the results might mean.   Answering questions - The physical is a good time to ask the doctor or nurse questions about your health. If needed, they can refer you to other doctors or specialists, too.  Adults older than 65 years often need other care, too. As you get older, your doctor will talk to you about:   How to prevent falling at home   Hearing or vision tests   Memory testing   How to take your medicines safely   Making sure that you have the help and support you need at home  All topics are updated as new evidence becomes available and our peer review process is complete.  This topic retrieved from [employer] on: May 02, 2024.  Topic 365137 Version 1.0  Release: 32.4.3 - C32.122  © 2024 UpToDate, Inc. and/or its affiliates. All rights reserved.  Consumer Information Use and Disclaimer   Disclaimer: This generalized information is a limited summary of diagnosis, treatment, and/or medication information. It is not meant to be comprehensive and should be used as a tool to help the user understand and/or assess potential diagnostic and treatment options. It does NOT include all information about conditions, treatments, medications, side effects, or risks that may apply to a specific patient. It is not intended to be medical advice or a substitute for the medical advice, diagnosis, or treatment of a health care provider based on the health care provider's examination and assessment of a patient's specific  and unique circumstances. Patients must speak with a health care provider for complete information about their health, medical questions, and treatment options, including any risks or benefits regarding use of medications. This information does not endorse any treatments or medications as safe, effective, or approved for treating a specific patient. Up[employer]Date, Inc. and its affiliates disclaim any warranty or liability relating to this information or the use thereof.The use of this information is governed by the Terms of Use, available at https://www.woltersZiplocaluwer.com/en/know/clinical-effectiveness-terms. 2024© [employer] and its affiliates and/or licensors. All rights [email or web address]  Copyright   © 2024 [employer] and/or its affiliates. All rights reserved.

## 2025-05-21 LAB
HBV SURFACE AG SER QL: NORMAL
HCV AB SER QL: NORMAL
HIV 1+2 AB+HIV1 P24 AG SERPL QL IA: NORMAL
TREPONEMA PALLIDUM IGG+IGM AB [PRESENCE] IN SERUM OR PLASMA BY IMMUNOASSAY: NORMAL

## 2025-05-28 ENCOUNTER — RESULTS FOLLOW-UP (OUTPATIENT)
Dept: FAMILY MEDICINE CLINIC | Facility: CLINIC | Age: 35
End: 2025-05-28

## 2025-05-28 DIAGNOSIS — E61.1 IRON DEFICIENCY: Primary | ICD-10-CM

## 2025-05-28 DIAGNOSIS — E55.9 VITAMIN D DEFICIENCY: ICD-10-CM

## 2025-05-28 RX ORDER — FERROUS SULFATE 325(65) MG
325 TABLET ORAL
Qty: 90 TABLET | Refills: 1 | Status: SHIPPED | OUTPATIENT
Start: 2025-05-28

## 2025-06-19 PROBLEM — F41.9 ANXIETY: Status: ACTIVE | Noted: 2025-06-19

## 2025-06-19 PROBLEM — F32.1 CURRENT MODERATE EPISODE OF MAJOR DEPRESSIVE DISORDER WITHOUT PRIOR EPISODE (HCC): Status: ACTIVE | Noted: 2025-06-19

## (undated) DEVICE — SUT MONOCRYL 0 CTX 36 IN Y398H

## (undated) DEVICE — CHLORAPREP HI-LITE 26ML ORANGE

## (undated) DEVICE — GLOVE SRG BIOGEL ECLIPSE 8

## (undated) DEVICE — MEDI-VAC YANKAUER SUCTION HANDLE W/STRAIGHT TIP & CONTROL VENT: Brand: CARDINAL HEALTH

## (undated) DEVICE — SUT VICRYL 0 CT-1 27 IN J260H

## (undated) DEVICE — SUT VICRYL 4-0 KS 27 IN J662H

## (undated) DEVICE — Device

## (undated) DEVICE — PACK C-SECTION PBDS

## (undated) DEVICE — AVITENE MICROFIBRILLAR COLLAGEN HEMOSTAT FLOUR: Brand: AVITENE FLOUR

## (undated) DEVICE — 3M™ STERI-STRIP™ REINFORCED ADHESIVE SKIN CLOSURES, R1547, 1/2 IN X 4 IN (12 MM X 100 MM), 6 STRIPS/ENVELOPE: Brand: 3M™ STERI-STRIP™

## (undated) DEVICE — SKIN MARKER DUAL TIP WITH RULER CAP, FLEXIBLE RULER AND LABELS: Brand: DEVON

## (undated) DEVICE — GAUZE SPONGES,16 PLY: Brand: CURITY

## (undated) DEVICE — ABDOMINAL PAD: Brand: DERMACEA

## (undated) DEVICE — TELFA NON-ADHERENT ABSORBENT DRESSING: Brand: TELFA

## (undated) DEVICE — SUT PLAIN 2-0 CTX 27 IN 872H